# Patient Record
Sex: MALE | Race: WHITE | NOT HISPANIC OR LATINO | Employment: OTHER | URBAN - METROPOLITAN AREA
[De-identification: names, ages, dates, MRNs, and addresses within clinical notes are randomized per-mention and may not be internally consistent; named-entity substitution may affect disease eponyms.]

---

## 2018-03-14 ENCOUNTER — OFFICE VISIT CONVERTED (OUTPATIENT)
Dept: CARDIOLOGY | Facility: CLINIC | Age: 63
End: 2018-03-14
Attending: INTERNAL MEDICINE

## 2018-03-14 ENCOUNTER — CONVERSION ENCOUNTER (OUTPATIENT)
Dept: CARDIOLOGY | Facility: CLINIC | Age: 63
End: 2018-03-14

## 2018-08-22 ENCOUNTER — CONVERSION ENCOUNTER (OUTPATIENT)
Dept: CARDIOLOGY | Facility: CLINIC | Age: 63
End: 2018-08-22

## 2018-08-22 ENCOUNTER — OFFICE VISIT CONVERTED (OUTPATIENT)
Dept: CARDIOLOGY | Facility: CLINIC | Age: 63
End: 2018-08-22
Attending: INTERNAL MEDICINE

## 2018-09-10 ENCOUNTER — OFFICE VISIT CONVERTED (OUTPATIENT)
Dept: GASTROENTEROLOGY | Facility: CLINIC | Age: 63
End: 2018-09-10
Attending: NURSE PRACTITIONER

## 2018-09-14 ENCOUNTER — CONVERSION ENCOUNTER (OUTPATIENT)
Dept: CARDIOLOGY | Facility: CLINIC | Age: 63
End: 2018-09-14
Attending: INTERNAL MEDICINE

## 2018-09-25 ENCOUNTER — OFFICE VISIT CONVERTED (OUTPATIENT)
Dept: FAMILY MEDICINE CLINIC | Age: 63
End: 2018-09-25
Attending: NURSE PRACTITIONER

## 2018-11-16 ENCOUNTER — OFFICE VISIT CONVERTED (OUTPATIENT)
Dept: FAMILY MEDICINE CLINIC | Age: 63
End: 2018-11-16
Attending: NURSE PRACTITIONER

## 2018-12-14 ENCOUNTER — CONVERSION ENCOUNTER (OUTPATIENT)
Dept: ORTHOPEDIC SURGERY | Facility: CLINIC | Age: 63
End: 2018-12-14

## 2018-12-14 ENCOUNTER — OFFICE VISIT CONVERTED (OUTPATIENT)
Dept: ORTHOPEDIC SURGERY | Facility: CLINIC | Age: 63
End: 2018-12-14
Attending: ORTHOPAEDIC SURGERY

## 2018-12-31 ENCOUNTER — OFFICE VISIT CONVERTED (OUTPATIENT)
Dept: ORTHOPEDIC SURGERY | Facility: CLINIC | Age: 63
End: 2018-12-31
Attending: ORTHOPAEDIC SURGERY

## 2019-01-08 ENCOUNTER — HOSPITAL ENCOUNTER (OUTPATIENT)
Dept: PHYSICAL THERAPY | Facility: CLINIC | Age: 64
Setting detail: RECURRING SERIES
Discharge: HOME | End: 2019-02-04
Attending: ORTHOPAEDIC SURGERY

## 2019-01-22 ENCOUNTER — HOSPITAL ENCOUNTER (OUTPATIENT)
Dept: OTHER | Facility: HOSPITAL | Age: 64
Discharge: HOME | End: 2019-01-22
Attending: NURSE PRACTITIONER

## 2019-01-22 ENCOUNTER — OFFICE VISIT CONVERTED (OUTPATIENT)
Dept: FAMILY MEDICINE CLINIC | Age: 64
End: 2019-01-22
Attending: NURSE PRACTITIONER

## 2019-01-22 LAB
ALBUMIN SERPL-MCNC: 4.4 G/DL (ref 3.5–5)
ALBUMIN/GLOB SERPL: 1.4 {RATIO} (ref 1.4–2.6)
ALP SERPL-CCNC: 93 U/L (ref 56–155)
ALT SERPL-CCNC: 13 U/L (ref 10–40)
ANION GAP SERPL CALC-SCNC: 18 MMOL/L (ref 8–19)
AST SERPL-CCNC: 15 U/L (ref 15–50)
BASOPHILS # BLD MANUAL: 0.01 10*3/UL (ref 0–0.2)
BASOPHILS NFR BLD MANUAL: 0.1 % (ref 0–3)
BILIRUB SERPL-MCNC: 0.7 MG/DL (ref 0.2–1.3)
BUN SERPL-MCNC: 13 MG/DL (ref 5–25)
BUN/CREAT SERPL: 12 {RATIO} (ref 6–20)
CALCIUM SERPL-MCNC: 9.2 MG/DL (ref 8.7–10.4)
CHLORIDE SERPL-SCNC: 102 MMOL/L (ref 99–111)
CONV CO2: 24 MMOL/L (ref 22–32)
CONV TOTAL PROTEIN: 7.6 G/DL (ref 6.3–8.2)
CREAT UR-MCNC: 1.11 MG/DL (ref 0.7–1.2)
DEPRECATED RDW RBC AUTO: 42.7 FL
EOSINOPHIL # BLD MANUAL: 0.05 10*3/UL (ref 0–0.7)
EOSINOPHIL NFR BLD MANUAL: 0.7 % (ref 0–7)
ERYTHROCYTE [DISTWIDTH] IN BLOOD BY AUTOMATED COUNT: 12.5 % (ref 11.5–14.5)
FOLATE SERPL-MCNC: 13.1 NG/ML (ref 4.8–20)
GFR SERPLBLD BASED ON 1.73 SQ M-ARVRAT: >60 ML/MIN/{1.73_M2}
GLOBULIN UR ELPH-MCNC: 3.2 G/DL (ref 2–3.5)
GLUCOSE SERPL-MCNC: 87 MG/DL (ref 70–99)
GRANS (ABSOLUTE): 5.4 10*3/UL (ref 2–8)
GRANS: 76.1 % (ref 30–85)
HBA1C MFR BLD: 15.5 G/DL (ref 14–18)
HCT VFR BLD AUTO: 45.6 % (ref 42–52)
IMM GRANULOCYTES # BLD: 0.01 10*3/UL (ref 0–0.54)
IMM GRANULOCYTES NFR BLD: 0.1 % (ref 0–0.43)
LYMPHOCYTES # BLD MANUAL: 1.19 10*3/UL (ref 1–5)
LYMPHOCYTES NFR BLD MANUAL: 6.3 % (ref 3–10)
MCH RBC QN AUTO: 31.5 PG (ref 27–31)
MCHC RBC AUTO-ENTMCNC: 34 G/DL (ref 33–37)
MCV RBC AUTO: 92.7 FL (ref 80–96)
MONOCYTES # BLD AUTO: 0.45 10*3/UL (ref 0.2–1.2)
OSMOLALITY SERPL CALC.SUM OF ELEC: 287 MOSM/KG (ref 273–304)
PLATELET # BLD AUTO: 196 10*3/UL (ref 130–400)
PMV BLD AUTO: 9.7 FL (ref 7.4–10.4)
POTASSIUM SERPL-SCNC: 5.1 MMOL/L (ref 3.5–5.3)
RBC # BLD AUTO: 4.92 10*6/UL (ref 4.7–6.1)
SODIUM SERPL-SCNC: 139 MMOL/L (ref 135–147)
TSH SERPL-ACNC: 2.62 M[IU]/L (ref 0.27–4.2)
VARIANT LYMPHS NFR BLD MANUAL: 16.7 % (ref 20–45)
VIT B12 SERPL-MCNC: 241 PG/ML (ref 211–911)
WBC # BLD AUTO: 7.11 10*3/UL (ref 4.8–10.8)

## 2019-01-24 LAB
ASO AB SERPL-ACNC: 175 [IU]/ML (ref 0–200)
CONV ANTI NUCLEAR ANTIBODY WITH REFLEX: NEGATIVE
CONV RHEUMATOID FACTOR IGM: <10 [IU]/ML (ref 0–14)
CRP SERPL-MCNC: 5 MG/L (ref 0–5)
ERYTHROCYTE [SEDIMENTATION RATE] IN BLOOD: 2 MM/H (ref 0–20)
PHOSPHATE SERPL-MCNC: 2.9 MG/DL (ref 2.4–4.5)
URATE SERPL-MCNC: 5.3 MG/DL (ref 3.5–8.5)

## 2019-01-30 ENCOUNTER — HOSPITAL ENCOUNTER (OUTPATIENT)
Dept: GENERAL RADIOLOGY | Facility: HOSPITAL | Age: 64
Discharge: HOME | End: 2019-01-30
Attending: NURSE PRACTITIONER

## 2019-01-31 ENCOUNTER — OFFICE VISIT CONVERTED (OUTPATIENT)
Dept: ORTHOPEDIC SURGERY | Facility: CLINIC | Age: 64
End: 2019-01-31
Attending: ORTHOPAEDIC SURGERY

## 2019-02-18 ENCOUNTER — HOSPITAL ENCOUNTER (OUTPATIENT)
Dept: PREADMISSION TESTING | Facility: HOSPITAL | Age: 64
Discharge: HOME | End: 2019-02-18
Attending: ORTHOPAEDIC SURGERY

## 2019-02-18 LAB
ANION GAP SERPL CALC-SCNC: 15 MMOL/L (ref 8–19)
APTT BLD: 26.7 S (ref 22.2–34.2)
BASOPHILS # BLD AUTO: 0.01 10*3/UL (ref 0–0.2)
BASOPHILS NFR BLD AUTO: 0.2 % (ref 0–3)
BUN SERPL-MCNC: 18 MG/DL (ref 5–25)
BUN/CREAT SERPL: 16 {RATIO} (ref 6–20)
CALCIUM SERPL-MCNC: 8.5 MG/DL (ref 8.7–10.4)
CHLORIDE SERPL-SCNC: 104 MMOL/L (ref 99–111)
CONV ABS IMM GRAN: 0.01 10*3/UL (ref 0–0.2)
CONV CO2: 23 MMOL/L (ref 22–32)
CONV IMMATURE GRAN: 0.2 % (ref 0–1.8)
CREAT UR-MCNC: 1.15 MG/DL (ref 0.7–1.2)
DEPRECATED RDW RBC AUTO: 45.8 FL (ref 35.1–43.9)
EOSINOPHIL # BLD AUTO: 0.06 10*3/UL (ref 0–0.7)
EOSINOPHIL # BLD AUTO: 1.1 % (ref 0–7)
ERYTHROCYTE [DISTWIDTH] IN BLOOD BY AUTOMATED COUNT: 12.9 % (ref 11.6–14.4)
GFR SERPLBLD BASED ON 1.73 SQ M-ARVRAT: >60 ML/MIN/{1.73_M2}
GLUCOSE SERPL-MCNC: 92 MG/DL (ref 70–99)
HBA1C MFR BLD: 14.2 G/DL (ref 14–18)
HCT VFR BLD AUTO: 42.7 % (ref 42–52)
INR PPP: 1.03 (ref 2–3)
LYMPHOCYTES # BLD AUTO: 1.02 10*3/UL (ref 1–5)
MCH RBC QN AUTO: 32.2 PG (ref 27–31)
MCHC RBC AUTO-ENTMCNC: 33.3 G/DL (ref 33–37)
MCV RBC AUTO: 96.8 FL (ref 80–96)
MONOCYTES # BLD AUTO: 0.43 10*3/UL (ref 0.2–1.2)
MONOCYTES NFR BLD AUTO: 8.1 % (ref 3–10)
NEUTROPHILS # BLD AUTO: 3.79 10*3/UL (ref 2–8)
NEUTROPHILS NFR BLD AUTO: 71.2 % (ref 30–85)
NRBC CBCN: 0 % (ref 0–0.7)
OSMOLALITY SERPL CALC.SUM OF ELEC: 286 MOSM/KG (ref 273–304)
PLATELET # BLD AUTO: 155 10*3/UL (ref 130–400)
PMV BLD AUTO: 9.8 FL (ref 9.4–12.4)
POTASSIUM SERPL-SCNC: 4.6 MMOL/L (ref 3.5–5.3)
PROTHROMBIN TIME: 10.6 S (ref 9.4–12)
RBC # BLD AUTO: 4.41 10*6/UL (ref 4.7–6.1)
SODIUM SERPL-SCNC: 137 MMOL/L (ref 135–147)
VARIANT LYMPHS NFR BLD MANUAL: 19.2 % (ref 20–45)
WBC # BLD AUTO: 5.32 10*3/UL (ref 4.8–10.8)

## 2019-02-27 ENCOUNTER — HOSPITAL ENCOUNTER (OUTPATIENT)
Dept: PERIOP | Facility: HOSPITAL | Age: 64
Setting detail: HOSPITAL OUTPATIENT SURGERY
Discharge: HOME | End: 2019-02-28
Attending: INTERNAL MEDICINE

## 2019-02-27 LAB — GLUCOSE BLD-MCNC: 99 MG/DL (ref 70–99)

## 2019-02-28 LAB
ANION GAP SERPL CALC-SCNC: 14 MMOL/L (ref 8–19)
BUN SERPL-MCNC: 9 MG/DL (ref 5–25)
BUN/CREAT SERPL: 8 {RATIO} (ref 6–20)
CALCIUM SERPL-MCNC: 8.7 MG/DL (ref 8.7–10.4)
CHLORIDE SERPL-SCNC: 105 MMOL/L (ref 99–111)
CONV CO2: 28 MMOL/L (ref 22–32)
CREAT UR-MCNC: 1.07 MG/DL (ref 0.7–1.2)
GFR SERPLBLD BASED ON 1.73 SQ M-ARVRAT: >60 ML/MIN/{1.73_M2}
GLUCOSE SERPL-MCNC: 106 MG/DL (ref 70–99)
HBA1C MFR BLD: 13.4 G/DL (ref 14–18)
HCT VFR BLD AUTO: 40.8 % (ref 42–52)
OSMOLALITY SERPL CALC.SUM OF ELEC: 293 MOSM/KG (ref 273–304)
POTASSIUM SERPL-SCNC: 4.9 MMOL/L (ref 3.5–5.3)
SODIUM SERPL-SCNC: 142 MMOL/L (ref 135–147)

## 2019-03-01 ENCOUNTER — HOSPITAL ENCOUNTER (OUTPATIENT)
Dept: PHYSICAL THERAPY | Facility: CLINIC | Age: 64
Setting detail: RECURRING SERIES
Discharge: HOME | End: 2019-05-07
Attending: ORTHOPAEDIC SURGERY

## 2019-03-05 ENCOUNTER — OFFICE VISIT CONVERTED (OUTPATIENT)
Dept: FAMILY MEDICINE CLINIC | Age: 64
End: 2019-03-05
Attending: NURSE PRACTITIONER

## 2019-03-11 ENCOUNTER — OFFICE VISIT CONVERTED (OUTPATIENT)
Dept: ORTHOPEDIC SURGERY | Facility: CLINIC | Age: 64
End: 2019-03-11
Attending: ORTHOPAEDIC SURGERY

## 2019-04-08 ENCOUNTER — CONVERSION ENCOUNTER (OUTPATIENT)
Dept: ORTHOPEDIC SURGERY | Facility: CLINIC | Age: 64
End: 2019-04-08

## 2019-04-08 ENCOUNTER — OFFICE VISIT CONVERTED (OUTPATIENT)
Dept: ORTHOPEDIC SURGERY | Facility: CLINIC | Age: 64
End: 2019-04-08
Attending: ORTHOPAEDIC SURGERY

## 2019-05-17 ENCOUNTER — HOSPITAL ENCOUNTER (OUTPATIENT)
Dept: LAB | Facility: HOSPITAL | Age: 64
Discharge: HOME | End: 2019-05-17
Attending: INTERNAL MEDICINE

## 2019-05-17 LAB
ALBUMIN SERPL-MCNC: 4.3 G/DL (ref 3.5–5)
ALBUMIN/GLOB SERPL: 1.7 {RATIO} (ref 1.4–2.6)
ALP SERPL-CCNC: 93 U/L (ref 56–155)
ALT SERPL-CCNC: 16 U/L (ref 10–40)
ANION GAP SERPL CALC-SCNC: 17 MMOL/L (ref 8–19)
AST SERPL-CCNC: 18 U/L (ref 15–50)
BILIRUB SERPL-MCNC: 0.64 MG/DL (ref 0.2–1.3)
BNP SERPL-MCNC: 61 PG/ML (ref 0–900)
BUN SERPL-MCNC: 11 MG/DL (ref 5–25)
BUN/CREAT SERPL: 10 {RATIO} (ref 6–20)
CALCIUM SERPL-MCNC: 9.1 MG/DL (ref 8.7–10.4)
CHLORIDE SERPL-SCNC: 104 MMOL/L (ref 99–111)
CHOLEST SERPL-MCNC: 147 MG/DL (ref 107–200)
CHOLEST/HDLC SERPL: 2.4 {RATIO} (ref 3–6)
CONV CO2: 25 MMOL/L (ref 22–32)
CONV TOTAL PROTEIN: 6.8 G/DL (ref 6.3–8.2)
CREAT UR-MCNC: 1.08 MG/DL (ref 0.7–1.2)
GFR SERPLBLD BASED ON 1.73 SQ M-ARVRAT: >60 ML/MIN/{1.73_M2}
GLOBULIN UR ELPH-MCNC: 2.5 G/DL (ref 2–3.5)
GLUCOSE SERPL-MCNC: 109 MG/DL (ref 70–99)
HDLC SERPL-MCNC: 61 MG/DL (ref 40–60)
LDLC SERPL CALC-MCNC: 62 MG/DL (ref 70–100)
OSMOLALITY SERPL CALC.SUM OF ELEC: 292 MOSM/KG (ref 273–304)
POTASSIUM SERPL-SCNC: 4.9 MMOL/L (ref 3.5–5.3)
SODIUM SERPL-SCNC: 141 MMOL/L (ref 135–147)
TRIGL SERPL-MCNC: 120 MG/DL (ref 40–150)
VLDLC SERPL-MCNC: 24 MG/DL (ref 5–37)

## 2019-05-20 ENCOUNTER — OFFICE VISIT CONVERTED (OUTPATIENT)
Dept: CARDIOLOGY | Facility: CLINIC | Age: 64
End: 2019-05-20
Attending: NURSE PRACTITIONER

## 2019-05-21 ENCOUNTER — OFFICE VISIT CONVERTED (OUTPATIENT)
Dept: FAMILY MEDICINE CLINIC | Age: 64
End: 2019-05-21
Attending: NURSE PRACTITIONER

## 2019-07-03 ENCOUNTER — HOSPITAL ENCOUNTER (OUTPATIENT)
Dept: OTHER | Facility: HOSPITAL | Age: 64
Discharge: HOME | End: 2019-07-03
Attending: NURSE PRACTITIONER

## 2019-07-03 LAB
ANION GAP SERPL CALC-SCNC: 17 MMOL/L (ref 8–19)
BUN SERPL-MCNC: 12 MG/DL (ref 5–25)
BUN/CREAT SERPL: 10 {RATIO} (ref 6–20)
CALCIUM SERPL-MCNC: 9.1 MG/DL (ref 8.7–10.4)
CHLORIDE SERPL-SCNC: 103 MMOL/L (ref 99–111)
CONV CO2: 22 MMOL/L (ref 22–32)
CREAT UR-MCNC: 1.21 MG/DL (ref 0.7–1.2)
GFR SERPLBLD BASED ON 1.73 SQ M-ARVRAT: >60 ML/MIN/{1.73_M2}
GLUCOSE SERPL-MCNC: 109 MG/DL (ref 70–99)
OSMOLALITY SERPL CALC.SUM OF ELEC: 286 MOSM/KG (ref 273–304)
POTASSIUM SERPL-SCNC: 4.4 MMOL/L (ref 3.5–5.3)
SODIUM SERPL-SCNC: 138 MMOL/L (ref 135–147)

## 2019-07-10 ENCOUNTER — OFFICE VISIT CONVERTED (OUTPATIENT)
Dept: CARDIOLOGY | Facility: CLINIC | Age: 64
End: 2019-07-10
Attending: INTERNAL MEDICINE

## 2019-07-22 ENCOUNTER — CONVERSION ENCOUNTER (OUTPATIENT)
Dept: ORTHOPEDIC SURGERY | Facility: CLINIC | Age: 64
End: 2019-07-22

## 2019-07-22 ENCOUNTER — OFFICE VISIT CONVERTED (OUTPATIENT)
Dept: ORTHOPEDIC SURGERY | Facility: CLINIC | Age: 64
End: 2019-07-22
Attending: ORTHOPAEDIC SURGERY

## 2019-12-20 ENCOUNTER — OFFICE VISIT CONVERTED (OUTPATIENT)
Dept: FAMILY MEDICINE CLINIC | Age: 64
End: 2019-12-20
Attending: NURSE PRACTITIONER

## 2020-01-08 ENCOUNTER — HOSPITAL ENCOUNTER (OUTPATIENT)
Dept: LAB | Facility: HOSPITAL | Age: 65
Discharge: HOME | End: 2020-01-08
Attending: INTERNAL MEDICINE

## 2020-01-08 LAB
ALBUMIN SERPL-MCNC: 4.3 G/DL (ref 3.5–5)
ALBUMIN/GLOB SERPL: 1.6 {RATIO} (ref 1.4–2.6)
ALP SERPL-CCNC: 92 U/L (ref 56–155)
ALT SERPL-CCNC: 15 U/L (ref 10–40)
ANION GAP SERPL CALC-SCNC: 18 MMOL/L (ref 8–19)
AST SERPL-CCNC: 18 U/L (ref 15–50)
BILIRUB SERPL-MCNC: 0.6 MG/DL (ref 0.2–1.3)
BNP SERPL-MCNC: 37 PG/ML (ref 0–900)
BUN SERPL-MCNC: 13 MG/DL (ref 5–25)
BUN/CREAT SERPL: 10 {RATIO} (ref 6–20)
CALCIUM SERPL-MCNC: 9.3 MG/DL (ref 8.7–10.4)
CHLORIDE SERPL-SCNC: 104 MMOL/L (ref 99–111)
CHOLEST SERPL-MCNC: 155 MG/DL (ref 107–200)
CHOLEST/HDLC SERPL: 3.4 {RATIO} (ref 3–6)
CONV CO2: 24 MMOL/L (ref 22–32)
CONV TOTAL PROTEIN: 7 G/DL (ref 6.3–8.2)
CREAT UR-MCNC: 1.3 MG/DL (ref 0.7–1.2)
GFR SERPLBLD BASED ON 1.73 SQ M-ARVRAT: 58 ML/MIN/{1.73_M2}
GLOBULIN UR ELPH-MCNC: 2.7 G/DL (ref 2–3.5)
GLUCOSE SERPL-MCNC: 106 MG/DL (ref 70–99)
HDLC SERPL-MCNC: 45 MG/DL (ref 40–60)
LDLC SERPL CALC-MCNC: 83 MG/DL (ref 70–100)
OSMOLALITY SERPL CALC.SUM OF ELEC: 293 MOSM/KG (ref 273–304)
POTASSIUM SERPL-SCNC: 4.6 MMOL/L (ref 3.5–5.3)
SODIUM SERPL-SCNC: 141 MMOL/L (ref 135–147)
TRIGL SERPL-MCNC: 134 MG/DL (ref 40–150)
VLDLC SERPL-MCNC: 27 MG/DL (ref 5–37)

## 2020-01-10 ENCOUNTER — OFFICE VISIT CONVERTED (OUTPATIENT)
Dept: ORTHOPEDIC SURGERY | Facility: CLINIC | Age: 65
End: 2020-01-10
Attending: PHYSICIAN ASSISTANT

## 2020-01-15 ENCOUNTER — OFFICE VISIT CONVERTED (OUTPATIENT)
Dept: CARDIOLOGY | Facility: CLINIC | Age: 65
End: 2020-01-15
Attending: NURSE PRACTITIONER

## 2020-03-12 ENCOUNTER — HOSPITAL ENCOUNTER (OUTPATIENT)
Dept: OTHER | Facility: HOSPITAL | Age: 65
Discharge: HOME | End: 2020-03-12
Attending: NURSE PRACTITIONER

## 2020-03-12 LAB
ALBUMIN SERPL-MCNC: 4.4 G/DL (ref 3.5–5)
ALBUMIN/GLOB SERPL: 1.6 {RATIO} (ref 1.4–2.6)
ALP SERPL-CCNC: 89 U/L (ref 56–155)
ALT SERPL-CCNC: 19 U/L (ref 10–40)
ANION GAP SERPL CALC-SCNC: 19 MMOL/L (ref 8–19)
AST SERPL-CCNC: 20 U/L (ref 15–50)
BASOPHILS # BLD MANUAL: 0.02 10*3/UL (ref 0–0.2)
BASOPHILS NFR BLD MANUAL: 0.4 % (ref 0–3)
BILIRUB SERPL-MCNC: 0.73 MG/DL (ref 0.2–1.3)
BUN SERPL-MCNC: 13 MG/DL (ref 5–25)
BUN/CREAT SERPL: 11 {RATIO} (ref 6–20)
CALCIUM SERPL-MCNC: 9.3 MG/DL (ref 8.7–10.4)
CHLORIDE SERPL-SCNC: 104 MMOL/L (ref 99–111)
CHOLEST SERPL-MCNC: 134 MG/DL (ref 107–200)
CHOLEST/HDLC SERPL: 3 {RATIO} (ref 3–6)
CONV CO2: 21 MMOL/L (ref 22–32)
CONV TOTAL PROTEIN: 7.1 G/DL (ref 6.3–8.2)
CREAT UR-MCNC: 1.18 MG/DL (ref 0.7–1.2)
DEPRECATED RDW RBC AUTO: 44.1 FL
EOSINOPHIL # BLD MANUAL: 0.06 10*3/UL (ref 0–0.7)
EOSINOPHIL NFR BLD MANUAL: 1.2 % (ref 0–7)
ERYTHROCYTE [DISTWIDTH] IN BLOOD BY AUTOMATED COUNT: 12.9 % (ref 11.5–14.5)
GFR SERPLBLD BASED ON 1.73 SQ M-ARVRAT: >60 ML/MIN/{1.73_M2}
GLOBULIN UR ELPH-MCNC: 2.7 G/DL (ref 2–3.5)
GLUCOSE SERPL-MCNC: 96 MG/DL (ref 70–99)
GRANS (ABSOLUTE): 3.7 10*3/UL (ref 2–8)
GRANS: 72.4 % (ref 30–85)
HBA1C MFR BLD: 14.7 G/DL (ref 14–18)
HCT VFR BLD AUTO: 43.3 % (ref 42–52)
HDLC SERPL-MCNC: 45 MG/DL (ref 40–60)
IMM GRANULOCYTES # BLD: 0.01 10*3/UL (ref 0–0.54)
IMM GRANULOCYTES NFR BLD: 0.2 % (ref 0–0.43)
LDLC SERPL CALC-MCNC: 64 MG/DL (ref 70–100)
LYMPHOCYTES # BLD MANUAL: 0.89 10*3/UL (ref 1–5)
LYMPHOCYTES NFR BLD MANUAL: 8.4 % (ref 3–10)
MCH RBC QN AUTO: 31.1 PG (ref 27–31)
MCHC RBC AUTO-ENTMCNC: 33.9 G/DL (ref 33–37)
MCV RBC AUTO: 91.5 FL (ref 80–96)
MONOCYTES # BLD AUTO: 0.43 10*3/UL (ref 0.2–1.2)
OSMOLALITY SERPL CALC.SUM OF ELEC: 290 MOSM/KG (ref 273–304)
PLATELET # BLD AUTO: 151 10*3/UL (ref 130–400)
PMV BLD AUTO: 10.2 FL (ref 7.4–10.4)
POTASSIUM SERPL-SCNC: 4.2 MMOL/L (ref 3.5–5.3)
PSA SERPL-MCNC: 0.89 NG/ML (ref 0–4)
RBC # BLD AUTO: 4.73 10*6/UL (ref 4.7–6.1)
SODIUM SERPL-SCNC: 140 MMOL/L (ref 135–147)
TRIGL SERPL-MCNC: 123 MG/DL (ref 40–150)
TSH SERPL-ACNC: 2.75 M[IU]/L (ref 0.27–4.2)
VARIANT LYMPHS NFR BLD MANUAL: 17.4 % (ref 20–45)
VLDLC SERPL-MCNC: 25 MG/DL (ref 5–37)
WBC # BLD AUTO: 5.11 10*3/UL (ref 4.8–10.8)

## 2020-03-13 LAB
CONV HEPATITIS C AB WITH REFLEX TO CONFIRMATION: <0.1 S/CO RATIO (ref 0–0.9)
CONV HEPATITIS COMMENT: NORMAL

## 2020-08-13 ENCOUNTER — HOSPITAL ENCOUNTER (OUTPATIENT)
Dept: LAB | Facility: HOSPITAL | Age: 65
Discharge: HOME | End: 2020-08-13
Attending: NURSE PRACTITIONER

## 2020-08-13 LAB
ALBUMIN SERPL-MCNC: 4.4 G/DL (ref 3.5–5)
ALBUMIN/GLOB SERPL: 1.9 {RATIO} (ref 1.4–2.6)
ALP SERPL-CCNC: 95 U/L (ref 56–155)
ALT SERPL-CCNC: 15 U/L (ref 10–40)
ANION GAP SERPL CALC-SCNC: 25 MMOL/L (ref 8–19)
AST SERPL-CCNC: 21 U/L (ref 15–50)
BASOPHILS # BLD AUTO: 0.01 10*3/UL (ref 0–0.2)
BASOPHILS NFR BLD AUTO: 0.2 % (ref 0–3)
BILIRUB SERPL-MCNC: 0.79 MG/DL (ref 0.2–1.3)
BUN SERPL-MCNC: 13 MG/DL (ref 5–25)
BUN/CREAT SERPL: 10 {RATIO} (ref 6–20)
CALCIUM SERPL-MCNC: 10.1 MG/DL (ref 8.7–10.4)
CHLORIDE SERPL-SCNC: 106 MMOL/L (ref 99–111)
CHOLEST SERPL-MCNC: 141 MG/DL (ref 107–200)
CHOLEST/HDLC SERPL: 2.7 {RATIO} (ref 3–6)
CONV ABS IMM GRAN: 0.01 10*3/UL (ref 0–0.2)
CONV CO2: 19 MMOL/L (ref 22–32)
CONV IMMATURE GRAN: 0.2 % (ref 0–1.8)
CONV TOTAL PROTEIN: 6.7 G/DL (ref 6.3–8.2)
CREAT UR-MCNC: 1.33 MG/DL (ref 0.7–1.2)
DEPRECATED RDW RBC AUTO: 46.4 FL (ref 35.1–43.9)
EOSINOPHIL # BLD AUTO: 0.09 10*3/UL (ref 0–0.7)
EOSINOPHIL # BLD AUTO: 1.9 % (ref 0–7)
ERYTHROCYTE [DISTWIDTH] IN BLOOD BY AUTOMATED COUNT: 13 % (ref 11.6–14.4)
GFR SERPLBLD BASED ON 1.73 SQ M-ARVRAT: 56 ML/MIN/{1.73_M2}
GLOBULIN UR ELPH-MCNC: 2.3 G/DL (ref 2–3.5)
GLUCOSE SERPL-MCNC: 96 MG/DL (ref 70–99)
HCT VFR BLD AUTO: 43.5 % (ref 42–52)
HDLC SERPL-MCNC: 52 MG/DL (ref 40–60)
HGB BLD-MCNC: 14.1 G/DL (ref 14–18)
LDLC SERPL CALC-MCNC: 73 MG/DL (ref 70–100)
LYMPHOCYTES # BLD AUTO: 1.1 10*3/UL (ref 1–5)
LYMPHOCYTES NFR BLD AUTO: 23.7 % (ref 20–45)
MCH RBC QN AUTO: 31.2 PG (ref 27–31)
MCHC RBC AUTO-ENTMCNC: 32.4 G/DL (ref 33–37)
MCV RBC AUTO: 96.2 FL (ref 80–96)
MONOCYTES # BLD AUTO: 0.33 10*3/UL (ref 0.2–1.2)
MONOCYTES NFR BLD AUTO: 7.1 % (ref 3–10)
NEUTROPHILS # BLD AUTO: 3.1 10*3/UL (ref 2–8)
NEUTROPHILS NFR BLD AUTO: 66.9 % (ref 30–85)
NRBC CBCN: 0 % (ref 0–0.7)
OSMOLALITY SERPL CALC.SUM OF ELEC: 300 MOSM/KG (ref 273–304)
PLATELET # BLD AUTO: 157 10*3/UL (ref 130–400)
PMV BLD AUTO: 11.1 FL (ref 9.4–12.4)
POTASSIUM SERPL-SCNC: 4.7 MMOL/L (ref 3.5–5.3)
RBC # BLD AUTO: 4.52 10*6/UL (ref 4.7–6.1)
SODIUM SERPL-SCNC: 145 MMOL/L (ref 135–147)
TRIGL SERPL-MCNC: 81 MG/DL (ref 40–150)
VLDLC SERPL-MCNC: 16 MG/DL (ref 5–37)
WBC # BLD AUTO: 4.64 10*3/UL (ref 4.8–10.8)

## 2020-08-19 ENCOUNTER — OFFICE VISIT CONVERTED (OUTPATIENT)
Dept: CARDIOLOGY | Facility: CLINIC | Age: 65
End: 2020-08-19
Attending: INTERNAL MEDICINE

## 2020-09-15 ENCOUNTER — OFFICE VISIT CONVERTED (OUTPATIENT)
Dept: FAMILY MEDICINE CLINIC | Age: 65
End: 2020-09-15
Attending: NURSE PRACTITIONER

## 2021-03-23 ENCOUNTER — HOSPITAL ENCOUNTER (OUTPATIENT)
Dept: LAB | Facility: HOSPITAL | Age: 66
Discharge: HOME | End: 2021-03-23
Attending: INTERNAL MEDICINE

## 2021-03-23 LAB
ALBUMIN SERPL-MCNC: 4.1 G/DL (ref 3.5–5)
ALBUMIN/GLOB SERPL: 1.8 {RATIO} (ref 1.4–2.6)
ALP SERPL-CCNC: 80 U/L (ref 56–155)
ALT SERPL-CCNC: 15 U/L (ref 10–40)
ANION GAP SERPL CALC-SCNC: 13 MMOL/L (ref 8–19)
AST SERPL-CCNC: 19 U/L (ref 15–50)
BILIRUB SERPL-MCNC: 0.79 MG/DL (ref 0.2–1.3)
BNP SERPL-MCNC: 47 PG/ML (ref 0–900)
BUN SERPL-MCNC: 12 MG/DL (ref 5–25)
BUN/CREAT SERPL: 11 {RATIO} (ref 6–20)
CALCIUM SERPL-MCNC: 9.1 MG/DL (ref 8.7–10.4)
CHLORIDE SERPL-SCNC: 107 MMOL/L (ref 99–111)
CHOLEST SERPL-MCNC: 130 MG/DL (ref 107–200)
CHOLEST/HDLC SERPL: 2.1 {RATIO} (ref 3–6)
CONV CO2: 27 MMOL/L (ref 22–32)
CONV TOTAL PROTEIN: 6.4 G/DL (ref 6.3–8.2)
CREAT UR-MCNC: 1.07 MG/DL (ref 0.7–1.2)
GFR SERPLBLD BASED ON 1.73 SQ M-ARVRAT: >60 ML/MIN/{1.73_M2}
GLOBULIN UR ELPH-MCNC: 2.3 G/DL (ref 2–3.5)
GLUCOSE SERPL-MCNC: 105 MG/DL (ref 70–99)
HDLC SERPL-MCNC: 63 MG/DL (ref 40–60)
LDLC SERPL CALC-MCNC: 49 MG/DL (ref 70–100)
OSMOLALITY SERPL CALC.SUM OF ELEC: 296 MOSM/KG (ref 273–304)
POTASSIUM SERPL-SCNC: 4.2 MMOL/L (ref 3.5–5.3)
SODIUM SERPL-SCNC: 143 MMOL/L (ref 135–147)
TRIGL SERPL-MCNC: 89 MG/DL (ref 40–150)
VLDLC SERPL-MCNC: 18 MG/DL (ref 5–37)

## 2021-03-31 ENCOUNTER — OFFICE VISIT CONVERTED (OUTPATIENT)
Dept: CARDIOLOGY | Facility: CLINIC | Age: 66
End: 2021-03-31
Attending: NURSE PRACTITIONER

## 2021-04-21 ENCOUNTER — OFFICE VISIT CONVERTED (OUTPATIENT)
Dept: FAMILY MEDICINE CLINIC | Age: 66
End: 2021-04-21
Attending: NURSE PRACTITIONER

## 2021-05-13 NOTE — PROGRESS NOTES
Progress Note      Patient Name: Timothy Spurling   Patient ID: [id number]   Sex: Male   YOB: 1955    Primary Care Provider: Eda MCKEON   Referring Provider: Viv MCKEON    Visit Date: August 19, 2020    Provider: Ruba Medrano MD   Location: [facility]   Location Address: [address] [place] [address], [facility]   [place]  [id number]   Location Phone: [phone]          Chief Complaint  · Follow-up of congestive heart failure       History Of Present Illness  REFERRING PROVIDER: Eda MCKEON   Timothy R. Spurling is a 64-year-old gentleman with chronic combined congestive heart failure with recovery of ejection fraction, who is doing very well. He has lost 70 pounds in seven months. He occasionally feels dizzy, and his blood pressure runs on the low side. He denies chest pain, palpitations or syncope. His shortness of breath on exertion is present but stable.   PAST MEDICAL HISTORY: Chronic combined heart failure; Deep vein thrombosis; Hypertension; Obesity; Symptomatic PVCs.   FAMILY HISTORY: Positive for hypertension. Negative for diabetes and heart disease.   PSYCHOSOCIAL HISTORY: No history of mood changes or depression. He rarely drinks alcohol and never used tobacco.   CURRENT MEDICATIONS: include Losartan 25 mg 1/2 tablet b.i.d.; Spironolactone 25 mg 1/2 tablet daily; Furosemide 20 mg every other day; Amitriptyline 75 mg daily; Xarelto 10 mg daily; Loratadine 10 mg daily; Hydrocodone b.i.d. p.r.n.; Cyclobenzaprine 10 mg p.r.n. The dosage and frequency of the medications were reviewed with the patient.       Review of Systems  · Cardiovascular  o Admits  o : palpitations (fast, fluttering, or skipping beats), swelling (feet, ankles, hands), shortness of breath while walking or lying flat  o Denies  o : chest pain or angina pectoris   · Respiratory  o Denies  o : chronic or frequent cough, asthma or wheezing      Vitals  Date Time BP Position  "Site L\R Cuff Size HR RR TEMP (F) WT  HT  BMI kg/m2 BSA m2 O2 Sat        08/19/2020 09:21 /64 Sitting    66 - R   241lbs 16oz 6'  4\" 29.46 2.43           Physical Examination  · Constitutional  o Appearance  o : Awake, alert, in no acute distress.  · Eyes  o Conjunctivae  o : Conjunctivae normal.  · Ears, Nose, Mouth and Throat  o Oral Cavity  o :   § Oral Mucosa  § : Normal.  · Neck  o Inspection/Palpation/Auscultation  o : No lymphadenopathy. No JVD. No bruit. Good carotid upstroke.  · Respiratory  o Respiratory  o : Clear to percussion and auscultation. Good respiratory effort.  · Cardiovascular  o Heart  o : PMI is not well felt. S1, S2 are normal. No S3. No S4.  o Peripheral Vascular System  o :   § Extremities  § : Good femoral and pedal pulses. No pedal edema.  · Gastrointestinal  o Abdominal Examination  o : Soft. No masses or tenderness felt. No hepatosplenomegaly. Abdominal aorta is not palpable.     Blood pressure log looks good.    EKG was performed in the office today.  Indication:        PVCs, congestive heart failure.  Results:           sinus rhythm, normal axis, left bundle branch block.  Comparison:    No change compared to previous EKG.    CBC is normal.  Chemistry panel is normal.  HDL 52, LDL 73, TRG 81.  GFR is slightly low at 53.           Assessment     1.  Chronic combined congestive heart failure, with recovery of ejection fraction, Class II, stable.  2.  Hypertension controlled, maybe too good of control.  3.  Morbid obesity, dramatic improvement in weight with significant weight loss.         Plan     1.  Discontinue the Furosemide.  If his swelling comes back or if his shortness of breath gets worse, he will call       us, and we will restart he Furosemide and decrease his Losartan.  2.  Follow up in 6 months.    Ruba Medrano M.D., [facility]  jaqueline/kevyn           This note was transcribed by Mitali Escobedo.  kevyn/jaqueline  The above service was transcribed by Mitali Escobedo, and I attest to the " accuracy of the note.  PMM               Electronically Signed by: Mitali Escobedo-, -Author on August 21, 2020 03:53:28 AM  Electronically Co-signed by: Ruba Medrano MD -Reviewer on August 22, 2020 03:28:41 PM

## 2021-05-14 VITALS
BODY MASS INDEX: 26.79 KG/M2 | SYSTOLIC BLOOD PRESSURE: 116 MMHG | HEIGHT: 76 IN | DIASTOLIC BLOOD PRESSURE: 62 MMHG | WEIGHT: 220 LBS | HEART RATE: 72 BPM

## 2021-05-14 NOTE — PROGRESS NOTES
Progress Note      Patient Name: Timothy Spurling   Patient ID: [id number]   Sex: Male   YOB: 1955    Primary Care Provider: Eda MCKEON   Referring Provider: Viv MCKEON    Visit Date: March 31, 2021    Provider: LINDA Gaspar   Location: [facility]   Location Address: [address] [place] [address], [address]   [place]  [id number]   Location Phone: [phone]          Chief Complaint     Dizziness.       History Of Present Illness  REFERRING PROVIDER: Viv MCKEON   Timothy R. Spurling is a 65 year old /White male who is continuing to lose weight. He is down another 22 pounds since his last visit, and with that, he has home blood pressures that range anywhere from 82/48 to 120/73, but most are low. He is starting to complain of increasing dizziness, and with that, he has palpitations, described as his heart racing and he is a little short-winded. He denies any chest pain. No syncope, PND, or orthopnea.   PAST MEDICAL HISTORY: Chronic combined heart failure; Deep vein thrombosis; Hypertension; Obesity; Symptomatic PVCs.   PSYCHOSOCIAL HISTORY: Rarely consumes alcohol.   CURRENT MEDICATIONS: Amitriptyline 75 mg daily; losartan 25 mg 1/2 tablet b.i.d.; Eliquis 2.5 mg b.i.d.; spironolactone 25 mg 1/2 tablet daily; loratadine 10 mg daily; cyclobenzaprine 10 mg p.r.n.; hydrocodone-acetaminophen 7.5-325 mg q. 12 h. p.r.n.; Florajen probiotic daily; tamsulosin 0.4 mg daily.      ALLERGIES: No known drug allergies.       Review of Systems  · Cardiovascular  o Admits  o : palpitations (fast, fluttering, or skipping beats), shortness of breath while walking or lying flat  o Denies  o : swelling (feet, ankles, hands), chest pain or angina pectoris   · Respiratory  o Denies  o : chronic or frequent cough      Vitals  Date Time BP Position Site L\R Cuff Size HR RR TEMP (F) WT  HT  BMI kg/m2 BSA m2 O2 Sat FR L/min FiO2 HC       03/31/2021 08:27 /62 Sitting  "   72 - R   220lbs 0oz 6'  4\" 26.78 2.31             Physical Examination  · Constitutional  o Appearance  o : Awake, alert, in no acute distress.  · Eyes  o Conjunctivae  o : Conjunctivae normal.  · Ears, Nose, Mouth and Throat  o Oral Cavity  o :   § Oral Mucosa  § : Normal.  · Neck  o Jugular Veins  o : No JVD. Good carotid upstroke. No bruits noted.  · Respiratory  o Respiratory  o : Good respiratory effort. Clear to percussion and auscultation.  · Cardiovascular  o Heart  o : PMI not well felt. S1, S2 normal. No S3. No S4.   o Peripheral Vascular System  o :   § Extremities  § : Good femoral and pedal pulses. No pedal edema.  · Gastrointestinal  o Abdominal Examination  o : Soft. No tenderness or masses felt. No hepatosplenomegaly. Abdominal aorta is not palpable.  · Labs  o Labs  o : Blood sugar 105. Total cholesterol 130, triglycerides 89, HDL 63, LDL 49 without meds. BNP is 47.          Assessment     1.  Dizziness related to hypotensive episodes.  2.  Chronic combined heart failure with recovery of ejection fraction of 60% on echo July 2019.  3.  Obesity, improving with weight loss.  4.  Lipids at goal with diet and weight loss.  5.  History of DVT, on Eliquis.       Plan     1.  Conitnue Eliquis in view of his DVT.  2.  Stop spironolactone for 2 days and then take a half every other day.  3.  Decrease losartan to 25 mg half-a-tablet just at nighttime.  4.  He will do a blood pressure log and send it in to us, and when he does, he will tell us how he feels, and we        will adjust medications further if needed.    5.  Otherwise, will follow up in 8-9 months with BMP and a CBC or earlier if needed.      LINDA Frias  JF:vm             Electronically Signed by: Elizabeth Madrid-, Other -Author on April 9, 2021 10:09:10 PM  Electronically Co-signed by: LINDA Gaspar -Reviewer on April 12, 2021 03:24:28 PM  "

## 2021-05-15 VITALS
HEIGHT: 76 IN | SYSTOLIC BLOOD PRESSURE: 112 MMHG | HEART RATE: 64 BPM | WEIGHT: 313 LBS | DIASTOLIC BLOOD PRESSURE: 76 MMHG | BODY MASS INDEX: 38.11 KG/M2

## 2021-05-15 VITALS
HEART RATE: 64 BPM | BODY MASS INDEX: 38.36 KG/M2 | DIASTOLIC BLOOD PRESSURE: 80 MMHG | HEIGHT: 76 IN | SYSTOLIC BLOOD PRESSURE: 120 MMHG | WEIGHT: 315 LBS

## 2021-05-15 VITALS — HEART RATE: 54 BPM | HEIGHT: 76 IN | BODY MASS INDEX: 38.36 KG/M2 | WEIGHT: 315 LBS | OXYGEN SATURATION: 98 %

## 2021-05-15 VITALS — BODY MASS INDEX: 37.99 KG/M2 | HEART RATE: 76 BPM | WEIGHT: 312 LBS | OXYGEN SATURATION: 98 % | HEIGHT: 76 IN

## 2021-05-15 VITALS
WEIGHT: 242 LBS | HEART RATE: 66 BPM | DIASTOLIC BLOOD PRESSURE: 64 MMHG | HEIGHT: 76 IN | SYSTOLIC BLOOD PRESSURE: 118 MMHG | BODY MASS INDEX: 29.47 KG/M2

## 2021-05-15 VITALS — HEIGHT: 76 IN | WEIGHT: 315 LBS | HEART RATE: 92 BPM | BODY MASS INDEX: 38.36 KG/M2 | OXYGEN SATURATION: 96 %

## 2021-05-15 VITALS
BODY MASS INDEX: 37.99 KG/M2 | DIASTOLIC BLOOD PRESSURE: 74 MMHG | HEART RATE: 64 BPM | SYSTOLIC BLOOD PRESSURE: 122 MMHG | WEIGHT: 312 LBS | HEIGHT: 76 IN

## 2021-05-16 VITALS
HEART RATE: 62 BPM | DIASTOLIC BLOOD PRESSURE: 70 MMHG | BODY MASS INDEX: 37.26 KG/M2 | HEIGHT: 76 IN | WEIGHT: 306 LBS | SYSTOLIC BLOOD PRESSURE: 112 MMHG

## 2021-05-16 VITALS — HEART RATE: 84 BPM | HEIGHT: 76 IN | OXYGEN SATURATION: 98 %

## 2021-05-16 VITALS — HEART RATE: 87 BPM | BODY MASS INDEX: 38.11 KG/M2 | WEIGHT: 313 LBS | OXYGEN SATURATION: 97 % | HEIGHT: 76 IN

## 2021-05-16 VITALS — HEIGHT: 76 IN

## 2021-05-16 VITALS
DIASTOLIC BLOOD PRESSURE: 77 MMHG | SYSTOLIC BLOOD PRESSURE: 141 MMHG | HEART RATE: 57 BPM | BODY MASS INDEX: 37.14 KG/M2 | TEMPERATURE: 97.6 F | HEIGHT: 76 IN | WEIGHT: 305 LBS

## 2021-05-16 VITALS
SYSTOLIC BLOOD PRESSURE: 132 MMHG | DIASTOLIC BLOOD PRESSURE: 86 MMHG | HEART RATE: 62 BPM | HEIGHT: 76 IN | BODY MASS INDEX: 35.92 KG/M2 | WEIGHT: 295 LBS

## 2021-05-16 VITALS — HEART RATE: 91 BPM | BODY MASS INDEX: 38.04 KG/M2 | HEIGHT: 76 IN | WEIGHT: 312.37 LBS | OXYGEN SATURATION: 95 %

## 2021-05-18 NOTE — PROGRESS NOTES
Spurling, Timothy R 1955     Preventive Medicine Services    Visit Date: Fri, Dec 20, 2019 10:21 am    Provider: Eda Gu N.P. (Assistant: Stacey Carson MA )    Location:         Electronically signed by Eda Gu N.P. on  12/28/2019 10:42:07 AM                             Subjective:        CC: Cyril is a 64 year old White male.  This is a follow-up visit.  check up, wants ears cleaned out         HPI:           Complaint of other specified hearing loss, bilateral..  Cyril is here needing his ears cleaned out again - he has been having problems hearing again           Cyril is here for routine periodic health screening and examination.  He cannot recall when he last had a physical exam.  His last ECG was <1 year ago.   He has never had a hearing test.      Physical Activity: ** Never exercises; He is current with his influenza.  He is not current with pneumococcal immunization.      ROS:     CONSTITUTIONAL:  Negative for chills, fatigue and fever.      EYES:  Positive for use of glasses.      E/N/T:  Positive for diminished hearing.   Negative for nasal congestion, sinus pressure or sore throat.      CARDIOVASCULAR:  Positive for pedal edema ( mild ).   Negative for chest pain, claudication or orthopnea.      RESPIRATORY:  Negative for dyspnea.      GASTROINTESTINAL:  Positive for abdominal pain ( occasional  chronic ).      MUSCULOSKELETAL:  Positive for arthralgias and back pain ( chronic ).      NEUROLOGICAL:  Negative for dizziness and weakness.      HEMATOLOGIC/LYMPHATIC:  Negative for easy bruising.      ENDOCRINE:  Positive for periods of hypoglycemia.      PSYCHIATRIC:  Negative for anxiety and depression.          Past Medical History / Family History / Social History:         Last Reviewed on 12/28/2019 10:36 AM by Eda Gu    Past Medical History:             PAST MEDICAL HISTORY         Positive for    Hypertension and    Dr. Medrano - heart cath x 2;     Positive for     "Pancreatitis: collapsed duct r/t choley; and    cdiff;         DVT - (after fractured leg)     Positive for    Allergies and    left ear with a lesion removed - precancerous - around  - Dr. Osorio;         CURRENT MEDICAL PROVIDERS:    Cardiologist: Dr. Medrano - every 6 months   (2019 60% EF)    Gastroenterologist: Alonso BETANCOURT    [facility] pain specialist         PREVENTIVE HEALTH MAINTENANCE             COLORECTAL CANCER SCREENING: Up to date (colonoscopy q10y; sigmoidoscopy q5y; Cologuard q3y) was last done , Results are in chart; colonoscopy with normal results     DENTAL CLEANING: has full dentures     EYE EXAM: was last done      Hepatitis C Medicare Screening: no         Surgical History:         Positive for    Cholecystectomy;     Positive for    left lower arm  fracture with plates (removed after); and    stomch removal ()  related to PUD  as a teenager;;         Family History:     Father: Cause of death was Lung Cancer (smoker);  Lung Cancer;  DVT     Mother: COPD;  Osteoarthritis     Brother(s): 1 ;  Lung Cancer     Sister(s): Neurological/Genetic Migraines; Endocrine Graves         Social History:     Occupation: [employer]    . Retired. Disabled (due to back)     Marital Status:      Children: 1 child         Tobacco/Alcohol/Supplements:     Last Reviewed on 2019 11:07 AM by Eda Gu    Tobacco: He has never smoked.          Alcohol: Frequency:    rarely;         Substance Abuse History:     Last Reviewed on 2019 11:07 AM by Eda Gu    None         Mental Health History:     Last Reviewed on 2019 11:07 AM by Eda Gu        \"down \"         Communicable Diseases (eg STDs):     Last Reviewed on 2019 11:07 AM by Eda Gu    Reportable health conditions; NEGATIVE         Current Problems:     Last Reviewed on 2019 11:07 AM by Eda Gu    cardiomyopathy (EF 40- 45% )    " Hypoglycemia, unspecified    Essential (primary) hypertension    Neuralgia and neuritis, unspecified    Encounter for screening for other disorder    Low back pain    Other fatigue    Pain in unspecified joint    Radiculopathy, lumbar region    Presence of unspecified artificial knee joint    Encounter for general adult medical examination without abnormal findings    Impacted cerumen, bilateral    Other specified hearing loss, bilateral    Unspecified atrial fibrillation        Immunizations:     Havrix -adult dose (HepA) 9/25/2018    Havrix -adult dose (HepA) 4/12/2019    Fluzone Quadrivalent (3+ years) 11/1/2018    Fluzone Quadrivalent (3+ years) 10/11/2019    PNEUMOVAX 23 (Pneumococcal PPV23) 9/25/2018        Allergies:     Last Reviewed on 12/20/2019 11:07 AM by Eda Gu    No Known Allergies.        Current Medications:     Last Reviewed on 12/20/2019 11:07 AM by Eda Gu    Losartan 25mg Tablet [Take 1 tablet(s) by mouth daily]    Spironolactone 25mg Tablet [every day except Sunday]    Loratadine 10mg Tablet [1 tab daily]    Florajen 460 mg one tab daily    Metoprolol 25mg Tablet [1/2 tab daily]    Xarelto 10mg Tablet [1 tab daily]    amitriptyline 75mg Tablet [1 time daily]    Hydrocodone/Acetaminophen 7.5mg/325mg Tablet [1 BID PRN]    Miralax  Powder for Oral Solution [1 capful (17g) daily, mixed in 4-8oz of liquid daily]    Lasix 20mg Tablets [1 tab every other day]        Objective:        Vitals:         Historical:     5/21/2019  Wt:   314lbs    Current: 12/20/2019 10:30:34 AM    Ht:  6 ft, 4 in;  Wt: 317 lbs;  BMI: 38.6T: 98 F (oral);  BP: 124/68 mm Hg (left arm, sitting);  P: 67 bpm (left arm (BP Cuff), sitting);  sCr: 1.21 mg/dL;  GFR: 81.87        Exams:     PHYSICAL EXAM:     GENERAL: Vitals recorded well developed, well nourished, obese;  well groomed;  no apparent distress;     EYES: lids and lacrimal system are normal in appearance; extraocular movements intact; conjunctiva and  cornea are normal; PERRLA;     E/N/T: EARS: external auditory canal occluded by cerumen bilaterally;  TMa fter irrigation WNL; OROPHARYNX:  normal mucosa, dentition, gingiva, and posterior pharynx;     NECK:  supple, full ROM; no thyromegaly; no carotid bruits;     RESPIRATORY: normal respiratory rate and pattern with no distress; normal breath sounds with no rales, rhonchi, wheezes or rubs;     CARDIOVASCULAR: normal rate; rhythm is regular;  trace pedal edema;     GASTROINTESTINAL: nontender, nondistended; no hepatosplenomegaly or masses; no bruits;     MUSCULOSKELETAL: gait: affected by a limp and slowed;  pain with range of motion in: the back;     NEUROLOGICAL:  cranial nerves, motor and sensory function, reflexes, gait and coordination are all intact;     PSYCHIATRIC:  appropriate affect and demeanor; normal speech pattern; grossly normal memory;         Assessment:         H61.23   Impacted cerumen, bilateral       H91.8X3   Other specified hearing loss, bilateral       Z00.00   Encounter for general adult medical examination without abnormal findings           ORDERS:         Radiology/Test Orders:       [id number]  Colorectal CA screen results documented and reviewed (PV)  (In-House)              Procedures Ordered:       [id number]  Removal of impacted cerumen right ear (NURSE)  (In-House)              Other Orders:       [id number]  Removal of impacted cerumen left ear (NURSE)  (In-House)              Depression screen negative  (In-House)            1101F  Pt screen for fall risk; document no falls in past year or only 1 fall w/o injury in past year (ROSIE)  (In-House)                      Plan:         Impacted cerumen, bilateral          Orders:       [id number]  Removal of impacted cerumen left ear (NURSE)  (In-House)            [id number]  Removal of impacted cerumen right ear (NURSE)  (In-House)              Encounter for general adult medical examination without abnormal findings    MIPS PHQ-9 Depression  Screening: Completed form scanned and in chart; Total Score 7; Positive Depression Screen but after further evaluation the patient does not have a diagnosis of depression.            Orders:         Depression screen negative  (In-House)            1101F  Pt screen for fall risk; document no falls in past year or only 1 fall w/o injury in past year (ROSIE)  (In-House)            3017F  Colorectal CA screen results documented and reviewed (PV)  (In-House)                  Charge Capture:         Primary Diagnosis:     H61.23  Impacted cerumen, bilateral           Orders:      [id number]  Preventive medicine, established patient, age 40-64 years  (In-House)            [id number]  Removal of impacted cerumen left ear (NURSE)  (In-House)            [id number]  Removal of impacted cerumen right ear (NURSE)  (In-House)              H91.8X3  Other specified hearing loss, bilateral     Z00.00  Encounter for general adult medical examination without abnormal findings           Orders:        Depression screen negative  (In-House)            1101F  Pt screen for fall risk; document no falls in past year or only 1 fall w/o injury in past year (ROSIE)  (In-House)            3017F  Colorectal CA screen results documented and reviewed (PV)  (In-House)

## 2021-05-18 NOTE — PROGRESS NOTES
Spurling, Timothy R. 1955     Office/Outpatient Visit    Visit Date: Tue, Sep 25, 2018 03:53 pm    Provider: Eda Gu N.P. (Assistant: Janna Weaver)    Location: [facility]        Electronically signed by Eda Gu N.P. on  09/26/2018 08:43:34 AM                             SUBJECTIVE:        CC:     Mr. Spurling is a 62 year old White male.  This is his first visit to the clinic.  The patient is accompanied into the exam room by his spouse.  establish care         HPI:         Patient presents with screening for depression.          PHQ-9 Depression Screening: Completed form scanned and in chart; Total Score 7 Alcohol Consumption Screening: Completed form scanned and in chart; Total Score 4         Complaint of neuralgia..  The symptom began 'quite a few ' years ago.  The severity is of moderate intensity.  Symptoms are relieved with medication.  Bilateral lower extremities - uncertain as to the cause other than possible lower back - is managed by [facility] Takes Amitriptyline for this neuralgia         In regard to the hypertension, he did not bring his blood pressure diary, but says that pressures have been okay.  He is tolerating the medication well without side effects.  Compliance with treatment has been good.  Managed with Losartan and Metoprolol - Dr. Medrano cardiology refills and manages his HTN     Regarding Allergic Rhinitis, managed daily with Loratadine  has occasional flares - no complaints - take OTC         Complaint of hypoglycemia..  The symptom began years ago.  The severity is of severe intensity.  Reports that this frequently happens after meals - felt to be related to his surgery (gastrectomy) in his teens - gets as low as in the 40s which he is symptomatic - they keep a steady supply of glucose tabs with them at all times.      ROS:     CONSTITUTIONAL:  Negative for chills, fatigue and fever.      E/N/T:  Positive for diminished hearing.       CARDIOVASCULAR:  Negative for chest pain and pedal edema.      RESPIRATORY:  Negative for recent cough and dyspnea.      GASTROINTESTINAL:  Negative for abdominal pain, constipation, diarrhea, heartburn, nausea and vomiting.      GENITOURINARY:  Positive for nocturia and dribbling and (difficulty starting and stopped) of urine stream.   Negative for dysuria.      MUSCULOSKELETAL:  Positive for arthralgias, back pain ( chronic ), joint stiffness, limb pain ( both knees - pain ) and epidural injections in the past  / ablasions in the past.   Negative for myalgias.      INTEGUMENTARY:  Negative for rash.      NEUROLOGICAL:  Positive for paresthesia ( both feet ).   Negative for dizziness, fainting or headaches.      HEMATOLOGIC/LYMPHATIC:  Positive for hx DVT.      ENDOCRINE:  Positive for hypoglycemia - spikes after eat - at least monthly.   Negative for hair loss, polydipsia or polyphagia.      ALLERGIC/IMMUNOLOGIC:  Negative for seasonal allergies.      PSYCHIATRIC:  Negative for anxiety, depression and suicidal thoughts.          PMH/FMH/SH:     Past Medical History:             PAST MEDICAL HISTORY         Positive for    Hypertension and    Dr. Medrano - heart cath x 2;     Positive for    Pancreatitis: collapsed duct r/t choley; and    cdiff;          DVT - (after fractured leg)     Positive for    Allergies and    left ear with a lesion removed - precancerous - around 2013 - Dr. Osorio;         CURRENT MEDICAL PROVIDERS:    Cardiologist: Dr. Medrano - every 6 months   (45% EF)    Gastroenterologist: Alonso BETANCOURT    [facility] pain specialist         PREVENTIVE HEALTH MAINTENANCE             COLORECTAL CANCER SCREENING: colonoscopy with normal results     DENTAL CLEANING: has full dentures     EYE EXAM: was last done 2015     Hepatitis C Medicare Screening: no         Surgical History:         Positive for    Cholecystectomy;     Positive for    left lower arm  fracture with plates (removed  "after); and    stomch removal ()  related to PUD  as a teenager;;         Family History:     Father: Cause of death was Lung Cancer (smoker);  Lung Cancer;  DVT     Mother: COPD;  Osteoarthritis     Brother(s): 1 ;  Lung Cancer     Sister(s): Neurological/Genetic Migraines; Endocrine Graves         Social History:     Occupation: [employer]     Marital Status:      Children: 1 child         Tobacco/Alcohol/Supplements:     Last Reviewed on 2018 04:04 PM by Janna Weaver    Tobacco: He has never smoked.          Alcohol: Frequency:    rarely;         Substance Abuse History:     None         Mental Health History:         \"down \"         Communicable Diseases (eg STDs):     Reportable health conditions; NEGATIVE             Current Problems:     cardiomyopathy     Constipation     Hypertension     Hypoglycemia     Neuralgia     Allergic rhinitis, unspecified cause     Screening for depression (18)         Immunizations:     Havrix -adult dose (HepA) 2018     PNEUMOVAX 23 (Pneumococcal PPV23) 2018         Allergies:     Last Reviewed on 2018 03:59 PM by Janna Weaver      No Known Drug Allergies.         Current Medications:     Last Reviewed on 2018 04:02 PM by Janna Weaver    Losartan 25mg Tablet Take 1 tablet(s) by mouth daily     Amitriptyline HCl 50mg Tablet 1 tab daily     Docusate Sodium 100mg Tablet 2 tabs daily     Florajen one tab daily     Hydrocodone/Acetaminophen 7.5mg/325mg Tablet 1 BID PRN     Loratadine 10mg Tablet 1 tab daily     Metoprolol 25mg Tablet 1/2 tab daily     Spironolactone 25mg Tablet 1 every other day     Xarelto 10mg Tablet 1 tab daily         OBJECTIVE:        Vitals:         Current: 2018 3:59:26 PM    Ht:  6 ft, 4 in;  Wt: 306 lbs;  BMI: 37.2    T: 97.6 F (oral);  BP: 122/71 mm Hg (right arm, sitting);  P: 67 bpm (right arm (BP Cuff), sitting)        Exams:     PHYSICAL EXAM:     GENERAL: Vitals recorded well developed, well " nourished, obese;  no apparent distress;     EYES: PERRL, EOMI     E/N/T: EARS: external auditory canal normal bilaterally, edematous, and erythematous;  bilateral TMs are normal;  NOSE:  normal nasal mucosa, septum, turbinates, and sinuses; OROPHARYNX:  normal mucosa, dentition, gingiva, and posterior pharynx;     NECK: range of motion is normal;     RESPIRATORY: normal appearance and symmetric expansion of chest wall; normal respiratory rate and pattern with no distress; normal breath sounds with no rales, rhonchi, wheezes or rubs;     CARDIOVASCULAR: normal rate; rhythm is regular;  1+ pedal edema;     GASTROINTESTINAL: nontender; normal bowel sounds;     LYMPHATIC: no enlargement of cervical or facial nodes; no supraclavicular nodes;     MUSCULOSKELETAL: normal gait; normal range of motion of all major muscle groups; no limb or joint pain with range of motion;     NEUROLOGIC: mental status: alert and oriented x 3;     PSYCHIATRIC: appropriate affect and demeanor; normal speech pattern; normal thought and perception;         Procedures:     Vaccination against streptococcus pneumoniae     1. Pneumovax (pneumococcal PPSV23): 0.5 ml unit dose given IM in the right upper arm; administered by [facility];  lot number [id number]; expires 1/6/20         Vaccination against hepatitis A     1. Hepatitis A (adult): 1.0 ml given IM in the left upper arm; administered by [facility];  lot number [id number]; expires 1/29/21             ASSESSMENT           V79.0   Z13.89  Screening for depression              DDx:     V79.0   Z13.89  Screening for depression (9/25/18)              DDx:     729.2   M79.2  Neuralgia              DDx:     401.1   I10  Hypertension              DDx:     477.9   J30.9  Allergic rhinitis, unspecified cause              DDx:     251.2   E16.2  Hypoglycemia              DDx:     V03.82   Z23  Vaccination against streptococcus pneumoniae              DDx:     V05.3   Z23  Vaccination against hepatitis A              DDx:          ORDERS:         Procedures Ordered:       96201  Pneumococcal polysaccharide vaccine, 23-valent, adult or immunosuppressed patient dosage, for use in  (In-House)         75051  HepA vaccine adult dose for intramuscular use  (In-House)         90471  Immunization administration; one vaccine  (In-House)         98228  Immunization administration; each additional vaccine/toxoid  (In-House)           Other Orders:         Depression screen positive and follow up plan documented  (In-House)                   PLAN:          Screening for depression     MIPS PHQ-9 Depression Screening: Completed form scanned and in chart; Total Score 7 Positive Depression Screen: Declines need for mediation  - feels that it is related to his chronic conditions and does not desire any medication at this time - will monitor           Orders:         Depression screen positive and follow up plan documented  (In-House)            Screening for depression as above          Neuralgia continue to follow u rebecca Pain management as recommended          Hypertension follow up with Dr. Medrano as recommended          Allergic rhinitis, unspecified cause continue current treatment  follow up PRN          Hypoglycemia highly recommend making sure glucose tabs available at all times  - avoid high carb meals which triggers post prandial hypoglycemia - follow up PRN          Vaccination against streptococcus pneumoniae         IMMUNIZATIONS given today: Pneumovax.            Orders:       87954  Pneumococcal polysaccharide vaccine, 23-valent, adult or immunosuppressed patient dosage, for use in  (In-House)         10740  Immunization administration; one vaccine  (In-House)            Vaccination against hepatitis A         IMMUNIZATIONS given today: Hepatitis A.            Orders:       96134  HepA vaccine adult dose for intramuscular use  (In-House)         14289  Immunization administration; each additional vaccine/toxoid  (In-House)                CHARGE CAPTURE           **Please note: ICD descriptions below are intended for billing purposes only and may not represent clinical diagnoses**        Primary Diagnosis:         V79.0 Screening for depression            Z13.89    Encounter for screening for other disorder              Orders:          20501   Office visit - new pt, level 3  (In-House)                Depression screen positive and follow up plan documented  (In-House)           V79.0 Screening for depression (9/25/18)            Z13.89    Encounter for screening for other disorder    729.2 Neuralgia            M79.2    Neuralgia and neuritis, unspecified    401.1 Hypertension            I10    Essential (primary) hypertension    477.9 Allergic rhinitis, unspecified cause            J30.9    Allergic rhinitis, unspecified    251.2 Hypoglycemia            E16.2    Hypoglycemia, unspecified    V03.82 Vaccination against streptococcus pneumoniae            Z23    Encounter for immunization              Orders:          99758   Pneumococcal polysaccharide vaccine, 23-valent, adult or immunosuppressed patient dosage, for use in  (In-House)             69944   Immunization administration; one vaccine  (In-House)           V05.3 Vaccination against hepatitis A            Z23    Encounter for immunization              Orders:          45007   HepA vaccine adult dose for intramuscular use  (In-House)             95222   Immunization administration; each additional vaccine/toxoid  (In-House)

## 2021-05-18 NOTE — PROGRESS NOTES
Spurling, Timothy RGil 1955     Office/Outpatient Visit    Visit Date: Tue, Mar 5, 2019 12:54 pm    Provider: Eda Gu N.P. (Assistant: Shannon Sigala LPN)    Location: [facility]        Electronically signed by Eda Gu N.P. on  03/07/2019 01:27:20 AM                             SUBJECTIVE:        CC:     Cyril is a 63 year old White male.  He is here today following a transition of care from an inpatient hospital: [facility]. 2/27/19. The patient was admitted for total knee replaement. Our office called the patient within 48 hours of discharge and scheduled the follow-up appointment. During the patient's hospital stay the patient was treated by .          HPI:         Cyril presents in follow up from hospital admission. He was admitted to the hospital on 2/27/19 and discharged on 2/28/19 - [facility].  He was diagnosed with right total knee replacement.  The patient received IV medications, which included antibiotics and pain control, PT (Started 3/1/19).  Associated symptoms include extensive discoloration of the posterior thigh of right leg, drainage of the knee and a weeping area on the lower part of his leg - wife is concerned that the drainage is 'clear yellow'.  Cyril is also having a pain in his right lower back as well he feels is probably from the compensation from having to walk differently on his knee scheduled for follow up with Dr. De Luna next week.  - He did have the nurse from Dr. Spear office go to PT today to change the dressing - it was saturated with the clear drainage from his knee     ROS:     CONSTITUTIONAL:  Negative for chills, fatigue and fever.      CARDIOVASCULAR:  Positive for pedal edema ( right lower leg with extensive edema/swelling ).   Negative for chest pain, orthopnea or paroxysmal nocturnal dyspnea.      RESPIRATORY:  Negative for dyspnea and cough.      MUSCULOSKELETAL:  Positive for arthralgias, back pain ( chronic; recurrent ), joint  "stiffness and limb pain ( right leg pain ).      INTEGUMENTARY:  Positive for s/p surgical site to right knee;  small weeping area to right lower leg.      NEUROLOGICAL:  Positive for weakness.          PMH/FMH/SH:     Past Medical History:             PAST MEDICAL HISTORY         Positive for    Hypertension and    Dr. Medrano - heart cath x 2;     Positive for    Pancreatitis: collapsed duct r/t choley; and    cdiff;          DVT - (after fractured leg)     Positive for    Allergies and    left ear with a lesion removed - precancerous - around  - Dr. Osorio;         CURRENT MEDICAL PROVIDERS:    Cardiologist: Dr. Medrano - every 6 months   (Sep 2018  40-45% EF)    Gastroenterologist: Alonso Slater RISARAH    [facility] pain specialist         PREVENTIVE HEALTH MAINTENANCE             COLORECTAL CANCER SCREENING: colonoscopy with normal results     DENTAL CLEANING: has full dentures     EYE EXAM: was last done      Hepatitis C Medicare Screening: no         Surgical History:         Positive for    Cholecystectomy;     Positive for    left lower arm  fracture with plates (removed after); and    stomch removal ()  related to PUD  as a teenager;;         Family History:     Father: Cause of death was Lung Cancer (smoker);  Lung Cancer;  DVT     Mother: COPD;  Osteoarthritis     Brother(s): 1 ;  Lung Cancer     Sister(s): Neurological/Genetic Migraines; Endocrine Graves         Social History:     Occupation: [employer]     Marital Status:      Children: 1 child         Tobacco/Alcohol/Supplements:     Last Reviewed on 3/05/2019 01:04 PM by Shannon Sigala April    Tobacco: He has never smoked.          Alcohol: Frequency:    rarely;         Substance Abuse History:     None         Mental Health History:         \"down \"         Communicable Diseases (eg STDs):     Reportable health conditions; NEGATIVE             Current Problems:     Atrial fibrillation     Artificial joint " replacement, Knee     Joint pain, other specified site     Fatigue     Lumbar radiculopathy     Lumbosacral spondylosis without arthropathy     Low back pain (spondylosis)     cardiomyopathy (EF 40- 45% 9/18)     Hypoglycemia     Constipation     Neuralgia (polyneuropathy)     Hypertension     Follow-up examination     Knee pain     Allergic rhinitis, unspecified cause     Screening for depression (9/25/18)         Immunizations:     Havrix -adult dose (HepA) 9/25/2018     PNEUMOVAX 23 (Pneumococcal PPV23) 9/25/2018         Allergies:     Last Reviewed on 3/05/2019 01:04 PM by Shannon Sigala April      No Known Drug Allergies.         Current Medications:     Last Reviewed on 3/05/2019 01:05 PM by Shanonn Sigala April    Losartan 25mg Tablet Take 1 tablet(s) by mouth daily     Cyclobenzaprine HCl 10mg Tablet po every hs prn     Miralax Powder for Oral Solution 1 capful (17g) daily, mixed in 4-8oz of liquid daily     Amitriptyline HCl 75mg Tablet     Florajen 460 mg one tab daily     Hydrocodone/Acetaminophen 7.5mg/325mg Tablet 1 BID PRN     Loratadine 10mg Tablet 1 tab daily     Metoprolol 25mg Tablet 1/2 tab daily     Spironolactone 25mg Tablet every day except Sunday     Xarelto 10mg Tablet 1 tab daily         OBJECTIVE:        Vitals:         Historical:     01/22/2019  P:   69bpm ( (left arm (BP Cuff), , sitting, );)         Current: 3/5/2019 1:03:40 PM    Ht:  6 ft, 4 in;  Wt: 319.6 lbs;  BMI: 38.9    T: 97.4 F (oral);  BP: 120/48 mm Hg (left arm, sitting);  P: 41 bpm (left arm (BP Cuff), sitting);  sCr: 1.11 mg/dL;  GFR: 90.68        Repeat: apical 78 (irregular) / mjm         Exams:     PHYSICAL EXAM:     GENERAL: Vitals recorded well developed, well nourished;  well groomed;  no apparent distress;     NECK:  supple, full ROM; no thyromegaly; no carotid bruits;     RESPIRATORY: normal respiratory rate and pattern with no distress; normal breath sounds with no rales, rhonchi, wheezes or rubs;      CARDIOVASCULAR: rate is 78 apical bpm;;  rhythm is regular;    right lower leg  3+ edema;     SKIN: right knee surgical site looks very good - staples in tact,,  slight seerous drainage at the surgical site - ; there is also an opening at the lower anterior surface of the right leg weeping serous fluid - no s/s infection to either area;     MUSCULOSKELETAL:  Normal range of motion, strength and tone;     NEUROLOGICAL:  cranial nerves, motor and sensory function, reflexes, gait and coordination are all intact;     PSYCHIATRIC:  appropriate affect and demeanor; normal speech pattern; grossly normal memory;         ASSESSMENT:           V43.65   Z96.659  Artificial joint replacement, Knee              DDx:     724.2   M54.5  Low back pain (spondylosis)              DDx:     427.31   I48.91  Atrial fibrillation              DDx:         PLAN:          Artificial joint replacement, Knee follow up with Dr. De Luna as recommended discussed that the drainage he sees is very common - we would like to see less and less each day, however, it may take a while to completely stop with his history of cardiac problems and lower extremity swelling.  He is unable to wear the compression stockings, but i did encourage him to use an ACE wrap to help minimize the swelling to his lower leg. avoid too much salt/sodium to minimize as well.           Low back pain (spondylosis) Continue with the medication as prescribed - PT may also be able to help with this - encouraged him to discuss with PT ways to prevent the back pain from becoming worse during recovery          Atrial fibrillation discussed need to continue on medication as prescribed - bruising probably related to chronic use of medication             CHARGE CAPTURE:           Primary Diagnosis:     V43.65 Artificial joint replacement, Knee            Z96.659    Presence of unspecified artificial knee joint              Orders:          42255   Transitional care manage service 7  day discharge  (In-House)           724.2 Low back pain (spondylosis)            M54.5    Low back pain    427.31 Atrial fibrillation            I48.91    Unspecified atrial fibrillation

## 2021-05-18 NOTE — PROGRESS NOTES
TaJamari martínezGil 1955     Office/Outpatient Visit    Visit Date: Tue, May 21, 2019 04:08 pm    Provider: Eda Gu N.P. (Assistant: Shonda Herring)    Location: [facility]        Electronically signed by Eda Gu N.P. on  05/21/2019 07:11:44 PM                             SUBJECTIVE:        CC:     Cyril is a 63 year old White male.  This is a follow-up visit.  Low back pain         HPI:         Patient complains of low back pain (spondylosis).  Reason for visit: Pain.  The discomfort is most prominent in the lumbar spine.  Other details: has been going to pain management needs his MRI sent to [facility] to be evaluated for possible epidural - the current treatment is not working.   W have discussed referral to neurosurgeon if this continues and will consider after trial of next step for pain management.      ROS:     CONSTITUTIONAL:  Negative for chills, fatigue and fever.      CARDIOVASCULAR:  Negative for chest pain and pedal edema.      RESPIRATORY:  Negative for recent cough and dyspnea.      GASTROINTESTINAL:  Negative for abdominal pain, constipation, diarrhea, heartburn, nausea and vomiting.      GENITOURINARY:  Negative for dysuria and change in urine stream.      MUSCULOSKELETAL:  Positive for back pain and joint stiffness (knees , back).      INTEGUMENTARY:  Positive for rash (left lower leg).   Negative for pruritis.      NEUROLOGICAL:  Positive for paresthesia ( bilateral lower extremity ) and weakness ( both legs ).   Negative for dizziness, fainting or headaches.      ENDOCRINE:  Negative for hair loss, polydipsia and polyphagia.      ALLERGIC/IMMUNOLOGIC:  Negative for seasonal allergies.      PSYCHIATRIC:  Negative for anxiety, depression and suicidal thoughts.          PMH/FMH/SH:     Past Medical History:             PAST MEDICAL HISTORY         Positive for    Hypertension and    Dr. Medrano - heart cath x 2;     Positive for    Pancreatitis: collapsed duct  "r/t choley; and    cdiff;          DVT - (after fractured leg)     Positive for    Allergies and    left ear with a lesion removed - precancerous - around  - Dr. Osorio;         CURRENT MEDICAL PROVIDERS:    Cardiologist: Dr. Medrano - every 6 months   (Sep 2018  40-45% EF)    Gastroenterologist: Alonso BETANCOURT    [facility] pain specialist         PREVENTIVE HEALTH MAINTENANCE             COLORECTAL CANCER SCREENING: colonoscopy with normal results     DENTAL CLEANING: has full dentures     EYE EXAM: was last done      Hepatitis C Medicare Screening: no         Surgical History:         Positive for    Cholecystectomy;     Positive for    left lower arm  fracture with plates (removed after); and    stomch removal ()  related to PUD  as a teenager;;         Family History:     Father: Cause of death was Lung Cancer (smoker);  Lung Cancer;  DVT     Mother: COPD;  Osteoarthritis     Brother(s): 1 ;  Lung Cancer     Sister(s): Neurological/Genetic Migraines; Endocrine Graves         Social History:     Occupation: [employer]     Marital Status:      Children: 1 child         Tobacco/Alcohol/Supplements:     Last Reviewed on 2019 04:08 PM by Shonda Herring    Tobacco: He has never smoked.          Alcohol: Frequency:    rarely;         Substance Abuse History:     None         Mental Health History:         \"down \"         Communicable Diseases (eg STDs):     Reportable health conditions; NEGATIVE             Current Problems:     Atrial fibrillation     Artificial joint replacement, Knee     Joint pain, other specified site     Fatigue     Lumbar radiculopathy     Lumbosacral spondylosis without arthropathy     Low back pain (spondylosis)     cardiomyopathy (EF 40- 45% )     Hypoglycemia     Constipation     Neuralgia (polyneuropathy)     Hypertension     Rash     Allergic rhinitis, unspecified cause     Screening for depression (18)         " Immunizations:     Havrix -adult dose (HepA) 9/25/2018     Havrix -adult dose (HepA) 4/12/2019     Fluzone Quadrivalent (3+ years) 11/1/2018     PNEUMOVAX 23 (Pneumococcal PPV23) 9/25/2018         Allergies:     Last Reviewed on 5/21/2019 04:13 PM by Shonda Herring      No Known Drug Allergies.         Current Medications:     Last Reviewed on 5/21/2019 04:17 PM by Shonda Herring    Losartan 25mg Tablet Take 1 tablet(s) by mouth daily     Cyclobenzaprine HCl 10mg Tablet po every hs prn     Miralax Powder for Oral Solution 1 capful (17g) daily, mixed in 4-8oz of liquid daily     Amitriptyline HCl 75mg Tablet     Florajen 460 mg one tab daily     Hydrocodone/Acetaminophen 7.5mg/325mg Tablet 1 BID PRN     Loratadine 10mg Tablet 1 tab daily     Metoprolol 25mg Tablet 1/2 tab daily     Spironolactone 25mg Tablet every day except Sunday     Xarelto 10mg Tablet 1 tab daily     Lasix 20mg Tablet 1 tab every other day         OBJECTIVE:        Vitals:         Current: 5/21/2019 4:15:04 PM    Ht:  6 ft, 4 in;  Wt: 314 lbs;  BMI: 38.2    T: 98 F (oral);  BP: 114/58 mm Hg (right arm, sitting);  P: 63 bpm (right arm (BP Cuff), sitting);  sCr: 1.11 mg/dL;  GFR: 90.00    O2 Sat: 97 % (room air)        Exams:     PHYSICAL EXAM:     GENERAL: Vitals recorded well developed, well nourished;  no apparent distress;     NECK: range of motion is normal;     RESPIRATORY: normal appearance and symmetric expansion of chest wall; normal respiratory rate and pattern with no distress; normal breath sounds with no rales, rhonchi, wheezes or rubs;     CARDIOVASCULAR: normal rate; rhythm is regular;  no edema;     LYMPHATIC: no enlargement of cervical or facial nodes; no supraclavicular nodes;     MUSCULOSKELETAL: gait: slowed and stooped;  normal range of motion of all major muscle groups; pain with range of motion in: the back;     NEUROLOGIC: mental status: alert and oriented x 3;     PSYCHIATRIC: appropriate affect and demeanor;  normal speech pattern; normal thought and perception;         ASSESSMENT           724.2   M54.5  Low back pain (spondylosis)              DDx:     782.1   R21  Rash              DDx:         ORDERS:         Meds Prescribed:       Refill of: Cyclobenzaprine HCl 10mg Tablet po every hs prn  #30 (Thirty) tablet(s) Refills: 2                 PLAN:          Low back pain (spondylosis) will consider neurosurgeon referral after pain manangement evaluate           Prescriptions:       Refill of: Cyclobenzaprine HCl 10mg Tablet po every hs prn  #30 (Thirty) tablet(s) Refills: 2          Rash may be due to extra activity - recommend that he continue to watch, if new symptoms develop, may need to have this evaluated further by derm             CHARGE CAPTURE           **Please note: ICD descriptions below are intended for billing purposes only and may not represent clinical diagnoses**        Primary Diagnosis:         724.2 Low back pain (spondylosis)            M54.5    Low back pain              Orders:          75223   Office/outpatient visit; established patient, level 3  (In-House)           782.1 Rash            R21    Rash and other nonspecific skin eruption

## 2021-05-18 NOTE — PROGRESS NOTES
Spurling, Timothy RGil 1955     Office/Outpatient Visit    Visit Date: Fri, Nov 16, 2018 04:05 pm    Provider: Eda Gu N.P. (Assistant: Leidy Purcell LPN)    Location: [facility]        Electronically signed by Eda Gu N.P. on  11/20/2018 09:53:32 AM                             SUBJECTIVE:        CC:     Cyril is a 62 year old White male.  He presents with low back pain.          HPI:         Patient complains of low back pain.  Reason for visit: Pain.  The discomfort is most prominent in the lower, left lumbar spine.  This radiates to the left anterior thigh.  He characterizes it as intermittent, moderate in intensity, and gnawing.  The pain level between 1 and 10 is a 8.  This is a chronic, but intermittent problem with an acute exacerbation.  He does not recall any precipitating event or injury.  Associated symptoms include stiffness and radicular left leg pain.      ROS:     CONSTITUTIONAL:  Negative for chills, fatigue, fever and weight change.      CARDIOVASCULAR:  Negative for chest pain, orthopnea, paroxysmal nocturnal dyspnea and pedal edema.      RESPIRATORY:  Negative for dyspnea and cough.      GASTROINTESTINAL:  Negative for abdominal pain, heartburn, constipation, diarrhea, and stool changes.      MUSCULOSKELETAL:  Positive for back pain ( chronic; recurrent ) and limb pain ( left leg pain ).          PMH/FMH/SH:     Past Medical History:             PAST MEDICAL HISTORY         Positive for    Hypertension and    Dr. Medrano - heart cath x 2;     Positive for    Pancreatitis: collapsed duct r/t choley; and    cdiff;          DVT - (after fractured leg)     Positive for    Allergies and    left ear with a lesion removed - precancerous - around 2013 - Dr. Osorio;         CURRENT MEDICAL PROVIDERS:    Cardiologist: Dr. Medrano - every 6 months   (45% EF)    Gastroenterologist: Alonso BETANCOURT    [facility] pain specialist         PREVENTIVE HEALTH MAINTENANCE  "            COLORECTAL CANCER SCREENING: colonoscopy with normal results     DENTAL CLEANING: has full dentures     EYE EXAM: was last done      Hepatitis C Medicare Screening: no         Surgical History:         Positive for    Cholecystectomy;     Positive for    left lower arm  fracture with plates (removed after); and    stomch removal ()  related to PUD  as a teenager;;         Family History:     Father: Cause of death was Lung Cancer (smoker);  Lung Cancer;  DVT     Mother: COPD;  Osteoarthritis     Brother(s): 1 ;  Lung Cancer     Sister(s): Neurological/Genetic Migraines; Endocrine Graves         Social History:     Occupation: [employer]     Marital Status:      Children: 1 child         Tobacco/Alcohol/Supplements:     Last Reviewed on 2018 04:04 PM by Janna Weaver    Tobacco: He has never smoked.          Alcohol: Frequency:    rarely;         Substance Abuse History:     None         Mental Health History:         \"down \"         Communicable Diseases (eg STDs):     Reportable health conditions; NEGATIVE             Current Problems:     cardiomyopathy     Constipation     Hypertension     Hypoglycemia     Neuralgia     Allergic rhinitis, unspecified cause     Screening for depression (18)         Immunizations:     Havrix -adult dose (HepA) 2018     PNEUMOVAX 23 (Pneumococcal PPV23) 2018         Allergies:     Last Reviewed on 2018 03:59 PM by Janna Weaver      No Known Drug Allergies.         Current Medications:     Last Reviewed on 2018 04:02 PM by Janna Weaver    Losartan 25mg Tablet Take 1 tablet(s) by mouth daily     Amitriptyline HCl 50mg Tablet 1 tab daily     Docusate Sodium 100mg Tablet 2 tabs daily     Florajen one tab daily     Hydrocodone/Acetaminophen 7.5mg/325mg Tablet 1 BID PRN     Loratadine 10mg Tablet 1 tab daily     Metoprolol 25mg Tablet 1/2 tab daily     Spironolactone 25mg Tablet 1 every other day     Xarelto 10mg Tablet 1 " tab daily         OBJECTIVE:        Vitals:         Current: 11/16/2018 4:07:36 PM    Ht:  6 ft, 4 in;  Wt: 312.6 lbs;  BMI: 38.1    T: 97.4 F (oral);  BP: 122/64 mm Hg (right arm, sitting);  P: 72 bpm (right arm (BP Cuff), sitting)        Exams:     PHYSICAL EXAM:     GENERAL: Vitals recorded well developed, well nourished;  well groomed;  no apparent distress;     NECK:  supple, full ROM; no thyromegaly; no carotid bruits;     RESPIRATORY: normal respiratory rate and pattern with no distress; normal breath sounds with no rales, rhonchi, wheezes or rubs;     CARDIOVASCULAR: normal rate; rhythm is regular;  normal S1; normal S2; no systolic murmur; no cyanosis; no edema;     GASTROINTESTINAL: nontender, nondistended; no hepatosplenomegaly or masses; no bruits;     MUSCULOSKELETAL: gait: slowed and stooped;  pain with range of motion in: back flexion;     NEUROLOGICAL:  cranial nerves, motor and sensory function, reflexes, gait and coordination are all intact;     PSYCHIATRIC:  appropriate affect and demeanor; normal speech pattern; grossly normal memory;         Procedures:     Low back pain     1. Toradol 60 mg given IM in the right hip; administered by mnp;  lot number [id number]; expires 6/20             ASSESSMENT:           724.2   M54.5  Low back pain              DDx:         ORDERS:         Meds Prescribed:       Medrol (Methylprednisolone) 4mg Dosepak Take as directed with food  #1 (One) dose pack Refills: 0       Cyclobenzaprine HCl 10mg Tablet 1/2 to 1 tablet AT HS prn  #20 (Twenty) tablet(s) Refills: 0         Other Orders:       [id number]  Therapeutic injection  (In-House)           Toradol, per 15 mg (x4)                 PLAN:          Low back pain currently on pain medication but will give something for the acute inflammation - consider PT if no improvement     Narcotic or pain medication Toradol 60 mg           Prescriptions:       Medrol (Methylprednisolone) 4mg Dosepak Take as directed with food  #1  (One) dose pack Refills: 0       Cyclobenzaprine HCl 10mg Tablet 1/2 to 1 tablet AT HS prn  #20 (Twenty) tablet(s) Refills: 0           Orders:       [id number]  Therapeutic injection  (In-House)                     Toradol, per 15 mg (x4)           Patient Education Handouts:       [facility] Medication Compliance              CHARGE CAPTURE:           Primary Diagnosis:     724.2 Low back pain            M54.5    Low back pain              Orders:          [id number]   Office/outpatient visit; established patient, level 3  (In-House)             87261   Therapeutic injection  (In-House)                                           Toradol, per 15 mg (x4)
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
## 2021-05-18 NOTE — PROGRESS NOTES
Spurling, Timothy R 1955     Preventive Medicine Services    Visit Date: Tue, Sep 15, 2020 9:53 am    Provider: Eda Gu N.P. (Assistant: Stacey Carson MA )    Location:         Electronically signed by Eda Gu N.P. on  09/15/2020 01:07:52 PM                             Subjective:        CC: Cyril is a 64 year old White male.  check up         HPI:           Cyril presents with encounter for general adult medical examination without abnormal findings.  His last physical exam was last year.  He's had vision screening done more than 5 years ago.  Depression screen is performed and is negative.  He is current with his influenza.  He is not current with pneumococcal immunization.            PHQ-9 Depression Screening: Completed form scanned and in chart; Total Score 0           Complaint of hypoglycemia, unspecified..  has been a problem since surgery on stomach as a child - keeps glucose tabs / candy on hand           Concerning essential (primary) hypertension, this was first diagnosed several years ago.  Current nonpharmacologic treatment includes relaxation therapy and a 70 pound weight loss.  His current cardiac medication regimen includes a diuretic and an angiotensin receptor blocker.  Compliance with treatment has been good.  He is tolerating the medication well without side effects.  He did not bring his blood pressure diary, but says that pressures have been okay.  Dr. Medrano recently changed his blood pressure medication due to low BP thought to be from his recent weight loss.  He does check his BP daily and reports to Dr. Medrano in 2-4 weeks          Complaint of neuralgia and neuritis, unspecified..  stable  due to his low back problems           Complaint of low back pain..  continues with chronic pain medication  - followed by [facility]  Goes every 2 months.  Only takes medication PRN  Has improved slightly with weight loss but mostly due to his retirment            Complaint of unspecified atrial fibrillation..  Afib- managed by Dr. Medrano Cardiology.  Follows up in office every 6 months.  Recently changed from Xarelto to Eliquis due to cost.  Was taken off metoprolol due to BP           Complaint of presence of unspecified artificial knee joint..  history of right knee replacement - no current complaints ;  doing well           Complaint of cardiomyopathy, unspecified..  followed by Dr. Medrano         Regarding BPH - has been on medication in the past but thinks may have been stopped due to his BP - he is unsure what he was on before -thinks it was 2 medications.   3/2020 PSA normal Has been having off and on urinary symtpoms for some time.  Would like to try to get back on medication due to severity of his symptoms.  He is having to plan his stops during any road trip and currently with not all  rest rooms open, this has become a real problem for him    ROS:     CONSTITUTIONAL:  Positive for unintentional weight loss ( rapid ) and losing about 10lbs per month;.   Negative for chills, fatigue or fever.      E/N/T:  Positive for diminished hearing.   Negative for nasal congestion.      CARDIOVASCULAR:  Negative for chest pain and pedal edema.      RESPIRATORY:  Negative for recent cough and dyspnea.      GASTROINTESTINAL:  Positive for constipation.   Negative for abdominal pain, diarrhea, heartburn, nausea or vomiting.      GENITOURINARY:  Positive for nocturia and (every hour) urgency.   Negative for dysuria or change in urine stream.      MUSCULOSKELETAL:  Negative for arthralgias and myalgias.      INTEGUMENTARY:  Negative for pruritis and rash.      NEUROLOGICAL:  Negative for dizziness, fainting, headaches and paresthesias.      ENDOCRINE:  Negative for hair loss, polydipsia and polyphagia.      ALLERGIC/IMMUNOLOGIC:  Negative for seasonal allergies.      PSYCHIATRIC:  Negative for anxiety, depression and suicidal thoughts.          Past Medical History / Family History /  "Social History:         Last Reviewed on 9/15/2020 10:10 AM by Eda Gu    Past Medical History:             PAST MEDICAL HISTORY         Positive for    Hypertension and    Dr. Medrano - heart cath x 2;     Positive for    Pancreatitis: collapsed duct r/t choley; and    cdiff;         DVT - (after fractured leg)     Positive for    Allergies and    left ear with a lesion removed - precancerous - around  - Dr. Osorio;         CURRENT MEDICAL PROVIDERS:    Cardiologist: Dr. Medrano - every 6 months   (2019 60% EF)    Gastroenterologist: Alonso BETANCOURT    [facility] pain specialist - Every 2 months         PREVENTIVE HEALTH MAINTENANCE             COLORECTAL CANCER SCREENING: Up to date (colonoscopy q10y; sigmoidoscopy q5y; Cologuard q3y) was last done , Results are in chart; colonoscopy with normal results     DENTAL CLEANING: has full dentures     EYE EXAM: was last done      Hepatitis C Medicare Screening: was last done          Surgical History:         Positive for    Cholecystectomy;     Positive for    left lower arm  fracture with plates (removed after); and    stomch removal ()  related to PUD  as a teenager;;         Family History:     Father: Cause of death was Lung Cancer (smoker);  Lung Cancer;  DVT     Mother: COPD;  Osteoarthritis     Brother(s): 1 ;  Lung Cancer     Sister(s): Neurological/Genetic Migraines; Endocrine Graves         Social History:     Occupation: [employer]    . Retired. Disabled (due to back)     Marital Status:      Children: 1 child         Tobacco/Alcohol/Supplements:     Last Reviewed on 9/15/2020 10:10 AM by Eda Gu    Tobacco: He has never smoked.          Alcohol: Frequency:    rarely;         Substance Abuse History:     Last Reviewed on 9/15/2020 10:10 AM by Eda Gu    None         Mental Health History:     Last Reviewed on 9/15/2020 10:10 AM by Eda Gu        \"down \"     "     Communicable Diseases (eg STDs):     Last Reviewed on 9/15/2020 10:10 AM by Eda Gu    Reportable health conditions; NEGATIVE         Current Problems:     Last Reviewed on 9/15/2020 10:10 AM by Eda Gu    Hypoglycemia, unspecified    Essential (primary) hypertension    Neuralgia and neuritis, unspecified    Encounter for screening for other disorder    Low back pain    Radiculopathy, lumbar region    Unspecified atrial fibrillation    Presence of unspecified artificial knee joint    Cardiomyopathy, unspecified    Encounter for immunization    Benign prostatic hyperplasia with lower urinary tract symptoms    Encounter for screening for depression        Immunizations:     zoster recombinant 7/27/2020    Havrix -adult dose (HepA) 9/25/2018    Havrix -adult dose (HepA) 4/12/2019    Fluzone Quadrivalent (3+ years) 11/1/2018    Fluzone Quadrivalent (3+ years) 10/11/2019    PNEUMOVAX 23 (Pneumococcal PPV23) 9/25/2018        Allergies:     Last Reviewed on 9/15/2020 10:10 AM by Eda Gu    No Known Allergies.        Current Medications:     Last Reviewed on 9/15/2020 10:10 AM by Eda Gu    losartan 25 mg oral tablet [Take 1/2 tab BID]    spironolactone 25 mg oral tablet [1/2 tab per day]    loratadine 10 mg oral tablet [1 tab daily]    HYDROcodone-acetaminophen 7.5-325 mg oral tablet [1 BID PRN]    amitriptyline 75mg Tablet [1 time daily]    Miralax 17 gram/dose oral Powder [1 capful (17g) daily, mixed in 4-8oz of liquid daily]    Florajen3 460 mg (7.5-6- 1.5 bill. cell) oral capsule [1 capsule daily]    ELIQUIS      TAB 2.5MG  [BID]        Objective:        Vitals:         Historical:     12/20/2019  Wt:   317lbs    Current: 9/15/2020 9:59:24 AM    Ht:  6 ft, 4 in;  Wt: 237.4 lbs;  BMI: 28.9T: 96 F (temporal);  BP: 110/72 mm Hg (left arm, sitting);  P: 59 bpm (left arm (BP Cuff), sitting);  sCr: 1.18 mg/dL;  GFR: 74.24        Exams:     PHYSICAL EXAM:     GENERAL: Vitals  recorded well developed, well nourished;  no apparent distress;     EYES: PERRL, EOMI     E/N/T: EARS: external auditory canal normal bilaterally;  bilateral TMs are normal;  NOSE:  normal nasal mucosa, septum, turbinates, and sinuses; OROPHARYNX:  normal mucosa, dentition, gingiva, and posterior pharynx;     NECK: range of motion is normal;     RESPIRATORY: normal appearance and symmetric expansion of chest wall; normal respiratory rate and pattern with no distress; normal breath sounds with no rales, rhonchi, wheezes or rubs;     CARDIOVASCULAR: normal rate; rhythm is regular;  no edema;     GASTROINTESTINAL: nontender; normal bowel sounds; no organomegaly;     LYMPHATIC: no enlargement of cervical or facial nodes; no supraclavicular nodes;     MUSCULOSKELETAL: normal gait; normal range of motion of all major muscle groups; no limb or joint pain with range of motion;     NEUROLOGIC: mental status: alert and oriented x 3;     PSYCHIATRIC: appropriate affect and demeanor; normal speech pattern; normal thought and perception;         Assessment:         Z00.00   Encounter for general adult medical examination without abnormal findings       Z13.31   Encounter for screening for depression       E16.2   Hypoglycemia, unspecified       I10   Essential (primary) hypertension       M79.2   Neuralgia and neuritis, unspecified       M54.5   Low back pain       M54.16   Radiculopathy, lumbar region       I48.91   Unspecified atrial fibrillation       Z96.659   Presence of unspecified artificial knee joint       I42.9   Cardiomyopathy, unspecified       Z23   Encounter for immunization       N40.1   Benign prostatic hyperplasia with lower urinary tract symptoms           ORDERS:         Meds Prescribed:       [New Rx] Flomax 0.4 mg oral capsule [take 1 capsule (0.4 mg) by oral route once daily 1/2 hour following the same meal each day], #30 (thirty) capsules, Refills: 0 (zero)       [Refilled] Florajen3 460 mg (7.5-6- 1.5  bill. cell) oral capsule [1 capsule daily], #90 (ninety) capsules, Refills: 1 (one)         Radiology/Test Orders:       3017F  Colorectal CA screen results documented and reviewed (PV)  (In-House)              Other Orders:       46437  Influenza virus vaccine, quadrivalent, split virus, preservative free 3 years of age & older  (In-House)              Depression screen negative  (In-House)            1101F  Pt screen for fall risk; document no falls in past year or only 1 fall w/o injury in past year (ROSIE)  (In-House)                      Plan:         Encounter for general adult medical examination without abnormal findings    MIPS Negative Depression Screen           Orders:         Depression screen negative  (In-House)            1101F  Pt screen for fall risk; document no falls in past year or only 1 fall w/o injury in past year (ROSIE)  (In-House)            3017F  Colorectal CA screen results documented and reviewed (PV)  (In-House)              Hypoglycemia, unspecifiedrecommend that he at least eat small frequent meals           Prescriptions:       [Refilled] Florajen3 460 mg (7.5-6- 1.5 bill. cell) oral capsule [1 capsule daily], #90 (ninety) capsules, Refills: 1 (one)         Essential (primary) hypertensioncontinue to monitor BP at home - I am starting on Flomax which may affect BP  he will contact our office if any change as well as let Dr. Medrano know that he has started and see if he prefers an alternative         Neuralgia and neuritis, unspecifiedcontinue with pain management as recommended        Low back paincontinue with pain management as recommended        Radiculopathy, lumbar regioncontinue with pain management as recommended        Unspecified atrial fibrillationcontinue medication as recommended per Dr. Medrano        Presence of unspecified artificial knee jointfollow up PRN        Cardiomyopathy, unspecifiedfollow up with Dr. Medrano as recommended        Encounter for  immunization        IMMUNIZATIONS given today: Influenza Quadrivalent psv free shot 3 & up.            Immunizations:       74609  Influenza virus vaccine, quadrivalent, split virus, preservative free 3 years of age & older  (In-House)                Dose (ml): 0.5  Site: right deltoid  Route: intramuscular  Administered by: Atiya Lloyd          : [employer]  Lot #: [id number]  Exp: 06/30/2021          NDC: [address]-25        Benign prostatic hyperplasia with lower urinary tract symptoms          Prescriptions:       [New Rx] Flomax 0.4 mg oral capsule [take 1 capsule (0.4 mg) by oral route once daily 1/2 hour following the same meal each day], #30 (thirty) capsules, Refills: 0 (zero)             Charge Capture:         Primary Diagnosis:     Z00.00  Encounter for general adult medical examination without abnormal findings           Orders:      56431  Preventive medicine, established patient, age 40-64 years  (In-House)              Depression screen negative  (In-House)            1101F  Pt screen for fall risk; document no falls in past year or only 1 fall w/o injury in past year (ROSIE)  (In-House)            3017F  Colorectal CA screen results documented and reviewed (PV)  (In-House)              Z13.31  Encounter for screening for depression     E16.2  Hypoglycemia, unspecified     I10  Essential (primary) hypertension     M79.2  Neuralgia and neuritis, unspecified     M54.5  Low back pain     M54.16  Radiculopathy, lumbar region     I48.91  Unspecified atrial fibrillation     Z96.659  Presence of unspecified artificial knee joint     I42.9  Cardiomyopathy, unspecified     Z23  Encounter for immunization           Orders:      86449  Influenza virus vaccine, quadrivalent, split virus, preservative free 3 years of age & older  (In-House)              N40.1  Benign prostatic hyperplasia with lower urinary tract symptoms         ADDENDUMS:      ____________________________________     Addendum: 09/21/2020 12:27 ARLEY - Eda Gu        Add  [id number] (Immunization admin, single)

## 2021-05-18 NOTE — PROGRESS NOTES
Spurling, Timothy R  1955     Office/Outpatient Visit    Visit Date: Wed, Apr 21, 2021 09:56 am    Provider: Eda Gu N.P. (Assistant: Stacey Carson MA)    Location: [facility]        Electronically signed by Eda Gu N.P. on  04/21/2021 07:06:13 PM                             Subjective:        CC: Cyril is a 65 year old White male.  This is a follow-up visit.  pt states he is not taking spironolactone and is only taking losartan 1/2 tablet once daily        HPI:           Cyril presents with essential (primary) hypertension.  This was first diagnosed several years ago.  Current nonpharmacologic treatment includes a 100lbs in 1 year pound weight loss.  His current cardiac medication regimen includes an angiotensin receptor blocker.  Compliance with treatment has been good.  He has been experiencing possible adverse medication effects, including dizziness and recently taken off spironolactone due to hypotension - will see Dr. Medrano again next month for reevaluation.  Reports that the dizziness is improved off the spironolactone, but still present.  Review of his blood pressure log reveals systolics in the 70s-120s and diastolics in the 40s-70s.  SEE BP LOG           Complaint of benign prostatic hyperplasia with lower urinary tract symptoms..  continues to take the tamsulosin.   Tried to stop to see if this may have been the cause of his hypotension, but no changes when he stopped .  He would prefer that he stay on this as his symptoms are improved.      ROS:     CONSTITUTIONAL:  Positive for fatigue and unintentional weight loss ( rapid ).   Negative for chills or fever.      CARDIOVASCULAR:  Positive for dizziness ( related to hypotension, Cardiologist changed the Losartan to a half a pill daily and discontinued to spironolactone ).   Negative for chest pain or pedal edema.      RESPIRATORY:  Positive for Shortness of breath during dizzy episodes (related to hypotension).    Negative for recent cough, dyspnea or frequent wheezing.      GASTROINTESTINAL:  Negative for abdominal pain, constipation, diarrhea, heartburn, nausea and vomiting.      GENITOURINARY:  Negative for dysuria and change in urine stream.      MUSCULOSKELETAL:  Negative for arthralgias and myalgias.      INTEGUMENTARY:  Negative for pruritis and rash.      NEUROLOGICAL:  Negative for dizziness, fainting, headaches and paresthesias.      ENDOCRINE:  Negative for hair loss, polydipsia and polyphagia.      ALLERGIC/IMMUNOLOGIC:  Negative for seasonal allergies.      PSYCHIATRIC:  Negative for anxiety, depression and suicidal thoughts.          Past Medical History / Family History / Social History:         Last Reviewed on 9/15/2020 10:10 AM by Eda Gu    Past Medical History:             PAST MEDICAL HISTORY         Positive for    Hypertension and    Dr. Medrano - heart cath x 2;     Positive for    Pancreatitis: collapsed duct r/t choley; and    cdiff;         DVT - (after fractured leg)     Positive for    Allergies and    left ear with a lesion removed - precancerous - around 2013 - Dr. Osorio;         CURRENT MEDICAL PROVIDERS:    Cardiologist: Dr. Medrano - every 6 months   (7/2019 60% EF)    Gastroenterologist: Alonso BETANCOURT    [facility] pain specialist - Every 2 months         PREVENTIVE HEALTH MAINTENANCE             COLORECTAL CANCER SCREENING: Up to date (colonoscopy q10y; sigmoidoscopy q5y; Cologuard q3y) was last done 2018, Results are in chart; colonoscopy with normal results     DENTAL CLEANING: has full dentures     EYE EXAM: was last done 2015     Hepatitis C Medicare Screening: was last done 2020         Surgical History:         Positive for    Cholecystectomy;     Positive for    left lower arm  fracture with plates (removed after); and    stomch removal (1/2)  related to PUD  as a teenager;;         Family History:     Father: Cause of death was Lung Cancer (smoker);   "Lung Cancer;  DVT     Mother: COPD;  Osteoarthritis     Brother(s): 1 ;  Lung Cancer     Sister(s): Neurological/Genetic Migraines; Endocrine Graves         Social History:     Occupation: [employer]    . Retired. Disabled (due to back)     Marital Status:      Children: 1 child         Tobacco/Alcohol/Supplements:     Last Reviewed on 2021 10:01 AM by Stacey Carson    Tobacco: He has never smoked.          Alcohol: Frequency:    rarely;         Substance Abuse History:     Last Reviewed on 9/15/2020 10:10 AM by Eda Gu    None         Mental Health History:     Last Reviewed on 9/15/2020 10:10 AM by Eda Gu        \"down \"         Communicable Diseases (eg STDs):     Last Reviewed on 9/15/2020 10:10 AM by Eda Gu    Reportable health conditions; NEGATIVE         Current Problems:     Last Reviewed on 2021 10:20 AM by Eda Gu    Hypoglycemia, unspecified    Essential (primary) hypertension    Neuralgia and neuritis, unspecified    Encounter for screening for other disorder    Low back pain    Radiculopathy, lumbar region    Unspecified atrial fibrillation    Presence of unspecified artificial knee joint    Cardiomyopathy, unspecified    Benign prostatic hyperplasia with lower urinary tract symptoms    Encounter for immunization    Encounter for screening for depression    Hypotension, unspecified        Immunizations:     influenza, injectable, quadrivalent, preservative free (FLUZONE QUAD 8743-1322) 9/15/2020    Tdap (ADACEL TDAP) 2021    zoster recombinant 2020    zoster recombinant 2020    Havrix -adult dose (HepA) 2018    Havrix -adult dose (HepA) 2019    Fluzone Quadrivalent (3+ years) 2018    Fluzone Quadrivalent (3+ years) 10/11/2019    PNEUMOVAX 23 (Pneumococcal PPV23) 2018    SARS-COV-2 (COVID-19) vaccine, UNSPECIFIED 3/25/2021    SARS-COV-2 (COVID-19) vaccine, UNSPECIFIED 3/4/2021        Allergies:     " Last Reviewed on 4/21/2021 10:01 AM by Stacey Carson    No Known Allergies.        Current Medications:     Last Reviewed on 4/21/2021 10:01 AM by Stacey Carson    losartan 25 mg oral tablet [Take 1/2 tab BID]    loratadine 10 mg oral tablet [1 tab daily]    HYDROcodone-acetaminophen 7.5-325 mg oral tablet [1 BID PRN]    amitriptyline 75mg Tablet [1 time daily]    Miralax 17 gram/dose oral Powder [1 capful (17g) daily, mixed in 4-8oz of liquid daily]    Florajen3 460 mg (7.5-6- 1.5 bill. cell) oral capsule [1 capsule daily]    ELIQUIS      TAB 2.5MG  [BID]    Flomax 0.4 mg oral capsule [take 1 capsule (0.4 mg) by oral route once daily 1/2 hour following the same meal each day]        Objective:        Vitals:         Historical:     9/15/2020  Wt:   237.4lbs    Current: 4/21/2021 10:02:19 AM    Ht:  6 ft, 4 in;  Wt: 219.6 lbs;  BMI: 26.7T: 97.3 F (temporal);  BP: 110/66 mm Hg (left arm, sitting);  P: 64 bpm (left arm (BP Cuff), sitting);  sCr: 1.07 mg/dL;  GFR: 78.21        Exams:     PHYSICAL EXAM:     GENERAL: Vitals recorded well developed, well nourished;  no apparent distress;     RESPIRATORY: normal appearance and symmetric expansion of chest wall; normal respiratory rate and pattern with no distress; normal breath sounds with no rales, rhonchi, wheezes or rubs;     CARDIOVASCULAR: normal rate; rhythm is regular;  no edema;     MUSCULOSKELETAL: normal gait; normal range of motion of all major muscle groups; no limb or joint pain with range of motion;     NEUROLOGIC: mental status: alert and oriented x 3;     PSYCHIATRIC: appropriate affect and demeanor; normal speech pattern; normal thought and perception;         Assessment:         I10   Essential (primary) hypertension       N40.1   Benign prostatic hyperplasia with lower urinary tract symptoms       I95.9   Hypotension, unspecified       R42   Dizziness and giddiness           ORDERS:         Meds Prescribed:       [Refilled] Flomax 0.4 mg oral capsule  [take 1 capsule (0.4 mg) by oral route once daily 1/2 hour following the same meal each day], #90 (ninety) capsules, Refills: 1 (one)                 Plan:         Essential (primary) hypertensionHe will continue to follow up with Dr. Medrano.  I have encouraged him to continue checking BP and bring BP log.  He will ensure proper hydration.  He will call and let us know what Dr. Medrano decides         Benign prostatic hyperplasia with lower urinary tract symptomscontinue flomax for now  may need to discontinue for longer period of time to see if this is causing his hypotension / dizziness          Prescriptions:       [Refilled] Flomax 0.4 mg oral capsule [take 1 capsule (0.4 mg) by oral route once daily 1/2 hour following the same meal each day], #90 (ninety) capsules, Refills: 1 (one)         Hypotension, unspecifiedConsider Flomax as the cause, however, he did report this did not improve with cessation, although he may not have stopped long enough.  I have encouraged Cyril to follow up if Dr. Medrano did not feel this was from his BP problems and we will need to work this up        Dizziness and giddinessas noted above.,  continue to seek cause             Charge Capture:         Primary Diagnosis:     I10  Essential (primary) hypertension           Orders:      51725  Office/outpatient visit; established patient, level 4  (In-House)              N40.1  Benign prostatic hyperplasia with lower urinary tract symptoms     I95.9  Hypotension, unspecified     R42  Dizziness and giddiness

## 2021-06-29 RX ORDER — APIXABAN 2.5 MG/1
TABLET, FILM COATED ORAL
Qty: 180 TABLET | Refills: 2 | Status: SHIPPED | OUTPATIENT
Start: 2021-06-29 | End: 2022-03-25

## 2021-07-01 VITALS
BODY MASS INDEX: 37.53 KG/M2 | WEIGHT: 308.2 LBS | SYSTOLIC BLOOD PRESSURE: 114 MMHG | HEART RATE: 69 BPM | TEMPERATURE: 97.4 F | DIASTOLIC BLOOD PRESSURE: 71 MMHG | HEIGHT: 76 IN

## 2021-07-01 VITALS
WEIGHT: 312.6 LBS | DIASTOLIC BLOOD PRESSURE: 64 MMHG | HEART RATE: 72 BPM | SYSTOLIC BLOOD PRESSURE: 122 MMHG | BODY MASS INDEX: 38.07 KG/M2 | TEMPERATURE: 97.4 F | HEIGHT: 76 IN

## 2021-07-01 VITALS
BODY MASS INDEX: 38.36 KG/M2 | SYSTOLIC BLOOD PRESSURE: 120 MMHG | WEIGHT: 315 LBS | TEMPERATURE: 97.4 F | DIASTOLIC BLOOD PRESSURE: 48 MMHG | HEIGHT: 76 IN | HEART RATE: 41 BPM

## 2021-07-01 VITALS
HEART RATE: 63 BPM | DIASTOLIC BLOOD PRESSURE: 58 MMHG | SYSTOLIC BLOOD PRESSURE: 114 MMHG | BODY MASS INDEX: 38.24 KG/M2 | TEMPERATURE: 98 F | OXYGEN SATURATION: 97 % | HEIGHT: 76 IN | WEIGHT: 314 LBS

## 2021-07-01 VITALS
SYSTOLIC BLOOD PRESSURE: 122 MMHG | DIASTOLIC BLOOD PRESSURE: 71 MMHG | WEIGHT: 306 LBS | HEIGHT: 76 IN | BODY MASS INDEX: 37.26 KG/M2 | HEART RATE: 67 BPM | TEMPERATURE: 97.6 F

## 2021-07-02 VITALS
HEIGHT: 76 IN | SYSTOLIC BLOOD PRESSURE: 124 MMHG | HEART RATE: 67 BPM | BODY MASS INDEX: 38.36 KG/M2 | WEIGHT: 315 LBS | DIASTOLIC BLOOD PRESSURE: 68 MMHG | TEMPERATURE: 98 F

## 2021-07-02 VITALS
HEART RATE: 64 BPM | WEIGHT: 219.6 LBS | DIASTOLIC BLOOD PRESSURE: 66 MMHG | SYSTOLIC BLOOD PRESSURE: 110 MMHG | TEMPERATURE: 97.3 F | HEIGHT: 76 IN | BODY MASS INDEX: 26.74 KG/M2

## 2021-07-02 VITALS
TEMPERATURE: 96 F | HEART RATE: 59 BPM | BODY MASS INDEX: 28.91 KG/M2 | WEIGHT: 237.4 LBS | HEIGHT: 76 IN | DIASTOLIC BLOOD PRESSURE: 72 MMHG | SYSTOLIC BLOOD PRESSURE: 110 MMHG

## 2021-07-07 RX ORDER — LOSARTAN POTASSIUM 25 MG/1
12.5 TABLET ORAL
Qty: 90 TABLET | Refills: 2 | Status: SHIPPED | OUTPATIENT
Start: 2021-07-07 | End: 2022-01-05

## 2021-10-05 ENCOUNTER — CLINICAL SUPPORT (OUTPATIENT)
Dept: FAMILY MEDICINE CLINIC | Age: 66
End: 2021-10-05

## 2021-10-05 DIAGNOSIS — Z23 NEED FOR INFLUENZA VACCINATION: Primary | ICD-10-CM

## 2021-10-05 PROCEDURE — 90686 IIV4 VACC NO PRSV 0.5 ML IM: CPT | Performed by: FAMILY MEDICINE

## 2021-10-05 PROCEDURE — G0008 ADMIN INFLUENZA VIRUS VAC: HCPCS | Performed by: FAMILY MEDICINE

## 2021-12-06 RX ORDER — TAMSULOSIN HYDROCHLORIDE 0.4 MG/1
CAPSULE ORAL
Qty: 90 CAPSULE | Refills: 1 | Status: SHIPPED | OUTPATIENT
Start: 2021-12-06 | End: 2022-07-14 | Stop reason: SDUPTHER

## 2021-12-28 ENCOUNTER — LAB (OUTPATIENT)
Dept: LAB | Facility: HOSPITAL | Age: 66
End: 2021-12-28

## 2021-12-28 ENCOUNTER — TRANSCRIBE ORDERS (OUTPATIENT)
Dept: ADMINISTRATIVE | Facility: HOSPITAL | Age: 66
End: 2021-12-28

## 2021-12-28 DIAGNOSIS — Z79.899 ENCOUNTER FOR LONG-TERM (CURRENT) USE OF OTHER MEDICATIONS: ICD-10-CM

## 2021-12-28 DIAGNOSIS — Z79.01 LONG TERM (CURRENT) USE OF ANTICOAGULANTS: ICD-10-CM

## 2021-12-28 DIAGNOSIS — Z79.01 LONG TERM (CURRENT) USE OF ANTICOAGULANTS: Primary | ICD-10-CM

## 2021-12-28 LAB
ALBUMIN SERPL-MCNC: 4.4 G/DL (ref 3.5–5.2)
ALBUMIN/GLOB SERPL: 1.8 G/DL
ALP SERPL-CCNC: 65 U/L (ref 39–117)
ALT SERPL W P-5'-P-CCNC: 14 U/L (ref 1–41)
ANION GAP SERPL CALCULATED.3IONS-SCNC: 4.2 MMOL/L (ref 5–15)
AST SERPL-CCNC: 21 U/L (ref 1–40)
BASOPHILS # BLD AUTO: 0 10*3/MM3 (ref 0–0.2)
BASOPHILS NFR BLD AUTO: 0 % (ref 0–1.5)
BILIRUB SERPL-MCNC: 0.6 MG/DL (ref 0–1.2)
BUN SERPL-MCNC: 15 MG/DL (ref 8–23)
BUN/CREAT SERPL: 11.7 (ref 7–25)
CALCIUM SPEC-SCNC: 9.7 MG/DL (ref 8.6–10.5)
CHLORIDE SERPL-SCNC: 106 MMOL/L (ref 98–107)
CO2 SERPL-SCNC: 30.8 MMOL/L (ref 22–29)
CREAT SERPL-MCNC: 1.28 MG/DL (ref 0.76–1.27)
DEPRECATED RDW RBC AUTO: 45.6 FL (ref 37–54)
EOSINOPHIL # BLD AUTO: 0.05 10*3/MM3 (ref 0–0.4)
EOSINOPHIL NFR BLD AUTO: 1.3 % (ref 0.3–6.2)
ERYTHROCYTE [DISTWIDTH] IN BLOOD BY AUTOMATED COUNT: 12.8 % (ref 12.3–15.4)
GFR SERPL CREATININE-BSD FRML MDRD: 56 ML/MIN/1.73
GLOBULIN UR ELPH-MCNC: 2.4 GM/DL
GLUCOSE SERPL-MCNC: 97 MG/DL (ref 65–99)
HCT VFR BLD AUTO: 42.6 % (ref 37.5–51)
HGB BLD-MCNC: 14.3 G/DL (ref 13–17.7)
IMM GRANULOCYTES # BLD AUTO: 0.01 10*3/MM3 (ref 0–0.05)
IMM GRANULOCYTES NFR BLD AUTO: 0.3 % (ref 0–0.5)
LYMPHOCYTES # BLD AUTO: 1.01 10*3/MM3 (ref 0.7–3.1)
LYMPHOCYTES NFR BLD AUTO: 26.6 % (ref 19.6–45.3)
MCH RBC QN AUTO: 32.2 PG (ref 26.6–33)
MCHC RBC AUTO-ENTMCNC: 33.6 G/DL (ref 31.5–35.7)
MCV RBC AUTO: 95.9 FL (ref 79–97)
MONOCYTES # BLD AUTO: 0.35 10*3/MM3 (ref 0.1–0.9)
MONOCYTES NFR BLD AUTO: 9.2 % (ref 5–12)
NEUTROPHILS NFR BLD AUTO: 2.38 10*3/MM3 (ref 1.7–7)
NEUTROPHILS NFR BLD AUTO: 62.6 % (ref 42.7–76)
PLATELET # BLD AUTO: 131 10*3/MM3 (ref 140–450)
PMV BLD AUTO: 9.8 FL (ref 6–12)
POTASSIUM SERPL-SCNC: 5.6 MMOL/L (ref 3.5–5.2)
PROT SERPL-MCNC: 6.8 G/DL (ref 6–8.5)
RBC # BLD AUTO: 4.44 10*6/MM3 (ref 4.14–5.8)
SODIUM SERPL-SCNC: 141 MMOL/L (ref 136–145)
WBC NRBC COR # BLD: 3.8 10*3/MM3 (ref 3.4–10.8)

## 2021-12-28 PROCEDURE — 80053 COMPREHEN METABOLIC PANEL: CPT

## 2021-12-28 PROCEDURE — 36415 COLL VENOUS BLD VENIPUNCTURE: CPT

## 2021-12-28 PROCEDURE — 85025 COMPLETE CBC W/AUTO DIFF WBC: CPT

## 2022-01-05 ENCOUNTER — OFFICE VISIT (OUTPATIENT)
Dept: CARDIOLOGY | Facility: CLINIC | Age: 67
End: 2022-01-05

## 2022-01-05 VITALS
SYSTOLIC BLOOD PRESSURE: 124 MMHG | DIASTOLIC BLOOD PRESSURE: 84 MMHG | HEIGHT: 76 IN | WEIGHT: 219 LBS | HEART RATE: 67 BPM | BODY MASS INDEX: 26.67 KG/M2

## 2022-01-05 DIAGNOSIS — E78.5 HYPERLIPIDEMIA, UNSPECIFIED HYPERLIPIDEMIA TYPE: ICD-10-CM

## 2022-01-05 DIAGNOSIS — I10 PRIMARY HYPERTENSION: Chronic | ICD-10-CM

## 2022-01-05 DIAGNOSIS — I50.22 CHRONIC SYSTOLIC CONGESTIVE HEART FAILURE: Primary | ICD-10-CM

## 2022-01-05 DIAGNOSIS — I82.509 CHRONIC DEEP VEIN THROMBOSIS (DVT) OF LOWER EXTREMITY, UNSPECIFIED LATERALITY, UNSPECIFIED VEIN: ICD-10-CM

## 2022-01-05 PROCEDURE — 99214 OFFICE O/P EST MOD 30 MIN: CPT | Performed by: INTERNAL MEDICINE

## 2022-01-05 RX ORDER — AMITRIPTYLINE HYDROCHLORIDE 75 MG/1
75 TABLET, FILM COATED ORAL DAILY
COMMUNITY

## 2022-01-05 RX ORDER — LOSARTAN POTASSIUM 25 MG/1
12.5 TABLET ORAL NIGHTLY
Qty: 45 TABLET | Refills: 3 | Status: SHIPPED | OUTPATIENT
Start: 2022-01-05 | End: 2022-12-14

## 2022-01-05 RX ORDER — HYDROCODONE BITARTRATE AND ACETAMINOPHEN 7.5; 325 MG/1; MG/1
7.5 TABLET ORAL EVERY 12 HOURS
COMMUNITY

## 2022-01-05 RX ORDER — LORATADINE 10 MG/1
10 TABLET ORAL DAILY
COMMUNITY
End: 2022-07-12

## 2022-01-05 NOTE — PROGRESS NOTES
Office Visit    Chief Complaint  Shortness of Breath, Dizziness (at night), and Hypertension    Subjective            Timothy R Spurling presents to [facility] CARDIOLOGY  Mr. Spurling is a 66 years old gentleman with cardiomyopathy chronic systolic heart failure hypertension who has lost almost 100 pounds in the last 18 months.  His blood pressure was running extremely low and hence his spironolactone and losartan had to be tapered off and stopped completely.  His episodes of dizziness infrequent now and his blood pressure log reveals his systolic pressures between 110 and 120s systolic.  He does get shortness of breath on moderate exertion.  He denies syncope      Past Medical History:   Diagnosis Date   • Chronic deep vein thrombosis (DVT) of lower extremity (HCC) 1/5/2022   • Hyperlipemia 1/5/2022   • Hypertension 1/5/2022       No Known Allergies     History reviewed. No pertinent surgical history.     Social History     Tobacco Use   • Smoking status: Never Smoker   • Smokeless tobacco: Never Used   Vaping Use   • Vaping Use: Never used   Substance Use Topics   • Alcohol use: Yes     Comment: occ   • Drug use: Never       History reviewed. No pertinent family history.     Prior to Admission medications    Medication Sig Start Date End Date Taking? Authorizing Provider   amitriptyline (ELAVIL) 75 MG tablet 75 mg Daily.   Yes Ray Hernandez MD   Eliquis 2.5 MG tablet tablet TAKE 1 TABLET BY MOUTH TWICE A DAY 6/29/21  Yes Janice Redding APRN   HYDROcodone-acetaminophen (NORCO) 7.5-325 MG per tablet 7.5 tablets Every 12 (Twelve) Hours.   Yes Ray Hernandez MD   loratadine (CLARITIN) 10 MG tablet 10 mg Daily.   Yes Ray Hernandez MD   Pediatric Multivitamins-Fl (MULTI VIT/FLUORIDE PO) Take  by mouth.   Yes Ray Hernandez MD   tamsulosin (FLOMAX) 0.4 MG capsule 24 hr capsule TAKE 1 CAPSULE (0.4 MG) BY ORAL ROUTE ONCE DAILY 1/2 HOUR FOLLOWING THE SAME MEAL EACH DAY  "12/6/21  Yes Eda Gu APRN   losartan (COZAAR) 25 MG tablet Take 0.5 tablets by mouth every night at bedtime. 7/7/21   Janice Redding June, APRSARAH        Review of Systems   Respiratory: Positive for shortness of breath. Negative for cough.    Cardiovascular: Negative for chest pain, palpitations and leg swelling.        Objective     /84   Pulse 67   Ht 193 cm (76\")   Wt 99.3 kg (219 lb)   BMI 26.66 kg/m²       Physical Exam  Constitutional:       General: He is awake.      Appearance: Normal appearance.   Neck:      Thyroid: No thyromegaly.      Vascular: No carotid bruit or JVD.   Cardiovascular:      Rate and Rhythm: Normal rate and regular rhythm.      Chest Wall: PMI is not displaced.      Pulses: Normal pulses.      Heart sounds: Normal heart sounds, S1 normal and S2 normal. No murmur heard.  No friction rub. No gallop. No S3 or S4 sounds.    Pulmonary:      Effort: Pulmonary effort is normal.      Breath sounds: Normal breath sounds and air entry. No wheezing, rhonchi or rales.   Abdominal:      General: Bowel sounds are normal.      Palpations: Abdomen is soft. There is no mass.      Tenderness: There is no abdominal tenderness.   Musculoskeletal:      Cervical back: Neck supple.      Right lower leg: No edema.      Left lower leg: No edema.   Neurological:      Mental Status: He is alert and oriented to person, place, and time.   Psychiatric:         Mood and Affect: Mood normal.         Behavior: Behavior is cooperative.           Result Review :                           Assessment and Plan        Diagnoses and all orders for this visit:    1. Chronic systolic congestive heart failure (HCC) (Primary)  Assessment & Plan:  His CHF is class II.  I am going to retry ARB therapy for his relatively asymptomatic chronic systolic heart failure.  I will start with 12.5 mg of losartan at bedtime.  He will do her 2-week blood pressure log 1 week from today and bring it to us.  He will continue to " exercise regularly and follow-up in 4 to 6 months.  We will repeat his echocardiogram in the next few weeks      2. Hyperlipidemia, unspecified hyperlipidemia type  -     Lipid Panel; Future  -     CBC & Differential; Future  -     Comprehensive Metabolic Panel; Future  -     T4, Free; Future  -     TSH; Future  -     Lipid Panel; Future  -     Comprehensive Metabolic Panel; Future  -     Magnesium; Future  -     Adult Transthoracic Echo Complete W/ Cont if Necessary Per Protocol; Future    3. Primary hypertension  -     Lipid Panel; Future  -     CBC & Differential; Future  -     Comprehensive Metabolic Panel; Future  -     T4, Free; Future  -     TSH; Future  -     Lipid Panel; Future  -     Comprehensive Metabolic Panel; Future  -     Magnesium; Future  -     Adult Transthoracic Echo Complete W/ Cont if Necessary Per Protocol; Future    4. Chronic deep vein thrombosis (DVT) of lower extremity, unspecified laterality, unspecified vein (HCC)  -     Lipid Panel; Future  -     CBC & Differential; Future  -     Comprehensive Metabolic Panel; Future  -     T4, Free; Future  -     TSH; Future  -     Lipid Panel; Future  -     Comprehensive Metabolic Panel; Future  -     Magnesium; Future  -     Adult Transthoracic Echo Complete W/ Cont if Necessary Per Protocol; Future    Other orders  -     losartan (Cozaar) 25 MG tablet; Take 0.5 tablets by mouth Every Night.  Dispense: 45 tablet; Refill: 3          Follow Up     Return in about 6 months (around 7/5/2022) for next ov with [facility]. echo next available.    Patient was given instructions and counseling regarding his condition or for health maintenance advice. Please see specific information pulled into the AVS if appropriate.     Ruba Medrano MD  01/05/22 10:29 EST

## 2022-01-06 ENCOUNTER — TELEPHONE (OUTPATIENT)
Dept: FAMILY MEDICINE CLINIC | Age: 67
End: 2022-01-06

## 2022-01-06 DIAGNOSIS — E78.5 HYPERLIPIDEMIA, UNSPECIFIED HYPERLIPIDEMIA TYPE: ICD-10-CM

## 2022-01-06 DIAGNOSIS — I82.509 CHRONIC DEEP VEIN THROMBOSIS (DVT) OF LOWER EXTREMITY, UNSPECIFIED LATERALITY, UNSPECIFIED VEIN: ICD-10-CM

## 2022-01-06 DIAGNOSIS — I10 PRIMARY HYPERTENSION: ICD-10-CM

## 2022-01-06 DIAGNOSIS — I50.22 CHRONIC SYSTOLIC CONGESTIVE HEART FAILURE: Primary | ICD-10-CM

## 2022-01-06 NOTE — TELEPHONE ENCOUNTER
Pt's insurance requires a referral from pcp not cardiology he is scheduled by caridology for a echo on sat can you place this referral

## 2022-01-11 NOTE — TELEPHONE ENCOUNTER
Order has been placed - Dr. Medrano's order still in system   Referrals, please let me know if there is anything else further I need to do

## 2022-01-20 ENCOUNTER — TELEPHONE (OUTPATIENT)
Dept: CARDIOLOGY | Facility: CLINIC | Age: 67
End: 2022-01-20

## 2022-01-20 NOTE — TELEPHONE ENCOUNTER
S/W patient regarding results of echo. Stated that he is tolerating the losartan without issues. Advised to keep f/u as scheduled.

## 2022-01-20 NOTE — TELEPHONE ENCOUNTER
----- Message from Ruba Medrano MD sent at 1/19/2022  5:53 PM EST -----  Let him know the results.  LV function is not too bad.  Similar to what it was in the past.  How is he tolerating his losartan?

## 2022-02-01 ENCOUNTER — LAB (OUTPATIENT)
Dept: LAB | Facility: HOSPITAL | Age: 67
End: 2022-02-01

## 2022-02-01 DIAGNOSIS — E78.5 HYPERLIPIDEMIA, UNSPECIFIED HYPERLIPIDEMIA TYPE: ICD-10-CM

## 2022-02-01 DIAGNOSIS — I82.509 CHRONIC DEEP VEIN THROMBOSIS (DVT) OF LOWER EXTREMITY, UNSPECIFIED LATERALITY, UNSPECIFIED VEIN: ICD-10-CM

## 2022-02-01 DIAGNOSIS — I10 PRIMARY HYPERTENSION: ICD-10-CM

## 2022-02-01 LAB
ALBUMIN SERPL-MCNC: 4.2 G/DL (ref 3.5–5.2)
ALBUMIN/GLOB SERPL: 2 G/DL
ALP SERPL-CCNC: 71 U/L (ref 39–117)
ALT SERPL W P-5'-P-CCNC: 17 U/L (ref 1–41)
ANION GAP SERPL CALCULATED.3IONS-SCNC: 9.3 MMOL/L (ref 5–15)
AST SERPL-CCNC: 19 U/L (ref 1–40)
BASOPHILS # BLD AUTO: 0.02 10*3/MM3 (ref 0–0.2)
BASOPHILS NFR BLD AUTO: 0.5 % (ref 0–1.5)
BILIRUB SERPL-MCNC: 0.8 MG/DL (ref 0–1.2)
BUN SERPL-MCNC: 13 MG/DL (ref 8–23)
BUN/CREAT SERPL: 11.6 (ref 7–25)
CALCIUM SPEC-SCNC: 9.5 MG/DL (ref 8.6–10.5)
CHLORIDE SERPL-SCNC: 105 MMOL/L (ref 98–107)
CHOLEST SERPL-MCNC: 146 MG/DL (ref 0–200)
CO2 SERPL-SCNC: 27.7 MMOL/L (ref 22–29)
CREAT SERPL-MCNC: 1.12 MG/DL (ref 0.76–1.27)
DEPRECATED RDW RBC AUTO: 44 FL (ref 37–54)
EOSINOPHIL # BLD AUTO: 0.04 10*3/MM3 (ref 0–0.4)
EOSINOPHIL NFR BLD AUTO: 1.1 % (ref 0.3–6.2)
ERYTHROCYTE [DISTWIDTH] IN BLOOD BY AUTOMATED COUNT: 12.3 % (ref 12.3–15.4)
GFR SERPL CREATININE-BSD FRML MDRD: 66 ML/MIN/1.73
GLOBULIN UR ELPH-MCNC: 2.1 GM/DL
GLUCOSE SERPL-MCNC: 110 MG/DL (ref 65–99)
HCT VFR BLD AUTO: 42.9 % (ref 37.5–51)
HDLC SERPL-MCNC: 60 MG/DL (ref 40–60)
HGB BLD-MCNC: 14.3 G/DL (ref 13–17.7)
IMM GRANULOCYTES # BLD AUTO: 0 10*3/MM3 (ref 0–0.05)
IMM GRANULOCYTES NFR BLD AUTO: 0 % (ref 0–0.5)
LDLC SERPL CALC-MCNC: 65 MG/DL (ref 0–100)
LDLC/HDLC SERPL: 1.03 {RATIO}
LYMPHOCYTES # BLD AUTO: 1.05 10*3/MM3 (ref 0.7–3.1)
LYMPHOCYTES NFR BLD AUTO: 27.8 % (ref 19.6–45.3)
MAGNESIUM SERPL-MCNC: 2.1 MG/DL (ref 1.6–2.4)
MCH RBC QN AUTO: 32.3 PG (ref 26.6–33)
MCHC RBC AUTO-ENTMCNC: 33.3 G/DL (ref 31.5–35.7)
MCV RBC AUTO: 96.8 FL (ref 79–97)
MONOCYTES # BLD AUTO: 0.3 10*3/MM3 (ref 0.1–0.9)
MONOCYTES NFR BLD AUTO: 7.9 % (ref 5–12)
NEUTROPHILS NFR BLD AUTO: 2.37 10*3/MM3 (ref 1.7–7)
NEUTROPHILS NFR BLD AUTO: 62.7 % (ref 42.7–76)
PLATELET # BLD AUTO: 128 10*3/MM3 (ref 140–450)
PMV BLD AUTO: 10.1 FL (ref 6–12)
POTASSIUM SERPL-SCNC: 4.4 MMOL/L (ref 3.5–5.2)
PROT SERPL-MCNC: 6.3 G/DL (ref 6–8.5)
RBC # BLD AUTO: 4.43 10*6/MM3 (ref 4.14–5.8)
SODIUM SERPL-SCNC: 142 MMOL/L (ref 136–145)
T4 FREE SERPL-MCNC: 1.14 NG/DL (ref 0.93–1.7)
TRIGL SERPL-MCNC: 122 MG/DL (ref 0–150)
TSH SERPL DL<=0.05 MIU/L-ACNC: 2.19 UIU/ML (ref 0.27–4.2)
VLDLC SERPL-MCNC: 21 MG/DL (ref 5–40)
WBC NRBC COR # BLD: 3.78 10*3/MM3 (ref 3.4–10.8)

## 2022-02-01 PROCEDURE — 80061 LIPID PANEL: CPT

## 2022-02-01 PROCEDURE — 84439 ASSAY OF FREE THYROXINE: CPT

## 2022-02-01 PROCEDURE — 84443 ASSAY THYROID STIM HORMONE: CPT

## 2022-02-01 PROCEDURE — 83735 ASSAY OF MAGNESIUM: CPT

## 2022-02-01 PROCEDURE — 36415 COLL VENOUS BLD VENIPUNCTURE: CPT

## 2022-02-01 PROCEDURE — 80053 COMPREHEN METABOLIC PANEL: CPT

## 2022-02-01 PROCEDURE — 85025 COMPLETE CBC W/AUTO DIFF WBC: CPT

## 2022-02-08 ENCOUNTER — TELEPHONE (OUTPATIENT)
Dept: CARDIOLOGY | Facility: CLINIC | Age: 67
End: 2022-02-08

## 2022-02-09 NOTE — TELEPHONE ENCOUNTER
Blood pressure is low at times.  Is he having any problems?  Can he tolerate these readings?  If so continue the losartan half a tab.

## 2022-02-22 ENCOUNTER — OFFICE VISIT (OUTPATIENT)
Dept: FAMILY MEDICINE CLINIC | Age: 67
End: 2022-02-22

## 2022-02-22 VITALS
SYSTOLIC BLOOD PRESSURE: 106 MMHG | BODY MASS INDEX: 26.33 KG/M2 | WEIGHT: 216.2 LBS | OXYGEN SATURATION: 97 % | HEART RATE: 72 BPM | HEIGHT: 76 IN | TEMPERATURE: 98 F | DIASTOLIC BLOOD PRESSURE: 69 MMHG

## 2022-02-22 DIAGNOSIS — Z23 ENCOUNTER FOR IMMUNIZATION: ICD-10-CM

## 2022-02-22 DIAGNOSIS — M19.90 ARTHRITIS: ICD-10-CM

## 2022-02-22 DIAGNOSIS — Z86.19 HISTORY OF HISTOPLASMOSIS: ICD-10-CM

## 2022-02-22 DIAGNOSIS — Z00.00 ROUTINE GENERAL MEDICAL EXAMINATION AT A HEALTH CARE FACILITY: Primary | ICD-10-CM

## 2022-02-22 DIAGNOSIS — I48.91 ATRIAL FIBRILLATION, UNSPECIFIED TYPE: ICD-10-CM

## 2022-02-22 DIAGNOSIS — Z12.11 SCREENING FOR COLON CANCER: ICD-10-CM

## 2022-02-22 DIAGNOSIS — I50.22 CHRONIC SYSTOLIC CONGESTIVE HEART FAILURE: ICD-10-CM

## 2022-02-22 DIAGNOSIS — Z13.6 SCREENING FOR CARDIOVASCULAR CONDITION: ICD-10-CM

## 2022-02-22 DIAGNOSIS — Z11.59 SCREENING FOR VIRAL DISEASE: ICD-10-CM

## 2022-02-22 DIAGNOSIS — I82.509 CHRONIC DEEP VEIN THROMBOSIS (DVT) OF LOWER EXTREMITY, UNSPECIFIED LATERALITY, UNSPECIFIED VEIN: Chronic | ICD-10-CM

## 2022-02-22 DIAGNOSIS — I10 PRIMARY HYPERTENSION: Chronic | ICD-10-CM

## 2022-02-22 DIAGNOSIS — M47.819 SPONDYLOSIS WITHOUT MYELOPATHY: ICD-10-CM

## 2022-02-22 DIAGNOSIS — E78.5 HYPERLIPIDEMIA, UNSPECIFIED HYPERLIPIDEMIA TYPE: Chronic | ICD-10-CM

## 2022-02-22 DIAGNOSIS — I42.9 CARDIOMYOPATHY, UNSPECIFIED TYPE: ICD-10-CM

## 2022-02-22 DIAGNOSIS — J30.2 SEASONAL ALLERGIC RHINITIS, UNSPECIFIED TRIGGER: ICD-10-CM

## 2022-02-22 PROBLEM — M19.91 PRIMARY LOCALIZED OSTEOARTHRITIS: Status: ACTIVE | Noted: 2018-12-17

## 2022-02-22 PROBLEM — M23.90 DERANGEMENT OF KNEE: Status: ACTIVE | Noted: 2018-12-14

## 2022-02-22 PROBLEM — D68.9 COAGULATION DISORDER (HCC): Status: ACTIVE | Noted: 2022-02-22

## 2022-02-22 PROBLEM — B39.9 HISTOPLASMOSIS: Status: ACTIVE | Noted: 2022-02-22

## 2022-02-22 PROBLEM — M51.379 DEGENERATION OF LUMBOSACRAL INTERVERTEBRAL DISC: Status: ACTIVE | Noted: 2017-03-01

## 2022-02-22 PROBLEM — M54.50 LUMBAR BACK PAIN: Status: ACTIVE | Noted: 2022-02-22

## 2022-02-22 PROBLEM — M51.37 DEGENERATION OF LUMBOSACRAL INTERVERTEBRAL DISC: Status: ACTIVE | Noted: 2017-03-01

## 2022-02-22 PROCEDURE — 1159F MED LIST DOCD IN RCRD: CPT | Performed by: NURSE PRACTITIONER

## 2022-02-22 PROCEDURE — 1170F FXNL STATUS ASSESSED: CPT | Performed by: NURSE PRACTITIONER

## 2022-02-22 PROCEDURE — G0009 ADMIN PNEUMOCOCCAL VACCINE: HCPCS | Performed by: NURSE PRACTITIONER

## 2022-02-22 PROCEDURE — G0438 PPPS, INITIAL VISIT: HCPCS | Performed by: NURSE PRACTITIONER

## 2022-02-22 PROCEDURE — 90670 PCV13 VACCINE IM: CPT | Performed by: NURSE PRACTITIONER

## 2022-02-22 NOTE — PROGRESS NOTES
The ABCs of the Annual Wellness Visit  Initial Medicare Wellness Visit    Chief Complaint   Patient presents with   • Medicare Wellness-subsequent   • GI Problem     referral for colonosopy     Subjective   History of Present Illness:  Timothy R Spurling is a 66 y.o. male who presents for an Initial Medicare Wellness Visit.    The following portions of the patient's history were reviewed and   updated as appropriate: allergies, current medications, past family history, past medical history, past social history, past surgical history and problem list.     Compared to one year ago, the patient feels his physical   health is better.    Compared to one year ago, the patient feels his mental   health is better.    Recent Hospitalizations:  He was not admitted to the hospital during the last year.       Current Medical Providers:  Patient Care Team:  Eda Gu APRN as PCP - General (Nurse Practitioner)  Ruba Medrano MD as Consulting Physician (Cardiology)  Marilee Farfan APRN as Nurse Practitioner (Pain Medicine)    Outpatient Medications Prior to Visit   Medication Sig Dispense Refill   • amitriptyline (ELAVIL) 75 MG tablet 75 mg Daily.     • Eliquis 2.5 MG tablet tablet TAKE 1 TABLET BY MOUTH TWICE A  tablet 2   • HYDROcodone-acetaminophen (NORCO) 7.5-325 MG per tablet 7.5 tablets Every 12 (Twelve) Hours.     • loratadine (CLARITIN) 10 MG tablet 10 mg Daily.     • losartan (Cozaar) 25 MG tablet Take 0.5 tablets by mouth Every Night. 45 tablet 3   • Pediatric Multivitamins-Fl (MULTI VIT/FLUORIDE PO) Take  by mouth.     • tamsulosin (FLOMAX) 0.4 MG capsule 24 hr capsule TAKE 1 CAPSULE (0.4 MG) BY ORAL ROUTE ONCE DAILY 1/2 HOUR FOLLOWING THE SAME MEAL EACH DAY 90 capsule 1     No facility-administered medications prior to visit.       Opioid medication/s are on active medication list.  and I have evaluated his active treatment plan and pain score trends (see table).  There were no vitals filed for this  "visit.  I have reviewed the chart for potential of high risk medication and harmful drug interactions in the elderly.            Aspirin is not on active medication list.  Aspirin use is contraindicated for this patient due to: current use of Eliquis.  .    Patient Active Problem List   Diagnosis   • Hyperlipemia   • Hypertension   • Chronic deep vein thrombosis (DVT) of lower extremity (HCC)   • Chronic systolic congestive heart failure (HCC)   • Atrial fibrillation (HCC)   • Cardiomyopathy (HCC)   • Coagulation disorder (HCC)   • Degeneration of lumbosacral intervertebral disc   • Derangement of knee   • History of histoplasmosis   • Arthritis   • Primary localized osteoarthritis   • Seasonal allergic rhinitis   • Spondylosis without myelopathy     Advance Care Planning  Advance Directive is not on file.  ACP discussion was held with the patient during this visit. Patient does not have an advance directive, information provided.    Review of Systems   Constitutional: Negative for fatigue.   HENT: Positive for hearing loss.    Eyes: Negative for visual disturbance.   Respiratory: Positive for shortness of breath (when dizziness).    Gastrointestinal: Positive for constipation. Negative for abdominal pain.   Genitourinary: Positive for frequency.        Nocturia     Musculoskeletal: Positive for back pain (chronic).   Neurological: Positive for dizziness (when standing up too fast).   Psychiatric/Behavioral: Negative for sleep disturbance. The patient is nervous/anxious.         Objective       Vitals:    02/22/22 0912   BP: 106/69   BP Location: Right arm   Patient Position: Sitting   Cuff Size: Adult   Pulse: 72   Temp: 98 °F (36.7 °C)   TempSrc: Oral   SpO2: 97%   Weight: 98.1 kg (216 lb 3.2 oz)   Height: 193 cm (76\")     BMI Readings from Last 1 Encounters:   02/22/22 26.32 kg/m²   BMI is above normal parameters. Recommendations include: nutrition counseling    Does the patient have evidence of cognitive " impairment? No    Physical Exam  Vitals reviewed.   Constitutional:       Appearance: Normal appearance.   HENT:      Head: Normocephalic.   Eyes:      Pupils: Pupils are equal, round, and reactive to light.   Cardiovascular:      Rate and Rhythm: Normal rate. Rhythm irregular.      Heart sounds: No murmur heard.      Pulmonary:      Effort: Pulmonary effort is normal.      Breath sounds: Normal breath sounds.   Musculoskeletal:         General: Normal range of motion.   Neurological:      Mental Status: He is alert.   Psychiatric:         Mood and Affect: Mood normal.         Behavior: Behavior normal.       Lab Results   Component Value Date    TRIG 122 02/01/2022    HDL 60 02/01/2022    LDL 65 02/01/2022    VLDL 21 02/01/2022          HEALTH RISK ASSESSMENT    Smoking Status:  Social History     Tobacco Use   Smoking Status Never Smoker   Smokeless Tobacco Never Used     Alcohol Consumption:  Social History     Substance and Sexual Activity   Alcohol Use Never     Fall Risk Screen:    KAYYADI Fall Risk Assessment was completed, and patient is at LOW risk for falls.Assessment completed on:2/22/2022    Depression Screen:   PHQ-2/PHQ-9 Depression Screening 2/22/2022   Little interest or pleasure in doing things 2   Feeling down, depressed, or hopeless 0   Trouble falling or staying asleep, or sleeping too much 0   Feeling tired or having little energy 3   Poor appetite or overeating 0   Feeling bad about yourself - or that you are a failure or have let yourself or your family down 0   Trouble concentrating on things, such as reading the newspaper or watching television 0   Moving or speaking so slowly that other people could have noticed. Or the opposite - being so fidgety or restless that you have been moving around a lot more than usual 0   Thoughts that you would be better off dead, or of hurting yourself in some way 0   Total Score 5   If you checked off any problems, how difficult have these problems made it for  you to do your work, take care of things at home, or get along with other people? Not difficult at all       Health Habits and Functional and Cognitive Screening:  Functional & Cognitive Status 2/22/2022   Do you have difficulty preparing food and eating? No   Do you have difficulty bathing yourself, getting dressed or grooming yourself? No   Do you have difficulty using the toilet? No   Do you have difficulty moving around from place to place? No   Do you have trouble with steps or getting out of a bed or a chair? No   Current Diet Unhealthy Diet   Dental Exam Up to date        Dental Exam Comment pt states they have dentures   Eye Exam Up to date   Exercise (times per week) 0 times per week   Current Exercises Include No Regular Exercise   Do you need help using the phone?  Yes   Are you deaf or do you have serious difficulty hearing?  Yes   Do you need help with transportation? No   Do you need help shopping? No   Do you need help preparing meals?  No   Do you need help with housework?  No   Do you need help with laundry? No   Do you need help taking your medications? No   Do you need help managing money? No   Do you ever drive or ride in a car without wearing a seat belt? No   Have you felt unusual stress, anger or loneliness in the last month? Yes   Who do you live with? Spouse   If you need help, do you have trouble finding someone available to you? No   Do you have difficulty concentrating, remembering or making decisions? No       Age-appropriate Screening Schedule:  Refer to the list below for future screening recommendations based on patient's age, sex and/or medical conditions. Orders for these recommended tests are listed in the plan section. The patient has been provided with a written plan.    Health Maintenance   Topic Date Due   • LIPID PANEL  02/01/2023   • TDAP/TD VACCINES (2 - Td or Tdap) 02/22/2027   • INFLUENZA VACCINE  Completed   • ZOSTER VACCINE  Completed            Assessment/Plan   CMS  Preventative Services Quick Reference  Risk Factors Identified During Encounter  Cardiovascular Disease  Chronic Pain   Hearing Problem  Immunizations Discussed/Encouraged (specific Immunizations; Tdap  The above risks/problems have been discussed with the patient.  Follow up actions/plans if indicated are seen below in the Assessment/Plan Section.  Pertinent information has been shared with the patient in the After Visit Summary.    Diagnoses and all orders for this visit:    1. Routine general medical examination at a health care facility (Primary)  Comments:  continue to maintain current weight recommend no further weight loss - screening recommended  vaccines discussed   annual wellness recommended     2. Atrial fibrillation, unspecified type (HCC)  Assessment & Plan:  Managed by Dr. Medrano Currently taking Eliquis / losartan as prescribed   Follow up with cardiology per recommendations       3. Chronic systolic congestive heart failure (HCC)  Assessment & Plan:  Managed and monitored by Dr. Medrano - follow up with Cardiology as recommended      4. Primary hypertension  Assessment & Plan:  Hypertension is unchanged.  Continue current treatment regimen.  Blood pressure will be reassessed at the next regular appointment.with cardiology as recommended       5. Arthritis  Assessment & Plan:  Continue current treatment with pain management and medication as recommended       6. Cardiomyopathy, unspecified type (HCC)    7. Spondylosis without myelopathy  Assessment & Plan:  Continue current treatment with pain management as recommended       8. Seasonal allergic rhinitis, unspecified trigger  Assessment & Plan:  Continue loratadine as currently recommended  Follow up PRN       9. Hyperlipidemia, unspecified hyperlipidemia type  Assessment & Plan:  Lipid abnormalities are unchanged.  Pharmacotherapy as ordered.  Lipids will be reassessed with Dr. Medrano as recommended .      10. Chronic deep vein thrombosis (DVT) of lower  extremity, unspecified laterality, unspecified vein (HCC)  Assessment & Plan:  Continue current treatment per recommendations of cardiology         11. History of histoplasmosis  Assessment & Plan:  Stable  - no active problems or concerns   Follow up PRN       12. Screening for cardiovascular condition    13. Encounter for immunization  -     Pneumococcal Conjugate Vaccine 13-Valent All    14. Screening for viral disease    15. Screening for colon cancer  -     Ambulatory Referral to Gastroenterology      Follow Up:  Return in about 1 year (around 2/22/2023) for Medicare Wellness, with cardiology as recommended .                             - management of hemodynamics as above, overall stable  - cardiorenal recs appreciated

## 2022-02-26 PROBLEM — Z86.19 HISTORY OF HISTOPLASMOSIS: Status: ACTIVE | Noted: 2022-02-22

## 2022-02-26 PROBLEM — M54.50 LUMBAR BACK PAIN: Status: RESOLVED | Noted: 2022-02-22 | Resolved: 2022-02-26

## 2022-02-26 NOTE — ASSESSMENT & PLAN NOTE
Lipid abnormalities are unchanged.  Pharmacotherapy as ordered.  Lipids will be reassessed with Dr. Medrano as recommended .

## 2022-02-26 NOTE — ASSESSMENT & PLAN NOTE
Managed by Dr. Medrano Currently taking Eliquis / losartan as prescribed   Follow up with cardiology per recommendations

## 2022-02-26 NOTE — ASSESSMENT & PLAN NOTE
Hypertension is unchanged.  Continue current treatment regimen.  Blood pressure will be reassessed at the next regular appointment.with cardiology as recommended

## 2022-03-21 ENCOUNTER — CLINICAL SUPPORT (OUTPATIENT)
Dept: GASTROENTEROLOGY | Facility: CLINIC | Age: 67
End: 2022-03-21

## 2022-03-21 ENCOUNTER — PREP FOR SURGERY (OUTPATIENT)
Dept: OTHER | Facility: HOSPITAL | Age: 67
End: 2022-03-21

## 2022-03-21 ENCOUNTER — TELEPHONE (OUTPATIENT)
Dept: GASTROENTEROLOGY | Facility: CLINIC | Age: 67
End: 2022-03-21

## 2022-03-21 DIAGNOSIS — Z12.11 ENCOUNTER FOR SCREENING FOR MALIGNANT NEOPLASM OF COLON: Primary | ICD-10-CM

## 2022-03-21 NOTE — TELEPHONE ENCOUNTER
Timothy R Spurling  REASON FOR CALL Colon Screening  SENT IN PREP [facility]   Past Medical History:   Diagnosis Date   • Chronic deep vein thrombosis (DVT) of lower extremity (HCC) 1/5/2022   • Hyperlipemia 1/5/2022   • Hypertension 1/5/2022     No Known Allergies  Past Surgical History:   Procedure Laterality Date   • COLONOSCOPY       Social History     Socioeconomic History   • Marital status:    Tobacco Use   • Smoking status: Never Smoker   • Smokeless tobacco: Never Used   Vaping Use   • Vaping Use: Never used   Substance and Sexual Activity   • Alcohol use: Never   • Drug use: Never   • Sexual activity: Defer     Family History   Problem Relation Age of Onset   • Emphysema Mother    • Arthritis Mother    • Clotting disorder Father    • Lung cancer Father    • Stroke Father    • Lung cancer Brother    • Colon cancer Neg Hx        Current Outpatient Medications:   •  amitriptyline (ELAVIL) 75 MG tablet, 75 mg Daily., Disp: , Rfl:   •  Eliquis 2.5 MG tablet tablet, TAKE 1 TABLET BY MOUTH TWICE A DAY, Disp: 180 tablet, Rfl: 2  •  HYDROcodone-acetaminophen (NORCO) 7.5-325 MG per tablet, 7.5 tablets Every 12 (Twelve) Hours., Disp: , Rfl:   •  loratadine (CLARITIN) 10 MG tablet, 10 mg Daily., Disp: , Rfl:   •  losartan (Cozaar) 25 MG tablet, Take 0.5 tablets by mouth Every Night., Disp: 45 tablet, Rfl: 3  •  Pediatric Multivitamins-Fl (MULTI VIT/FLUORIDE PO), Take  by mouth., Disp: , Rfl:   •  tamsulosin (FLOMAX) 0.4 MG capsule 24 hr capsule, TAKE 1 CAPSULE (0.4 MG) BY ORAL ROUTE ONCE DAILY 1/2 HOUR FOLLOWING THE SAME MEAL EACH DAY, Disp: 90 capsule, Rfl: 1

## 2022-03-23 PROBLEM — Z12.11 ENCOUNTER FOR SCREENING FOR MALIGNANT NEOPLASM OF COLON: Status: ACTIVE | Noted: 2022-03-23

## 2022-03-25 RX ORDER — APIXABAN 2.5 MG/1
TABLET, FILM COATED ORAL
Qty: 180 TABLET | Refills: 2 | Status: SHIPPED | OUTPATIENT
Start: 2022-03-25 | End: 2023-01-25 | Stop reason: SDUPTHER

## 2022-03-28 ENCOUNTER — TELEPHONE (OUTPATIENT)
Dept: CARDIOLOGY | Facility: CLINIC | Age: 67
End: 2022-03-28

## 2022-03-28 NOTE — TELEPHONE ENCOUNTER
Procedure: Colonoscopy and/or EGD    Med Directive: Eliquis    PMH: CHF; HLD; HTN; DVT (chronic) of lower extremity    Last Seen: 01/05/2022    Labs (02/01/22): cr 1.12

## 2022-03-29 ENCOUNTER — TELEPHONE (OUTPATIENT)
Dept: GASTROENTEROLOGY | Facility: CLINIC | Age: 67
End: 2022-03-29

## 2022-03-29 NOTE — TELEPHONE ENCOUNTER
I have spoken with the patient about stopping his Eliquis 2 days prior to his scope. The patient understands hiss last day to take the Eliquis is 5/24/22.

## 2022-05-17 DIAGNOSIS — M20.41 HAMMER TOES OF BOTH FEET: Primary | ICD-10-CM

## 2022-05-17 DIAGNOSIS — M20.42 HAMMER TOES OF BOTH FEET: Primary | ICD-10-CM

## 2022-05-26 ENCOUNTER — HOSPITAL ENCOUNTER (OUTPATIENT)
Facility: HOSPITAL | Age: 67
Setting detail: HOSPITAL OUTPATIENT SURGERY
Discharge: HOME | End: 2022-05-26
Attending: INTERNAL MEDICINE | Admitting: INTERNAL MEDICINE

## 2022-05-26 ENCOUNTER — ANESTHESIA (OUTPATIENT)
Dept: GASTROENTEROLOGY | Facility: HOSPITAL | Age: 67
End: 2022-05-26

## 2022-05-26 ENCOUNTER — ANESTHESIA EVENT (OUTPATIENT)
Dept: GASTROENTEROLOGY | Facility: HOSPITAL | Age: 67
End: 2022-05-26

## 2022-05-26 VITALS
SYSTOLIC BLOOD PRESSURE: 125 MMHG | HEART RATE: 44 BPM | TEMPERATURE: 98.3 F | BODY MASS INDEX: 26.3 KG/M2 | RESPIRATION RATE: 14 BRPM | OXYGEN SATURATION: 99 % | DIASTOLIC BLOOD PRESSURE: 64 MMHG | WEIGHT: 216.05 LBS

## 2022-05-26 DIAGNOSIS — Z12.11 ENCOUNTER FOR SCREENING FOR MALIGNANT NEOPLASM OF COLON: ICD-10-CM

## 2022-05-26 PROCEDURE — 25010000002 GLUCAGON (HUMAN RECOMBINANT) 1 MG RECONSTITUTED SOLUTION: Performed by: NURSE ANESTHETIST, CERTIFIED REGISTERED

## 2022-05-26 PROCEDURE — 25010000002 PROPOFOL 10 MG/ML EMULSION: Performed by: NURSE ANESTHETIST, CERTIFIED REGISTERED

## 2022-05-26 PROCEDURE — 88305 TISSUE EXAM BY PATHOLOGIST: CPT | Performed by: INTERNAL MEDICINE

## 2022-05-26 PROCEDURE — 45380 COLONOSCOPY AND BIOPSY: CPT | Performed by: INTERNAL MEDICINE

## 2022-05-26 RX ORDER — PROPOFOL 10 MG/ML
VIAL (ML) INTRAVENOUS AS NEEDED
Status: DISCONTINUED | OUTPATIENT
Start: 2022-05-26 | End: 2022-05-26 | Stop reason: SURG

## 2022-05-26 RX ORDER — SODIUM CHLORIDE, SODIUM LACTATE, POTASSIUM CHLORIDE, CALCIUM CHLORIDE 600; 310; 30; 20 MG/100ML; MG/100ML; MG/100ML; MG/100ML
INJECTION, SOLUTION INTRAVENOUS CONTINUOUS PRN
Status: DISCONTINUED | OUTPATIENT
Start: 2022-05-26 | End: 2022-05-26 | Stop reason: SURG

## 2022-05-26 RX ORDER — LIDOCAINE HYDROCHLORIDE 20 MG/ML
INJECTION, SOLUTION EPIDURAL; INFILTRATION; INTRACAUDAL; PERINEURAL AS NEEDED
Status: DISCONTINUED | OUTPATIENT
Start: 2022-05-26 | End: 2022-05-26 | Stop reason: SURG

## 2022-05-26 RX ORDER — GLYCOPYRROLATE 0.2 MG/ML
INJECTION INTRAMUSCULAR; INTRAVENOUS AS NEEDED
Status: DISCONTINUED | OUTPATIENT
Start: 2022-05-26 | End: 2022-05-26 | Stop reason: SURG

## 2022-05-26 RX ADMIN — GLUCAGON HYDROCHLORIDE 0.5 MG: KIT at 17:54

## 2022-05-26 RX ADMIN — PROPOFOL 80 MG: 10 INJECTION, EMULSION INTRAVENOUS at 17:46

## 2022-05-26 RX ADMIN — GLYCOPYRROLATE 0.2 MG: 0.2 INJECTION INTRAMUSCULAR; INTRAVENOUS at 17:46

## 2022-05-26 RX ADMIN — PROPOFOL 200 MCG/KG/MIN: 10 INJECTION, EMULSION INTRAVENOUS at 17:46

## 2022-05-26 RX ADMIN — SODIUM CHLORIDE, POTASSIUM CHLORIDE, SODIUM LACTATE AND CALCIUM CHLORIDE: 600; 310; 30; 20 INJECTION, SOLUTION INTRAVENOUS at 17:43

## 2022-05-26 RX ADMIN — LIDOCAINE HYDROCHLORIDE 50 MG: 20 INJECTION, SOLUTION EPIDURAL; INFILTRATION; INTRACAUDAL; PERINEURAL at 17:46

## 2022-05-26 NOTE — H&P
Pre Procedure History & Physical    Chief Complaint:   Screening    Subjective     HPI:   Colon cancer screening    Past Medical History:   Past Medical History:   Diagnosis Date   • Cardiomyopathy (HCC)    • Chronic deep vein thrombosis (DVT) of lower extremity (HCC) 01/05/2022   • Gastric ulcer    • Hyperlipemia 01/05/2022   • Hypertension 01/05/2022   • Pancreatitis        Past Surgical History:  Past Surgical History:   Procedure Laterality Date   • CHOLECYSTECTOMY     • COLONOSCOPY     • ERCP     • FRACTURE SURGERY     • JOINT REPLACEMENT     • STOMACH SURGERY         Family History:  Family History   Problem Relation Age of Onset   • Emphysema Mother    • Arthritis Mother    • Clotting disorder Father    • Lung cancer Father    • Stroke Father    • Lung cancer Brother    • Colon cancer Neg Hx        Social History:   reports that he has never smoked. He has never used smokeless tobacco. He reports that he does not drink alcohol and does not use drugs.    Medications:   Medications Prior to Admission   Medication Sig Dispense Refill Last Dose   • amitriptyline (ELAVIL) 75 MG tablet 75 mg Daily.   Past Week at Unknown time   • Eliquis 2.5 MG tablet tablet TAKE 1 TABLET BY MOUTH TWICE A  tablet 2 Past Week at Unknown time   • HYDROcodone-acetaminophen (NORCO) 7.5-325 MG per tablet 7.5 tablets Every 12 (Twelve) Hours.   5/25/2022 at Unknown time   • loratadine (CLARITIN) 10 MG tablet 10 mg Daily.   5/25/2022 at Unknown time   • losartan (Cozaar) 25 MG tablet Take 0.5 tablets by mouth Every Night. 45 tablet 3 Past Week at Unknown time   • Pediatric Multivitamins-Fl (MULTI VIT/FLUORIDE PO) Take  by mouth.   5/25/2022 at Unknown time   • polyethylene glycol (GoLYTELY) 236 g solution Please take as directed from provider instructions 4000 mL 0 5/26/2022 at 1000   • tamsulosin (FLOMAX) 0.4 MG capsule 24 hr capsule TAKE 1 CAPSULE (0.4 MG) BY ORAL ROUTE ONCE DAILY 1/2 HOUR FOLLOWING THE SAME MEAL EACH DAY 90  capsule 1 5/25/2022 at Unknown time       Allergies:  Patient has no known allergies.        Objective     Blood pressure 125/64, pulse (!) 44, temperature 97.3 °F (36.3 °C), temperature source Temporal, resp. rate 18, weight 98 kg (216 lb 0.8 oz), SpO2 99 %.    Physical Exam   Constitutional: Pt is oriented to person, place, and time and well-developed, well-nourished, and in no distress.   Mouth/Throat: Oropharynx is clear and moist.   Neck: Normal range of motion.   Cardiovascular: Normal rate, regular rhythm and normal heart sounds.    Pulmonary/Chest: Effort normal and breath sounds normal.   Abdominal: Soft. Nontender  Skin: Skin is warm and dry.   Psychiatric: Mood, memory, affect and judgment normal.     Assessment & Plan     Diagnosis:  Screening    Anticipated Surgical Procedure:  Colonoscopy    The risks, benefits, and alternatives of this procedure have been discussed with the patient or the responsible party- the patient understands and agrees to proceed.

## 2022-05-26 NOTE — ANESTHESIA PREPROCEDURE EVALUATION
Anesthesia Evaluation     Patient summary reviewed and Nursing notes reviewed   no history of anesthetic complications:  NPO Solid Status: > 8 hours  NPO Liquid Status: > 2 hours           Airway   Mallampati: I  TM distance: >3 FB  No difficulty expected  Dental    (+) edentulous    Pulmonary - negative pulmonary ROS and normal exam   Cardiovascular - normal exam  Exercise tolerance: good (4-7 METS)    ECG reviewed    (+) hypertension, dysrhythmias (LBBB) Atrial Fib, CHF , DVT, hyperlipidemia,     ROS comment: Echo:  Interpretation Summary    · Estimated left ventricular EF was in agreement with the calculated left ventricular EF. Left ventricular ejection fraction appears to be 46 - 50%. Left ventricular systolic function is mildly decreased.  · Left ventricular diastolic function is consistent with (grade II w/high LAP) pseudonormalization.  · There is mild mitral valve regurgitation present        Neuro/Psych- negative ROS  GI/Hepatic/Renal/Endo    (+)  PUD,      ROS Comment: Pancreatitis     Musculoskeletal     Abdominal  - normal exam   Substance History - negative use     OB/GYN negative ob/gyn ROS         Other   arthritis,      ROS/Med Hx Other:                             Anesthesia Plan    ASA 4     general   (Total IV Anesthesia    Patient understands anesthesia not responsible for dental damage.  )  intravenous induction     Anesthetic plan, all risks, benefits, and alternatives have been provided, discussed and informed consent has been obtained with: patient.    Plan discussed with CRNA.        CODE STATUS:

## 2022-05-26 NOTE — ANESTHESIA POSTPROCEDURE EVALUATION
Patient: Timothy R Spurling    Procedure Summary     Date: 05/26/22 Room / Location: [facility] ENDOSCOPY 1 / [facility] ENDOSCOPY    Anesthesia Start: 1743 Anesthesia Stop: 1816    Procedure: COLONOSCOPY WITH BIOPSY (N/A ) Diagnosis:       Encounter for screening for malignant neoplasm of colon      (Encounter for screening for malignant neoplasm of colon [Z12.11])    Surgeons: Teddy Gupta MD Provider: Jason Burger MD    Anesthesia Type: general ASA Status: 4          Anesthesia Type: general    Vitals  Vitals Value Taken Time   /57 05/26/22 1821   Temp     Pulse 62 05/26/22 1821   Resp     SpO2 99 % 05/26/22 1821   Vitals shown include unvalidated device data.        Post Anesthesia Care and Evaluation    Patient location during evaluation: bedside  Patient participation: complete - patient participated  Level of consciousness: awake  Pain score: 0  Pain management: adequate  Airway patency: patent  Anesthetic complications: No anesthetic complications  PONV Status: none  Cardiovascular status: acceptable and stable  Respiratory status: acceptable and room air  Hydration status: acceptable    Comments: An Anesthesiologist personally participated in the most demanding procedures (including induction and emergence if applicable) in the anesthesia plan, monitored the course of anesthesia administration at frequent intervals and remained physically present and available for immediate diagnosis and treatment of emergencies.

## 2022-05-31 LAB
CYTO UR: NORMAL
LAB AP CASE REPORT: NORMAL
LAB AP CLINICAL INFORMATION: NORMAL
PATH REPORT.FINAL DX SPEC: NORMAL
PATH REPORT.GROSS SPEC: NORMAL

## 2022-07-05 ENCOUNTER — LAB (OUTPATIENT)
Dept: LAB | Facility: HOSPITAL | Age: 67
End: 2022-07-05

## 2022-07-05 DIAGNOSIS — I82.509 CHRONIC DEEP VEIN THROMBOSIS (DVT) OF LOWER EXTREMITY, UNSPECIFIED LATERALITY, UNSPECIFIED VEIN: ICD-10-CM

## 2022-07-05 DIAGNOSIS — I10 PRIMARY HYPERTENSION: Chronic | ICD-10-CM

## 2022-07-05 DIAGNOSIS — E78.5 HYPERLIPIDEMIA, UNSPECIFIED HYPERLIPIDEMIA TYPE: ICD-10-CM

## 2022-07-05 LAB
ALBUMIN SERPL-MCNC: 4.5 G/DL (ref 3.5–5.2)
ALBUMIN/GLOB SERPL: 2 G/DL
ALP SERPL-CCNC: 71 U/L (ref 39–117)
ALT SERPL W P-5'-P-CCNC: 17 U/L (ref 1–41)
ANION GAP SERPL CALCULATED.3IONS-SCNC: 10.9 MMOL/L (ref 5–15)
AST SERPL-CCNC: 26 U/L (ref 1–40)
BILIRUB SERPL-MCNC: 0.7 MG/DL (ref 0–1.2)
BUN SERPL-MCNC: 17 MG/DL (ref 8–23)
BUN/CREAT SERPL: 16.8 (ref 7–25)
CALCIUM SPEC-SCNC: 9.5 MG/DL (ref 8.6–10.5)
CHLORIDE SERPL-SCNC: 102 MMOL/L (ref 98–107)
CHOLEST SERPL-MCNC: 153 MG/DL (ref 0–200)
CO2 SERPL-SCNC: 25.1 MMOL/L (ref 22–29)
CREAT SERPL-MCNC: 1.01 MG/DL (ref 0.76–1.27)
EGFRCR SERPLBLD CKD-EPI 2021: 82.5 ML/MIN/1.73
GLOBULIN UR ELPH-MCNC: 2.2 GM/DL
GLUCOSE SERPL-MCNC: 79 MG/DL (ref 65–99)
HDLC SERPL-MCNC: 74 MG/DL (ref 40–60)
LDLC SERPL CALC-MCNC: 64 MG/DL (ref 0–100)
LDLC/HDLC SERPL: 0.86 {RATIO}
POTASSIUM SERPL-SCNC: 4.7 MMOL/L (ref 3.5–5.2)
PROT SERPL-MCNC: 6.7 G/DL (ref 6–8.5)
SODIUM SERPL-SCNC: 138 MMOL/L (ref 136–145)
TRIGL SERPL-MCNC: 78 MG/DL (ref 0–150)
VLDLC SERPL-MCNC: 15 MG/DL (ref 5–40)

## 2022-07-05 PROCEDURE — 80053 COMPREHEN METABOLIC PANEL: CPT

## 2022-07-05 PROCEDURE — 36415 COLL VENOUS BLD VENIPUNCTURE: CPT

## 2022-07-05 PROCEDURE — 80061 LIPID PANEL: CPT

## 2022-07-07 NOTE — PROGRESS NOTES
Chief Complaint  Hyperlipidemia, Hypertension, Atrial Fibrillation, and Cardiomyopathy    Subjective        Timothy R Spurling presents to [facility] CARDIOLOGY  66-year-old male comes in to evaluate his congestive heart failure, hypertension and hyperlipidemia.  He has episodes where he gets short of breath with exertion and that will cause some palpitations described as heart beating fast for a few minutes.  The episodes do not last very long and that is unchanged for him.  Has a long-term history of blood sugars dropping at times and with that he will again have some palpitations with some dizziness.  He will usually eat for it to resolve. Denies chest pain,  swelling,  syncope, PND, and orthopnea.  Overall he says he is about the same.       Past History:    Past Medical History:   Diagnosis Date   • Cardiomyopathy (HCC)    • Chronic deep vein thrombosis (DVT) of lower extremity (HCC) 01/05/2022   • Gastric ulcer    • Hyperlipemia 01/05/2022   • Hypertension 01/05/2022   • Pancreatitis         Family History: family history includes Arthritis in his mother; Clotting disorder in his father; Emphysema in his mother; Lung cancer in his brother and father; Stroke in his father.     Social History: reports that he has never smoked. He has never used smokeless tobacco. He reports previous alcohol use of about 10.0 standard drinks of alcohol per week. He reports that he does not use drugs.    Allergies: Patient has no known allergies.    Past Surgical History:   Procedure Laterality Date   • CHOLECYSTECTOMY     • COLONOSCOPY     • COLONOSCOPY N/A 5/26/2022    Procedure: COLONOSCOPY WITH BIOPSY;  Surgeon: Teddy Gupta MD;  Location: [facility] ENDOSCOPY;  Service: Gastroenterology;  Laterality: N/A;  RECTAL EROSIONS   • ERCP     • FRACTURE SURGERY     • JOINT REPLACEMENT     • STOMACH SURGERY            Current Outpatient Medications:   •  amitriptyline (ELAVIL) 75 MG tablet, 75 mg Daily., Disp: ,  "Rfl:   •  Eliquis 2.5 MG tablet tablet, TAKE 1 TABLET BY MOUTH TWICE A DAY, Disp: 180 tablet, Rfl: 2  •  HYDROcodone-acetaminophen (NORCO) 7.5-325 MG per tablet, 7.5 tablets Every 12 (Twelve) Hours., Disp: , Rfl:   •  losartan (Cozaar) 25 MG tablet, Take 0.5 tablets by mouth Every Night., Disp: 45 tablet, Rfl: 3  •  multivitamin with minerals tablet tablet, Take 1 tablet by mouth Daily., Disp: , Rfl:   •  polyethylene glycol (GoLYTELY) 236 g solution, Please take as directed from provider instructions, Disp: 4000 mL, Rfl: 0  •  tamsulosin (FLOMAX) 0.4 MG capsule 24 hr capsule, Take 1 capsule by mouth Daily., Disp: 90 capsule, Rfl: 3     Medications Discontinued During This Encounter   Medication Reason   • Pediatric Multivitamins-Fl (MULTI VIT/FLUORIDE PO) Duplicate order   • loratadine (CLARITIN) 10 MG tablet *Therapy completed        Review of Systems   Constitutional: Negative for fatigue.   Respiratory: Positive for shortness of breath. Negative for cough.    Cardiovascular: Positive for palpitations. Negative for chest pain and leg swelling.   Neurological: Positive for light-headedness. Negative for dizziness.   All other systems reviewed and are negative.       Objective     Physical Exam  Constitutional:       General: He is not in acute distress.     Appearance: Normal appearance.   Neck:      Vascular: No carotid bruit.   Cardiovascular:      Rate and Rhythm: Normal rate and regular rhythm.      Heart sounds: Normal heart sounds.   Pulmonary:      Effort: Pulmonary effort is normal.      Breath sounds: Normal breath sounds.   Musculoskeletal:      Right lower leg: No edema.      Left lower leg: No edema.   Neurological:      Mental Status: He is alert.       /54   Pulse 61   Ht 193 cm (76\")   Wt 99.8 kg (220 lb)   BMI 26.78 kg/m²       Vitals:    07/12/22 0919   BP: 113/54   Pulse: 61       Result Review :         The following data was reviewed by: LINDA Vázquez on 07/12/2022:      Lab " Results   Component Value Date    BNP 47 03/23/2021    BNP 37 01/08/2020    BNP 61 05/17/2019     CMP    CMP 12/28/21 2/1/22 7/5/22   Glucose 97 110 (A) 79   BUN 15 13 17   Creatinine 1.28 (A) 1.12 1.01   eGFR Non African Am 56 (A) 66    Sodium 141 142 138   Potassium 5.6 (A) 4.4 4.7   Chloride 106 105 102   Calcium 9.7 9.5 9.5   Albumin 4.40 4.20 4.50   Total Bilirubin 0.6 0.8 0.7   Alkaline Phosphatase 65 71 71   AST (SGOT) 21 19 26   ALT (SGPT) 14 17 17   (A) Abnormal value            CBC w/diff    CBC w/Diff 12/28/21 2/1/22   WBC 3.80 3.78   RBC 4.44 4.43   Hemoglobin 14.3 14.3   Hematocrit 42.6 42.9   MCV 95.9 96.8   MCH 32.2 32.3   MCHC 33.6 33.3   RDW 12.8 12.3   Platelets 131 (A) 128 (A)   Neutrophil Rel % 62.6 62.7   Immature Granulocyte Rel % 0.3 0.0   Lymphocyte Rel % 26.6 27.8   Monocyte Rel % 9.2 7.9   Eosinophil Rel % 1.3 1.1   Basophil Rel % 0.0 0.5   (A) Abnormal value             Lipid Panel    Lipid Panel 2/1/22 7/5/22   Total Cholesterol 146 153   Triglycerides 122 78   HDL Cholesterol 60 74 (A)   VLDL Cholesterol 21 15   LDL Cholesterol  65 64   LDL/HDL Ratio 1.03 0.86   (A) Abnormal value             Lab Results   Component Value Date    TSH 2.190 02/01/2022    TSH 2.750 03/12/2020    TSH 2.620 01/22/2019      Lab Results   Component Value Date    FREET4 1.14 02/01/2022      Magnesium   Date Value Ref Range Status   02/01/2022 2.1 1.6 - 2.4 mg/dL Final        Last Echo  Results for orders placed in visit on 01/19/22    Adult Transthoracic Echo Complete W/ Cont if Necessary Per Protocol    Interpretation Summary  · Estimated left ventricular EF was in agreement with the calculated left ventricular EF. Left ventricular ejection fraction appears to be 46 - 50%. Left ventricular systolic function is mildly decreased.  · Left ventricular diastolic function is consistent with (grade II w/high LAP) pseudonormalization.  · There is mild mitral valve regurgitation present             Assessment and Plan     Diagnoses and all orders for this visit:    1. Dilated cardiomyopathy (HCC) (Primary)  Assessment & Plan:  With improving ejection fraction on last echo.  Without shortness of breath.  Continue current medications      2. Hyperlipidemia, unspecified hyperlipidemia type  Assessment & Plan:  Lipids at goal.  On lifestyle management alone.    Orders:  -     Lipid Panel; Future  -     Comprehensive Metabolic Panel; Future    3. Primary hypertension  Assessment & Plan:  Well-controlled.  Continue losartan 25 mg half a tab.      4. Chronic systolic congestive heart failure (HCC)  Assessment & Plan:  Shortness of breath stable.  Continue losartan 25 mg half a tab.      5. Chronic deep vein thrombosis (DVT) of lower extremity, unspecified laterality, unspecified vein (HCC)  Assessment & Plan:  Taking Eliquis 2.5 twice daily longterm for prevention of another DVT.    Orders:  -     CBC & Differential; Future          Follow Up     Return in about 6 months (around 1/12/2023) for with Peck, EKG on next visit.    Patient was given instructions and counseling regarding his condition or for health maintenance advice. Please see specific information pulled into the AVS if appropriate.       LINDA Frias  07/21/22 10:01 EDT

## 2022-07-12 ENCOUNTER — OFFICE VISIT (OUTPATIENT)
Dept: CARDIOLOGY | Facility: CLINIC | Age: 67
End: 2022-07-12

## 2022-07-12 VITALS
HEIGHT: 76 IN | WEIGHT: 220 LBS | DIASTOLIC BLOOD PRESSURE: 54 MMHG | BODY MASS INDEX: 26.79 KG/M2 | SYSTOLIC BLOOD PRESSURE: 113 MMHG | HEART RATE: 61 BPM

## 2022-07-12 DIAGNOSIS — I50.22 CHRONIC SYSTOLIC CONGESTIVE HEART FAILURE: ICD-10-CM

## 2022-07-12 DIAGNOSIS — I82.509 CHRONIC DEEP VEIN THROMBOSIS (DVT) OF LOWER EXTREMITY, UNSPECIFIED LATERALITY, UNSPECIFIED VEIN: Chronic | ICD-10-CM

## 2022-07-12 DIAGNOSIS — E78.5 HYPERLIPIDEMIA, UNSPECIFIED HYPERLIPIDEMIA TYPE: Chronic | ICD-10-CM

## 2022-07-12 DIAGNOSIS — I42.0 DILATED CARDIOMYOPATHY: Primary | ICD-10-CM

## 2022-07-12 DIAGNOSIS — I10 PRIMARY HYPERTENSION: Chronic | ICD-10-CM

## 2022-07-12 PROCEDURE — 99214 OFFICE O/P EST MOD 30 MIN: CPT | Performed by: NURSE PRACTITIONER

## 2022-07-12 RX ORDER — MULTIPLE VITAMINS W/ MINERALS TAB 9MG-400MCG
1 TAB ORAL DAILY
COMMUNITY

## 2022-07-12 NOTE — ASSESSMENT & PLAN NOTE
With improving ejection fraction on last echo.  Without shortness of breath.  Continue current medications

## 2022-07-17 RX ORDER — TAMSULOSIN HYDROCHLORIDE 0.4 MG/1
1 CAPSULE ORAL DAILY
Qty: 90 CAPSULE | Refills: 3 | Status: SHIPPED | OUTPATIENT
Start: 2022-07-17

## 2022-08-29 ENCOUNTER — HOSPITAL ENCOUNTER (OUTPATIENT)
Dept: GENERAL RADIOLOGY | Facility: HOSPITAL | Age: 67
Discharge: HOME | End: 2022-08-29
Admitting: NURSE PRACTITIONER

## 2022-08-29 ENCOUNTER — OFFICE VISIT (OUTPATIENT)
Dept: FAMILY MEDICINE CLINIC | Age: 67
End: 2022-08-29

## 2022-08-29 VITALS
HEART RATE: 58 BPM | SYSTOLIC BLOOD PRESSURE: 123 MMHG | WEIGHT: 217 LBS | BODY MASS INDEX: 26.42 KG/M2 | HEIGHT: 76 IN | OXYGEN SATURATION: 99 % | DIASTOLIC BLOOD PRESSURE: 70 MMHG

## 2022-08-29 DIAGNOSIS — K59.00 CONSTIPATION, UNSPECIFIED CONSTIPATION TYPE: ICD-10-CM

## 2022-08-29 DIAGNOSIS — K59.00 CONSTIPATION, UNSPECIFIED CONSTIPATION TYPE: Primary | ICD-10-CM

## 2022-08-29 PROCEDURE — 99214 OFFICE O/P EST MOD 30 MIN: CPT | Performed by: NURSE PRACTITIONER

## 2022-08-29 PROCEDURE — 74018 RADEX ABDOMEN 1 VIEW: CPT

## 2022-10-12 ENCOUNTER — CLINICAL SUPPORT (OUTPATIENT)
Dept: FAMILY MEDICINE CLINIC | Age: 67
End: 2022-10-12

## 2022-10-12 DIAGNOSIS — Z23 NEED FOR INFLUENZA VACCINATION: Primary | ICD-10-CM

## 2022-10-12 DIAGNOSIS — Z23 NEED FOR VACCINATION: ICD-10-CM

## 2022-10-12 PROCEDURE — 91312 COVID-19 (PFIZER) BIVALENT BOOSTER 12+YRS: CPT | Performed by: FAMILY MEDICINE

## 2022-10-12 PROCEDURE — 90662 IIV NO PRSV INCREASED AG IM: CPT | Performed by: FAMILY MEDICINE

## 2022-10-12 PROCEDURE — 0124A PR ADM SARSCOV2 30MCG/0.3ML BST: CPT | Performed by: FAMILY MEDICINE

## 2022-10-12 PROCEDURE — G0008 ADMIN INFLUENZA VIRUS VAC: HCPCS | Performed by: FAMILY MEDICINE

## 2022-12-14 RX ORDER — LOSARTAN POTASSIUM 25 MG/1
12.5 TABLET ORAL NIGHTLY
Qty: 45 TABLET | Refills: 1 | Status: SHIPPED | OUTPATIENT
Start: 2022-12-14 | End: 2023-01-25 | Stop reason: SDUPTHER

## 2023-01-10 ENCOUNTER — LAB (OUTPATIENT)
Dept: LAB | Facility: HOSPITAL | Age: 68
End: 2023-01-10
Payer: MEDICARE

## 2023-01-10 DIAGNOSIS — I82.509 CHRONIC DEEP VEIN THROMBOSIS (DVT) OF LOWER EXTREMITY, UNSPECIFIED LATERALITY, UNSPECIFIED VEIN: ICD-10-CM

## 2023-01-10 DIAGNOSIS — E78.5 HYPERLIPIDEMIA, UNSPECIFIED HYPERLIPIDEMIA TYPE: Chronic | ICD-10-CM

## 2023-01-10 LAB
ALBUMIN SERPL-MCNC: 4.3 G/DL (ref 3.5–5.2)
ALBUMIN/GLOB SERPL: 2 G/DL
ALP SERPL-CCNC: 68 U/L (ref 39–117)
ALT SERPL W P-5'-P-CCNC: 15 U/L (ref 1–41)
ANION GAP SERPL CALCULATED.3IONS-SCNC: 9.3 MMOL/L (ref 5–15)
AST SERPL-CCNC: 25 U/L (ref 1–40)
BASOPHILS # BLD AUTO: 0.01 10*3/MM3 (ref 0–0.2)
BASOPHILS NFR BLD AUTO: 0.3 % (ref 0–1.5)
BILIRUB SERPL-MCNC: 0.6 MG/DL (ref 0–1.2)
BUN SERPL-MCNC: 14 MG/DL (ref 8–23)
BUN/CREAT SERPL: 13 (ref 7–25)
CALCIUM SPEC-SCNC: 9.1 MG/DL (ref 8.6–10.5)
CHLORIDE SERPL-SCNC: 103 MMOL/L (ref 98–107)
CHOLEST SERPL-MCNC: 159 MG/DL (ref 0–200)
CO2 SERPL-SCNC: 28.7 MMOL/L (ref 22–29)
CREAT SERPL-MCNC: 1.08 MG/DL (ref 0.76–1.27)
DEPRECATED RDW RBC AUTO: 39.8 FL (ref 37–54)
EGFRCR SERPLBLD CKD-EPI 2021: 75.7 ML/MIN/1.73
EOSINOPHIL # BLD AUTO: 0.06 10*3/MM3 (ref 0–0.4)
EOSINOPHIL NFR BLD AUTO: 1.5 % (ref 0.3–6.2)
ERYTHROCYTE [DISTWIDTH] IN BLOOD BY AUTOMATED COUNT: 11.9 % (ref 12.3–15.4)
GLOBULIN UR ELPH-MCNC: 2.1 GM/DL
GLUCOSE SERPL-MCNC: 101 MG/DL (ref 65–99)
HCT VFR BLD AUTO: 40.9 % (ref 37.5–51)
HDLC SERPL-MCNC: 72 MG/DL (ref 40–60)
HGB BLD-MCNC: 14.4 G/DL (ref 13–17.7)
IMM GRANULOCYTES # BLD AUTO: 0 10*3/MM3 (ref 0–0.05)
IMM GRANULOCYTES NFR BLD AUTO: 0 % (ref 0–0.5)
LDLC SERPL CALC-MCNC: 74 MG/DL (ref 0–100)
LDLC/HDLC SERPL: 1.02 {RATIO}
LYMPHOCYTES # BLD AUTO: 1.06 10*3/MM3 (ref 0.7–3.1)
LYMPHOCYTES NFR BLD AUTO: 26.7 % (ref 19.6–45.3)
MCH RBC QN AUTO: 32.1 PG (ref 26.6–33)
MCHC RBC AUTO-ENTMCNC: 35.2 G/DL (ref 31.5–35.7)
MCV RBC AUTO: 91.1 FL (ref 79–97)
MONOCYTES # BLD AUTO: 0.28 10*3/MM3 (ref 0.1–0.9)
MONOCYTES NFR BLD AUTO: 7.1 % (ref 5–12)
NEUTROPHILS NFR BLD AUTO: 2.56 10*3/MM3 (ref 1.7–7)
NEUTROPHILS NFR BLD AUTO: 64.4 % (ref 42.7–76)
NRBC BLD AUTO-RTO: 0 /100 WBC (ref 0–0.2)
PLATELET # BLD AUTO: 140 10*3/MM3 (ref 140–450)
PMV BLD AUTO: 10.5 FL (ref 6–12)
POTASSIUM SERPL-SCNC: 4.3 MMOL/L (ref 3.5–5.2)
PROT SERPL-MCNC: 6.4 G/DL (ref 6–8.5)
RBC # BLD AUTO: 4.49 10*6/MM3 (ref 4.14–5.8)
SODIUM SERPL-SCNC: 141 MMOL/L (ref 136–145)
TRIGL SERPL-MCNC: 67 MG/DL (ref 0–150)
VLDLC SERPL-MCNC: 13 MG/DL (ref 5–40)
WBC NRBC COR # BLD: 3.97 10*3/MM3 (ref 3.4–10.8)

## 2023-01-10 PROCEDURE — 85025 COMPLETE CBC W/AUTO DIFF WBC: CPT

## 2023-01-10 PROCEDURE — 80053 COMPREHEN METABOLIC PANEL: CPT

## 2023-01-10 PROCEDURE — 80061 LIPID PANEL: CPT

## 2023-01-10 PROCEDURE — 36415 COLL VENOUS BLD VENIPUNCTURE: CPT

## 2023-01-25 ENCOUNTER — OFFICE VISIT (OUTPATIENT)
Dept: CARDIOLOGY | Facility: CLINIC | Age: 68
End: 2023-01-25
Payer: MEDICARE

## 2023-01-25 VITALS
WEIGHT: 225 LBS | SYSTOLIC BLOOD PRESSURE: 114 MMHG | BODY MASS INDEX: 27.4 KG/M2 | DIASTOLIC BLOOD PRESSURE: 66 MMHG | HEART RATE: 73 BPM | HEIGHT: 76 IN

## 2023-01-25 DIAGNOSIS — I95.1 ORTHOSTATIC HYPOTENSION: ICD-10-CM

## 2023-01-25 DIAGNOSIS — E78.2 MIXED HYPERLIPIDEMIA: ICD-10-CM

## 2023-01-25 DIAGNOSIS — Z86.718 HISTORY OF DEEP VEIN THROMBOSIS (DVT) OF LOWER EXTREMITY: ICD-10-CM

## 2023-01-25 DIAGNOSIS — I44.7 LBBB (LEFT BUNDLE BRANCH BLOCK): ICD-10-CM

## 2023-01-25 DIAGNOSIS — R06.09 EXERTIONAL DYSPNEA: ICD-10-CM

## 2023-01-25 DIAGNOSIS — I42.9 CARDIOMYOPATHY, UNSPECIFIED TYPE: Primary | ICD-10-CM

## 2023-01-25 PROCEDURE — 93000 ELECTROCARDIOGRAM COMPLETE: CPT | Performed by: INTERNAL MEDICINE

## 2023-01-25 PROCEDURE — 99214 OFFICE O/P EST MOD 30 MIN: CPT | Performed by: INTERNAL MEDICINE

## 2023-01-25 RX ORDER — LOSARTAN POTASSIUM 25 MG/1
12.5 TABLET ORAL NIGHTLY
Qty: 45 TABLET | Refills: 3 | Status: SHIPPED | OUTPATIENT
Start: 2023-01-25

## 2023-01-25 RX ORDER — MIDODRINE HYDROCHLORIDE 5 MG/1
5 TABLET ORAL
Qty: 270 TABLET | Refills: 3 | Status: SHIPPED | OUTPATIENT
Start: 2023-01-25

## 2023-02-02 ENCOUNTER — TELEPHONE (OUTPATIENT)
Dept: CARDIOLOGY | Facility: CLINIC | Age: 68
End: 2023-02-02
Payer: MEDICARE

## 2023-02-02 NOTE — TELEPHONE ENCOUNTER
We can also give him samples Eliquis when he comes in to fill out the PAP forms.  As he may need to clarify with the pharmacy with the cost of the midodrine without the Eliquis is?  I would not anticipate it to be nearly that high.  However if cost is a barrier, if he is comfortable using the [employer] patricia, he should be able to obtain midodrine for approximately $35 monthly.

## 2023-02-02 NOTE — TELEPHONE ENCOUNTER
Patient concerned with price of Midodrine says it is almost $250 and also Eliquis. Informed patient there is a payment assistant program for eliquis but not for midodrine. Patient asking if there is a possible alternative?     Patient is stopping by to sign Eliquis PAP forms.

## 2023-02-08 DIAGNOSIS — Z86.718 HISTORY OF DEEP VEIN THROMBOSIS (DVT) OF LOWER EXTREMITY: ICD-10-CM

## 2023-03-06 ENCOUNTER — OFFICE VISIT (OUTPATIENT)
Dept: FAMILY MEDICINE CLINIC | Age: 68
End: 2023-03-06
Payer: MEDICARE

## 2023-03-06 ENCOUNTER — LAB (OUTPATIENT)
Dept: LAB | Facility: HOSPITAL | Age: 68
End: 2023-03-06
Payer: MEDICARE

## 2023-03-06 VITALS
TEMPERATURE: 97.6 F | HEIGHT: 76 IN | DIASTOLIC BLOOD PRESSURE: 68 MMHG | HEART RATE: 61 BPM | OXYGEN SATURATION: 100 % | BODY MASS INDEX: 27.2 KG/M2 | SYSTOLIC BLOOD PRESSURE: 118 MMHG | WEIGHT: 223.4 LBS

## 2023-03-06 DIAGNOSIS — K59.00 CONSTIPATION, UNSPECIFIED CONSTIPATION TYPE: ICD-10-CM

## 2023-03-06 DIAGNOSIS — R35.0 URINARY FREQUENCY: ICD-10-CM

## 2023-03-06 DIAGNOSIS — Z13.6 SCREENING FOR CARDIOVASCULAR CONDITION: ICD-10-CM

## 2023-03-06 DIAGNOSIS — Z12.5 SCREENING FOR MALIGNANT NEOPLASM OF PROSTATE: ICD-10-CM

## 2023-03-06 DIAGNOSIS — Z11.59 SCREENING FOR VIRAL DISEASE: ICD-10-CM

## 2023-03-06 DIAGNOSIS — R06.02 SHORTNESS OF BREATH: ICD-10-CM

## 2023-03-06 DIAGNOSIS — Z00.00 MEDICARE ANNUAL WELLNESS VISIT, SUBSEQUENT: Primary | ICD-10-CM

## 2023-03-06 PROBLEM — G62.9 NEUROPATHY: Status: ACTIVE | Noted: 2023-03-06

## 2023-03-06 LAB
BACTERIA UR QL AUTO: NORMAL /HPF
BILIRUB UR QL STRIP: NEGATIVE
CLARITY UR: CLEAR
COLOR UR: YELLOW
GLUCOSE UR STRIP-MCNC: NEGATIVE MG/DL
HCV AB SER DONR QL: NORMAL
HGB UR QL STRIP.AUTO: NEGATIVE
KETONES UR QL STRIP: NEGATIVE
LEUKOCYTE ESTERASE UR QL STRIP.AUTO: NEGATIVE
NITRITE UR QL STRIP: NEGATIVE
PH UR STRIP.AUTO: 7 [PH] (ref 5–8)
PROT UR QL STRIP: NEGATIVE
PSA SERPL-MCNC: 1.01 NG/ML (ref 0–4)
RBC # UR STRIP: NORMAL /HPF
REF LAB TEST METHOD: NORMAL
SP GR UR STRIP: 1.01 (ref 1–1.03)
SQUAMOUS #/AREA URNS HPF: NORMAL /HPF
UROBILINOGEN UR QL STRIP: NORMAL
WBC # UR STRIP: NORMAL /HPF

## 2023-03-06 PROCEDURE — 1159F MED LIST DOCD IN RCRD: CPT | Performed by: NURSE PRACTITIONER

## 2023-03-06 PROCEDURE — G0439 PPPS, SUBSEQ VISIT: HCPCS | Performed by: NURSE PRACTITIONER

## 2023-03-06 PROCEDURE — 99213 OFFICE O/P EST LOW 20 MIN: CPT | Performed by: NURSE PRACTITIONER

## 2023-03-06 PROCEDURE — 81001 URINALYSIS AUTO W/SCOPE: CPT

## 2023-03-06 PROCEDURE — G0009 ADMIN PNEUMOCOCCAL VACCINE: HCPCS | Performed by: NURSE PRACTITIONER

## 2023-03-06 PROCEDURE — 90677 PCV20 VACCINE IM: CPT | Performed by: NURSE PRACTITIONER

## 2023-03-06 PROCEDURE — 36415 COLL VENOUS BLD VENIPUNCTURE: CPT

## 2023-03-06 PROCEDURE — G0103 PSA SCREENING: HCPCS

## 2023-03-06 PROCEDURE — 86803 HEPATITIS C AB TEST: CPT

## 2023-03-06 PROCEDURE — 1170F FXNL STATUS ASSESSED: CPT | Performed by: NURSE PRACTITIONER

## 2023-03-06 NOTE — PROGRESS NOTES
The ABCs of the Annual Wellness Visit  Subsequent Medicare Wellness Visit    Subjective    Timothy R Spurling is a 67 y.o. male who presents for a Subsequent Medicare Wellness Visit.    The following portions of the patient's history were reviewed and   updated as appropriate: allergies, current medications, past family history, past medical history, past social history, past surgical history and problem list.    Compared to one year ago, the patient feels his physical   health is the same.    Compared to one year ago, the patient feels his mental   health is the same.    Recent Hospitalizations:  He was not admitted to the hospital during the last year.       Current Medical Providers:  Patient Care Team:  Eda Gu APRN as PCP - General (Nurse Practitioner)  Marilee Farfan APRN as Nurse Practitioner (Pain Medicine)  Jason Peck MD as Consulting Physician (Interventional Cardiology)    Outpatient Medications Prior to Visit   Medication Sig Dispense Refill   • amitriptyline (ELAVIL) 75 MG tablet 1 tablet Daily.     • apixaban (Eliquis) 2.5 MG tablet tablet Take 1 tablet by mouth 2 (Two) Times a Day. 180 tablet 3   • HYDROcodone-acetaminophen (NORCO) 7.5-325 MG per tablet 7.5 tablets Every 12 (Twelve) Hours.     • losartan (COZAAR) 25 MG tablet Take 0.5 tablets by mouth Every Night. 45 tablet 3   • midodrine (PROAMATINE) 5 MG tablet Take 1 tablet by mouth 3 (Three) Times a Day Before Meals. 270 tablet 3   • multivitamin with minerals tablet tablet Take 1 tablet by mouth Daily.     • tamsulosin (FLOMAX) 0.4 MG capsule 24 hr capsule Take 1 capsule by mouth Daily. 90 capsule 3     No facility-administered medications prior to visit.       Opioid medication/s are on active medication list.  and I have evaluated his active treatment plan and pain score trends (see table).  There were no vitals filed for this visit.  I have reviewed the chart for potential of high risk medication and harmful drug interactions  "in the elderly.            Aspirin is not on active medication list.  Aspirin use is not indicated based on review of current medical condition/s. Risk of harm outweighs potential benefits.  .    Patient Active Problem List   Diagnosis   • Hyperlipemia   • Hypertension   • Chronic deep vein thrombosis (DVT) of lower extremity (HCC)   • Chronic systolic congestive heart failure (HCC)   • Atrial fibrillation (HCC)   • Cardiomyopathy (HCC)   • Coagulation disorder (HCC)   • Degeneration of lumbosacral intervertebral disc   • Derangement of knee   • History of histoplasmosis   • Arthritis   • Primary localized osteoarthritis   • Seasonal allergic rhinitis   • Spondylosis without myelopathy   • Encounter for screening for malignant neoplasm of colon   • Neuropathy   • Urinary frequency   • Shortness of breath   • Constipation     Advance Care Planning  Advance Directive is not on file.  ACP discussion was held with the patient during this visit. Patient does not have an advance directive, information provided.     Objective    Vitals:    03/06/23 1254   BP: 118/68   BP Location: Left arm   Patient Position: Sitting   Cuff Size: Adult   Pulse: 61   Temp: 97.6 °F (36.4 °C)   TempSrc: Oral   SpO2: 100%   Weight: 101 kg (223 lb 6.4 oz)   Height: 193 cm (76\")     Estimated body mass index is 27.19 kg/m² as calculated from the following:    Height as of this encounter: 193 cm (76\").    Weight as of this encounter: 101 kg (223 lb 6.4 oz).    BMI is >= 25 and <30. (Overweight) The following options were offered after discussion;: nutrition counseling/recommendations      Does the patient have evidence of cognitive impairment? No    Lab Results   Component Value Date    TRIG 67 01/10/2023    HDL 72 (H) 01/10/2023    LDL 74 01/10/2023    VLDL 13 01/10/2023        HEALTH RISK ASSESSMENT    Smoking Status:  Social History     Tobacco Use   Smoking Status Never   Smokeless Tobacco Never     Alcohol Consumption:  Social History "     Substance and Sexual Activity   Alcohol Use Not Currently    Comment: Beer on rare occasion     Fall Risk Screen:    SANDIE Fall Risk Assessment was completed, and patient is at LOW risk for falls.Assessment completed on:3/6/2023    Depression Screening:  PHQ-2/PHQ-9 Depression Screening 3/6/2023   Little Interest or Pleasure in Doing Things 0-->not at all   Feeling Down, Depressed or Hopeless 0-->not at all   PHQ-9: Brief Depression Severity Measure Score 0       Health Habits and Functional and Cognitive Screening:  Functional & Cognitive Status 3/6/2023   Do you have difficulty preparing food and eating? No   Do you have difficulty bathing yourself, getting dressed or grooming yourself? No   Do you have difficulty using the toilet? No   Do you have difficulty moving around from place to place? No   Do you have trouble with steps or getting out of a bed or a chair? No   Current Diet Well Balanced Diet   Dental Exam Not up to date        Dental Exam Comment -   Eye Exam Not up to date   Exercise (times per week) 5 times per week   Current Exercises Include Walking;Yard Work   Do you need help using the phone?  No   Are you deaf or do you have serious difficulty hearing?  Yes   Do you need help with transportation? No   Do you need help shopping? No   Do you need help preparing meals?  No   Do you need help with housework?  No   Do you need help with laundry? No   Do you need help taking your medications? No   Do you need help managing money? No   Do you ever drive or ride in a car without wearing a seat belt? No   Have you felt unusual stress, anger or loneliness in the last month? No   Who do you live with? Spouse   If you need help, do you have trouble finding someone available to you? No   Have you been bothered in the last four weeks by sexual problems? No   Do you have difficulty concentrating, remembering or making decisions? No       Age-appropriate Screening Schedule:  Refer to the list below for future  screening recommendations based on patient's age, sex and/or medical conditions. Orders for these recommended tests are listed in the plan section. The patient has been provided with a written plan.    Health Maintenance   Topic Date Due   • HEPATITIS C SCREENING  Never done   • LIPID PANEL  01/10/2024   • ANNUAL WELLNESS VISIT  03/06/2024   • TDAP/TD VACCINES (2 - Td or Tdap) 02/22/2027   • COLORECTAL CANCER SCREENING  05/26/2027   • COVID-19 Vaccine  Completed   • INFLUENZA VACCINE  Completed   • Pneumococcal Vaccine 65+  Completed   • ZOSTER VACCINE  Completed                CMS Preventative Services Quick Reference  Risk Factors Identified During Encounter  Chronic Pain: follow up with pain management   Hearing Problem: recent hearing aid purchased   Vision Screening Recommended  The above risks/problems have been discussed with the patient.  Pertinent information has been shared with the patient in the After Visit Summary.  An After Visit Summary and PPPS were made available to the patient.    Follow Up:   Next Medicare Wellness visit to be scheduled in 1 year.       Additional E&M Note during same encounter follows:  Patient has multiple medical problems which are significant and separately identifiable that require additional work above and beyond the Medicare Wellness Visit.      Chief Complaint  Annual Exam ([facility] /Pt states hes been having increased SOB, having electrocardiogram done next week )    Subjective        Dyspnea: Patient complains of shortness of breath when going from sitting to standing.  Symptoms include dyspnea when standing or stooping. Symptoms began several months ago, stable since that time  Recently was started on midodrine for orthostatic hypotension  Patient denies shortness of breath. Associated symptoms include constipation. Patient has not had recent travel.  Weight has been stable.  Appetite has been unaffected. Symptoms are exacerbated by position changes. Symptoms are alleviated  "by rest.  He is also currently scheduled for repeat echo tomorrow through cardiology.  He will follow-up with them regarding this symptom to rule out any cardiac causes.    Cyril also reports having continued problems with constipation.  He will sometimes go several days up to 5 or 6 and then have to take a Ex-Lax over-the-counter.  We did try him on Linzess at his last appointment however this was too expensive for him to continue so he is no longer refill that medication.  He reports that that did help.  He does report that he has also been having excess gas- belching and flatus.  He has been taking over-the-counter Gas-X or Beano prescriptions and this has not been significantly beneficial.  He reports that when he does have this excess gas that he will feel some shortness of breath or palpitations.  He does take hydrocodone as needed but reports that this is not every day that he takes this medication but does take several doses throughout the week.    Timothy R Spurling is also being seen today for dyspnea, constipation         Objective   Vital Signs:  /68 (BP Location: Left arm, Patient Position: Sitting, Cuff Size: Adult)   Pulse 61   Temp 97.6 °F (36.4 °C) (Oral)   Ht 193 cm (76\")   Wt 101 kg (223 lb 6.4 oz)   SpO2 100%   BMI 27.19 kg/m²     Physical Exam  Vitals reviewed.   Constitutional:       Appearance: Normal appearance.   HENT:      Head: Normocephalic.   Eyes:      Pupils: Pupils are equal, round, and reactive to light.   Cardiovascular:      Rate and Rhythm: Normal rate and regular rhythm.   Pulmonary:      Effort: Pulmonary effort is normal.      Breath sounds: Normal breath sounds. No decreased breath sounds, wheezing, rhonchi or rales.   Abdominal:      General: Bowel sounds are decreased.   Musculoskeletal:         General: Normal range of motion.   Neurological:      Mental Status: He is alert.   Psychiatric:         Mood and Affect: Mood normal.         Behavior: Behavior normal. "                         Assessment and Plan   Diagnoses and all orders for this visit:    1. Medicare annual wellness visit, subsequent (Primary)  Comments:  Well-balanced diet and exercise, annual wellness recommended, health maintenance recommendations updated.  He is currently up-to-date    2. Shortness of breath  Comments:  He will complete his cardiac evaluation to rule out any cardiac cause.  If no indication from cardiac standpoint follow-up for pulmonary assessment    3. Urinary frequency  Comments:  Suspect BPH continue tamsulosin we will check a urine and a PSA today follow-up if worsening  Orders:  -     Urinalysis With Microscopic - Urine, Clean Catch; Future    4. Constipation, unspecified constipation type  Comments:  I would recommend that he try MiraLAX twice daily for maintenance may reduce back to daily or every other day to maintain follow-up if worsening    5. Screening for malignant neoplasm of prostate  -     PSA SCREENING; Future    6. Screening for viral disease  -     Hepatitis C antibody; Future    7. Screening for cardiovascular condition    Other orders  -     Pneumococcal Conjugate Vaccine 20-Valent (PCV20)             Follow Up   Return for Follow-up after cardiac evaluation complete for further evaluation for dyspnea.  Patient was given instructions and counseling regarding his condition or for health maintenance advice. Please see specific information pulled into the AVS if appropriate.

## 2023-03-16 ENCOUNTER — HOSPITAL ENCOUNTER (OUTPATIENT)
Dept: CARDIOLOGY | Facility: HOSPITAL | Age: 68
Discharge: HOME | End: 2023-03-16
Admitting: INTERNAL MEDICINE
Payer: MEDICARE

## 2023-03-16 DIAGNOSIS — R06.09 EXERTIONAL DYSPNEA: ICD-10-CM

## 2023-03-16 DIAGNOSIS — I42.9 CARDIOMYOPATHY, UNSPECIFIED TYPE: ICD-10-CM

## 2023-03-16 PROCEDURE — 93306 TTE W/DOPPLER COMPLETE: CPT

## 2023-03-16 PROCEDURE — 93306 TTE W/DOPPLER COMPLETE: CPT | Performed by: INTERNAL MEDICINE

## 2023-03-17 LAB
BH CV ECHO MEAS - AO ROOT DIAM: 3.5 CM
BH CV ECHO MEAS - EDV(MOD-SP2): 161 ML
BH CV ECHO MEAS - EDV(MOD-SP4): 109 ML
BH CV ECHO MEAS - EF(MOD-BP): 48 %
BH CV ECHO MEAS - ESV(MOD-SP2): 57 ML
BH CV ECHO MEAS - ESV(MOD-SP4): 62 ML
BH CV ECHO MEAS - IVSD: 0.9 CM
BH CV ECHO MEAS - LA DIMENSION: 3.7 CM
BH CV ECHO MEAS - LVIDD: 5.4 CM
BH CV ECHO MEAS - LVIDS: 3.3 CM
BH CV ECHO MEAS - LVOT DIAM: 2.4 CM
BH CV ECHO MEAS - LVPWD: 0.9 CM
BH CV ECHO MEAS - MV A MAX VEL: 46 CM/SEC
BH CV ECHO MEAS - MV DEC TIME: 320 MSEC
BH CV ECHO MEAS - MV E MAX VEL: 50.6 CM/SEC
BH CV ECHO MEAS - MV E/A: 1.1
LEFT ATRIUM VOLUME INDEX: 19.5 ML/M2
MAXIMAL PREDICTED HEART RATE: 154 BPM
STRESS TARGET HR: 131 BPM

## 2023-05-02 ENCOUNTER — OFFICE VISIT (OUTPATIENT)
Dept: CARDIOLOGY | Facility: CLINIC | Age: 68
End: 2023-05-02
Payer: MEDICARE

## 2023-05-02 VITALS
WEIGHT: 226.4 LBS | SYSTOLIC BLOOD PRESSURE: 123 MMHG | HEART RATE: 62 BPM | HEIGHT: 76 IN | DIASTOLIC BLOOD PRESSURE: 68 MMHG | BODY MASS INDEX: 27.57 KG/M2

## 2023-05-02 DIAGNOSIS — I42.9 CARDIOMYOPATHY, UNSPECIFIED TYPE: Primary | ICD-10-CM

## 2023-05-02 DIAGNOSIS — I95.1 ORTHOSTATIC HYPOTENSION: ICD-10-CM

## 2023-05-02 DIAGNOSIS — I82.509 CHRONIC DEEP VEIN THROMBOSIS (DVT) OF LOWER EXTREMITY, UNSPECIFIED LATERALITY, UNSPECIFIED VEIN: Chronic | ICD-10-CM

## 2023-05-02 NOTE — PROGRESS NOTES
Chief Complaint  Cardiomyopathy, Congestive Heart Failure, Hyperlipidemia, Hypertension, and Follow-up    Subjective        History of Present Illness  Timothy R Spurling presents to [facility] CARDIOLOGY   Mr. Spurling is a 67-year-old male patient coming in today to follow-up.  He has a history of cardiomyopathy, hypertension, chronic DVT, and hyperlipidemia.  At visit in January he was noted to be having a lightheaded sensation, and found to have orthostatic hypotension.  He was started on midodrine, however he has since stopped taking this due side effect of headaches.  He did stop taking Flomax, and this significantly improved his symptoms.  He is also wearing compression socks during the day.  States on occasion he if he changes positions quickly he does have some mild lightheadedness, but overall it is much improved is tolerable.  He has no complaints of chest pains, orthopnea, PND, or lower extremity swelling.  He does have some chronic mild exertional dyspnea, but stable in nature.      Past Medical History:   Diagnosis Date   • Arthritis    • Cardiomyopathy    • CHF (congestive heart failure)    • Chronic deep vein thrombosis (DVT) of lower extremity 01/05/2022   • Gastric ulcer    • HL (hearing loss)    • Hyperlipemia 01/05/2022   • Hypertension 01/05/2022   • Pancreatitis        No Known Allergies     Past Surgical History:   Procedure Laterality Date   • CARDIAC CATHETERIZATION     • CHOLECYSTECTOMY     • COLONOSCOPY     • COLONOSCOPY N/A 05/26/2022    Procedure: COLONOSCOPY WITH BIOPSY;  Surgeon: Teddy Gupta MD;  Location: [facility] ENDOSCOPY;  Service: Gastroenterology;  Laterality: N/A;  RECTAL EROSIONS   • ERCP     • FRACTURE SURGERY     • JOINT REPLACEMENT     • STOMACH SURGERY          Social History  He  reports that he has never smoked. He has never used smokeless tobacco. He reports that he does not currently use alcohol. He reports that he does not use drugs.    Family  "History  His family history includes Arthritis in his mother; Clotting disorder in his father; Emphysema in his mother; Lung cancer in his brother and father; Stroke in his father.       Current Outpatient Medications on File Prior to Visit   Medication Sig   • amitriptyline (ELAVIL) 75 MG tablet 1 tablet Daily.   • apixaban (Eliquis) 2.5 MG tablet tablet Take 1 tablet by mouth 2 (Two) Times a Day.   • HYDROcodone-acetaminophen (NORCO) 7.5-325 MG per tablet 7.5 tablets Every 12 (Twelve) Hours.   • losartan (COZAAR) 25 MG tablet Take 0.5 tablets by mouth Every Night.   • multivitamin with minerals tablet tablet Take 1 tablet by mouth Daily.   • [DISCONTINUED] midodrine (PROAMATINE) 5 MG tablet Take 1 tablet by mouth 3 (Three) Times a Day Before Meals. (Patient not taking: Reported on 5/2/2023)   • [DISCONTINUED] tamsulosin (FLOMAX) 0.4 MG capsule 24 hr capsule Take 1 capsule by mouth Daily. (Patient not taking: Reported on 5/2/2023)     No current facility-administered medications on file prior to visit.         Review of Systems   Constitutional: Negative for fatigue.   Respiratory: Positive for shortness of breath. Negative for cough and chest tightness.    Cardiovascular: Negative for chest pain, palpitations and leg swelling.   Gastrointestinal: Negative for nausea and vomiting.   Neurological: Negative for dizziness and syncope.   Hematological: Does not bruise/bleed easily.        Objective   Vitals:    05/02/23 0753   BP: 123/68   Pulse: 62   Weight: 103 kg (226 lb 6.4 oz)   Height: 193 cm (76\")         Physical Exam  General : Alert, awake, no acute distress  Neck : Supple, no carotid bruit, no jugular venous distention  CVS : Regular rate and rhythm, no murmur, rubs or gallops  Lungs: Clear to auscultation bilaterally, no crackles or rhonchi  Abdomen: Soft, nontender, bowel sounds active  Extremities: Warm, well-perfused, no pedal edema      Result Review     The following data was reviewed by Leah Saldivar " Maurice, APRN  No results found for: PROBNP  CMP        7/5/2022    12:07 1/10/2023    09:36   CMP   Glucose 79   101     BUN 17   14     Creatinine 1.01   1.08     EGFR 82.5   75.7     Sodium 138   141     Potassium 4.7   4.3     Chloride 102   103     Calcium 9.5   9.1     Total Protein 6.7   6.4     Albumin 4.50   4.3     Globulin 2.2   2.1     Total Bilirubin 0.7   0.6     Alkaline Phosphatase 71   68     AST (SGOT) 26   25     ALT (SGPT) 17   15     Albumin/Globulin Ratio 2.0   2.0     BUN/Creatinine Ratio 16.8   13.0     Anion Gap 10.9   9.3       CBC w/diff        1/10/2023    09:36   CBC w/Diff   WBC 3.97     RBC 4.49     Hemoglobin 14.4     Hematocrit 40.9     MCV 91.1     MCH 32.1     MCHC 35.2     RDW 11.9     Platelets 140     Neutrophil Rel % 64.4     Immature Granulocyte Rel % 0.0     Lymphocyte Rel % 26.7     Monocyte Rel % 7.1     Eosinophil Rel % 1.5     Basophil Rel % 0.3        Lab Results   Component Value Date    TSH 2.190 02/01/2022      Lab Results   Component Value Date    FREET4 1.14 02/01/2022      No results found for: DDIMERQUANT  Magnesium   Date Value Ref Range Status   02/01/2022 2.1 1.6 - 2.4 mg/dL Final      No results found for: DIGOXIN   No results found for: TROPONINT        Lipid Panel        7/5/2022    12:07 1/10/2023    09:36   Lipid Panel   Total Cholesterol 153   159     Triglycerides 78   67     HDL Cholesterol 74   72     VLDL Cholesterol 15   13     LDL Cholesterol  64   74     LDL/HDL Ratio 0.86   1.02         Results for orders placed during the hospital encounter of 03/16/23    Adult Transthoracic Echo Complete w/ Color, Spectral and Contrast if necessary per protocol    Interpretation Summary  Mildly reduced left ventricular systolic function with an estimated ejection fraction of 45-50% and mild global hypokinesis.  Left ventricular diastolic function could not be fully assessed, as no tissue Doppler images were provided by the sonographer.  Mild mitral regurgitation,  but no hemodynamically significant valvular pathology.  Right ventricular systolic pressure could not be accurately estimated due to an insufficient TR velocity profile.  No evidence of pericardial effusion.             Assessment and Plan   Diagnoses and all orders for this visit:    1. Cardiomyopathy, unspecified type (Primary)  Assessment & Plan:  Most recent echocardiogram shows stable reduced EF of 45 to 50%.  No clinical signs of fluid overload, and without any shortness of breath.  Previous EKG showed chronic left bundle branch block.  Continue losartan 12.5 mg daily.      2. Chronic deep vein thrombosis (DVT) of lower extremity, unspecified laterality, unspecified vein  Assessment & Plan:  Continue anticoagulation with Eliquis 2.5 mg.  He did voice concerns related to cost, will arrange to have him apply for patient assistance program.      3. Orthostatic hypotension  Assessment & Plan:  Symptoms seem to have resolved with stopping Flomax.  For now we will go ahead and continue losartan due to his history of cardiomyopathy and reduced EF, however if symptoms worsen or persist, then we can consider discontinuation.  Recommend continuing wearing compression ox during the day, and changing positions slowly.              Follow Up   Return for 6 -9 months , with new MD d/t Dr Peck leaving.    Patient was given instructions and counseling regarding his condition or for health maintenance advice. Please see specific information pulled into the AVS if appropriate.     Signed,  Leah Quach, APRN  05/02/2023     Dictated Utilizing Dragon Dictation: Please note that portions of this note were completed with a voice recognition program.  Part of this note may be an electronic transcription/translation of spoken language to printed text using the Dragon Dictation System.

## 2023-05-02 NOTE — ASSESSMENT & PLAN NOTE
Symptoms seem to have resolved with stopping Flomax.  For now we will go ahead and continue losartan due to his history of cardiomyopathy and reduced EF, however if symptoms worsen or persist, then we can consider discontinuation.  Recommend continuing wearing compression ox during the day, and changing positions slowly.

## 2023-05-02 NOTE — ASSESSMENT & PLAN NOTE
Most recent echocardiogram shows stable reduced EF of 45 to 50%.  No clinical signs of fluid overload, and without any shortness of breath.  Previous EKG showed chronic left bundle branch block.  Continue losartan 12.5 mg daily.

## 2023-05-02 NOTE — ASSESSMENT & PLAN NOTE
Continue anticoagulation with Eliquis 2.5 mg.  He did voice concerns related to cost, will arrange to have him apply for patient assistance program.

## 2023-05-15 ENCOUNTER — TELEPHONE (OUTPATIENT)
Dept: CARDIOLOGY | Facility: CLINIC | Age: 68
End: 2023-05-15
Payer: MEDICARE

## 2023-05-17 NOTE — TELEPHONE ENCOUNTER
[facility] [employer] DENIED    DOCUMENTATION OF 3% OUT OF POCKET PRESCRIPTION EXPENSES, BASED ON HOUSEHOLD ADJUSTED GROSS INCOME, NOT MET.      PT STILL NEEDS $1018.07 TO MEET 3%    [facility] ADVISED TO ASK PT IF HE FILES TAXES OR HAS INCOME PROOF.  SAID THEY PULLED SOFT CREDIT CHECK AND IT SHOWED $49,500.00 YEARLY INCOME.      SW PT.  HE SAID HE DID NOT NEED TO FILE TAXES THIS YEAR.  STATED HE RECEIVES SOCIAL SECURITY AND PENSION MONTHLY AND WILL BRING COPIES TO ME AT THE OFFICE TO SUBMIT AGAIN TO [facility] [facility].

## 2023-05-17 NOTE — TELEPHONE ENCOUNTER
PT BROUGHT INCOME DOCUMENTATION TO THE OFFICE.  SENT COPIES BY FAX TO [facility] TO BE ADJUSTED.    WAITING ON DETERMINATION

## 2023-07-10 PROBLEM — R73.03 PREDIABETES: Status: ACTIVE | Noted: 2023-07-10

## 2023-07-25 LAB — GLUCOSE BLDC GLUCOMTR-MCNC: 127 MG/DL (ref 70–99)

## 2023-07-25 PROCEDURE — 99202 OFFICE O/P NEW SF 15 MIN: CPT

## 2023-07-25 PROCEDURE — 82948 REAGENT STRIP/BLOOD GLUCOSE: CPT

## 2023-07-26 ENCOUNTER — HOSPITAL ENCOUNTER (EMERGENCY)
Facility: HOSPITAL | Age: 68
Discharge: LEFT BEFORE DISCHARGE | End: 2023-07-26
Payer: MEDICARE

## 2023-07-26 VITALS
WEIGHT: 213.41 LBS | HEIGHT: 76 IN | SYSTOLIC BLOOD PRESSURE: 128 MMHG | OXYGEN SATURATION: 100 % | RESPIRATION RATE: 16 BRPM | BODY MASS INDEX: 25.99 KG/M2 | DIASTOLIC BLOOD PRESSURE: 65 MMHG | TEMPERATURE: 97.9 F | HEART RATE: 71 BPM

## 2023-07-26 LAB
ALBUMIN SERPL-MCNC: 4.4 G/DL (ref 3.5–5.2)
ALBUMIN/GLOB SERPL: 1.8 G/DL
ALP SERPL-CCNC: 72 U/L (ref 39–117)
ALT SERPL W P-5'-P-CCNC: 16 U/L (ref 1–41)
ANION GAP SERPL CALCULATED.3IONS-SCNC: 9.7 MMOL/L (ref 5–15)
AST SERPL-CCNC: 25 U/L (ref 1–40)
BASOPHILS # BLD AUTO: 0.01 10*3/MM3 (ref 0–0.2)
BASOPHILS NFR BLD AUTO: 0.1 % (ref 0–1.5)
BILIRUB SERPL-MCNC: 0.5 MG/DL (ref 0–1.2)
BILIRUB UR QL STRIP: NEGATIVE
BUN SERPL-MCNC: 21 MG/DL (ref 8–23)
BUN/CREAT SERPL: 17.2 (ref 7–25)
CALCIUM SPEC-SCNC: 9 MG/DL (ref 8.6–10.5)
CHLORIDE SERPL-SCNC: 104 MMOL/L (ref 98–107)
CLARITY UR: CLEAR
CO2 SERPL-SCNC: 26.3 MMOL/L (ref 22–29)
COLOR UR: YELLOW
CREAT SERPL-MCNC: 1.22 MG/DL (ref 0.76–1.27)
DEPRECATED RDW RBC AUTO: 43.6 FL (ref 37–54)
EGFRCR SERPLBLD CKD-EPI 2021: 65.4 ML/MIN/1.73
EOSINOPHIL # BLD AUTO: 0.02 10*3/MM3 (ref 0–0.4)
EOSINOPHIL NFR BLD AUTO: 0.3 % (ref 0.3–6.2)
ERYTHROCYTE [DISTWIDTH] IN BLOOD BY AUTOMATED COUNT: 12.5 % (ref 12.3–15.4)
GLOBULIN UR ELPH-MCNC: 2.5 GM/DL
GLUCOSE BLDC GLUCOMTR-MCNC: 120 MG/DL (ref 70–99)
GLUCOSE SERPL-MCNC: 104 MG/DL (ref 65–99)
GLUCOSE UR STRIP-MCNC: NEGATIVE MG/DL
HCT VFR BLD AUTO: 41 % (ref 37.5–51)
HGB BLD-MCNC: 14.1 G/DL (ref 13–17.7)
HGB UR QL STRIP.AUTO: NEGATIVE
IMM GRANULOCYTES # BLD AUTO: 0.03 10*3/MM3 (ref 0–0.05)
IMM GRANULOCYTES NFR BLD AUTO: 0.4 % (ref 0–0.5)
KETONES UR QL STRIP: NEGATIVE
LEUKOCYTE ESTERASE UR QL STRIP.AUTO: NEGATIVE
LYMPHOCYTES # BLD AUTO: 0.68 10*3/MM3 (ref 0.7–3.1)
LYMPHOCYTES NFR BLD AUTO: 9.4 % (ref 19.6–45.3)
MCH RBC QN AUTO: 32.7 PG (ref 26.6–33)
MCHC RBC AUTO-ENTMCNC: 34.4 G/DL (ref 31.5–35.7)
MCV RBC AUTO: 95.1 FL (ref 79–97)
MONOCYTES # BLD AUTO: 0.38 10*3/MM3 (ref 0.1–0.9)
MONOCYTES NFR BLD AUTO: 5.3 % (ref 5–12)
NEUTROPHILS NFR BLD AUTO: 6.1 10*3/MM3 (ref 1.7–7)
NEUTROPHILS NFR BLD AUTO: 84.5 % (ref 42.7–76)
NITRITE UR QL STRIP: NEGATIVE
NRBC BLD AUTO-RTO: 0 /100 WBC (ref 0–0.2)
PH UR STRIP.AUTO: 5.5 [PH] (ref 5–8)
PLATELET # BLD AUTO: 141 10*3/MM3 (ref 140–450)
PMV BLD AUTO: 10.2 FL (ref 6–12)
POTASSIUM SERPL-SCNC: 5 MMOL/L (ref 3.5–5.2)
PROT SERPL-MCNC: 6.9 G/DL (ref 6–8.5)
PROT UR QL STRIP: NEGATIVE
RBC # BLD AUTO: 4.31 10*6/MM3 (ref 4.14–5.8)
SODIUM SERPL-SCNC: 140 MMOL/L (ref 136–145)
SP GR UR STRIP: 1.01 (ref 1–1.03)
UROBILINOGEN UR QL STRIP: NORMAL
WBC NRBC COR # BLD: 7.22 10*3/MM3 (ref 3.4–10.8)
WHOLE BLOOD HOLD COAG: NORMAL

## 2023-07-26 PROCEDURE — 82948 REAGENT STRIP/BLOOD GLUCOSE: CPT

## 2023-07-26 PROCEDURE — 36415 COLL VENOUS BLD VENIPUNCTURE: CPT

## 2023-07-26 PROCEDURE — 80053 COMPREHEN METABOLIC PANEL: CPT

## 2023-07-26 PROCEDURE — 81003 URINALYSIS AUTO W/O SCOPE: CPT

## 2023-07-26 PROCEDURE — 85025 COMPLETE CBC W/AUTO DIFF WBC: CPT

## 2023-07-28 ENCOUNTER — HOSPITAL ENCOUNTER (OUTPATIENT)
Dept: CT IMAGING | Facility: HOSPITAL | Age: 68
Discharge: HOME | End: 2023-07-28
Admitting: INTERNAL MEDICINE
Payer: MEDICARE

## 2023-07-28 DIAGNOSIS — E16.2 HYPOGLYCEMIA: ICD-10-CM

## 2023-07-28 PROCEDURE — 25510000001 IOPAMIDOL PER 1 ML: Performed by: INTERNAL MEDICINE

## 2023-07-28 PROCEDURE — 74170 CT ABD WO CNTRST FLWD CNTRST: CPT

## 2023-07-28 RX ADMIN — IOPAMIDOL 100 ML: 755 INJECTION, SOLUTION INTRAVENOUS at 10:31

## 2023-08-07 ENCOUNTER — OFFICE VISIT (OUTPATIENT)
Dept: ENDOCRINOLOGY | Age: 68
End: 2023-08-07
Payer: MEDICARE

## 2023-08-07 VITALS
HEIGHT: 76 IN | WEIGHT: 215 LBS | BODY MASS INDEX: 26.18 KG/M2 | SYSTOLIC BLOOD PRESSURE: 100 MMHG | HEART RATE: 64 BPM | OXYGEN SATURATION: 98 % | TEMPERATURE: 97 F | DIASTOLIC BLOOD PRESSURE: 60 MMHG

## 2023-08-07 DIAGNOSIS — R73.03 PREDIABETES: ICD-10-CM

## 2023-08-07 DIAGNOSIS — E16.2 HYPOGLYCEMIA: Primary | ICD-10-CM

## 2023-08-07 PROCEDURE — 99214 OFFICE O/P EST MOD 30 MIN: CPT | Performed by: INTERNAL MEDICINE

## 2023-08-07 PROCEDURE — 3078F DIAST BP <80 MM HG: CPT | Performed by: INTERNAL MEDICINE

## 2023-08-07 PROCEDURE — 3074F SYST BP LT 130 MM HG: CPT | Performed by: INTERNAL MEDICINE

## 2023-08-07 RX ORDER — ACARBOSE 25 MG/1
25 TABLET ORAL
Qty: 90 TABLET | Refills: 5 | Status: SHIPPED | OUTPATIENT
Start: 2023-08-07 | End: 2024-08-06

## 2023-08-07 NOTE — PATIENT INSTRUCTIONS
"We still need to do the test where you eat food to drop your sugar and then get labs once the glucose is low  After the labs, fix sugars with peanut butter and glucose tabs. If you take only pure sugar like glucose tablets and sugar is causing you to DROP, you will yo-yo back down again.     Show the lab the orders that are in from 7/14/23 and ask them to please do these      AFTER you do the labs, start acarbose medication at lower dose: 25 mg with meals     See dietician about prediabetic diet and hypoglycemia diet.     Please schedule your follow up appointment for months    TEST RESULTS:  Endocrinology is a specialty that relies heavily on interpretation of labs and other testing while also considering your personal medical history. This is best done in conjunction with an office visit, ideally with labs drawn prior to the visit so that they may be discussed in person. Any tests completed more than 7 days after your visit will require a further follow up visit for review and interpretation. This visit may be arranged as a telemedicine visit, in person, or and e-visit, depending on clinician availability when results become available.?Test Performed at this medical center I will inform you of your test results-normal or otherwise. Please wait for a letter or a phone call. If for some reason you do not receive a letter of phone call about your results within 3 weeks after your test date, it is very important for you to contact our office.?Test Performed Outside of this facility: If you decide to have your tests performed outside this facility, it is your responsibility to contact us to confirm that the results have been received and reviewed by me.?    When you review your labs, there may be things that are noted slightly out of range that are not remarked upon by your physician. Please do not be alarmed! Many of these things are seen in some portion of a normal healthy population. Other \"abnormalities\" are within " "lab error range. Still others are \"calculations\" rather than actual lab values (example: BUN/Cr ratio) and the individual components in the calculation are just fine.?Labs done outside this Medical Center will not be routinely resulted in [employer] or visible for viewing there. Occasionally a few selected outside labs will be hand-entered into our computer system (for tracking purposes), in which case you will receive notification of a result. However, the entire battery of tests will still not be visible as outside labs are scanned into the EPIC system and do not appear in the lab results section. The same is true for any other tests or imaging studies. If you would like to view your results on [employer] please be sure to have your tests done at this facility?Because your labs are now being automatically \"released\" by a computer system, it is possible you may receive notification at unusual times of day. Do not be concerned- you are not being contacted at odd hours because there is an emergency! Your test results will continue to be reviewed by your physician and results requiring follow up action will lead to a phone call from our office notifying you of such a concern. Conversely, please do not call the office after hours to address your test results. Please be aware that if you are notified of test results or messages via [employer] it is your responsibility to log in to look at them. If no special intervention is needed after testing is done, you will not receive any additional contact from the office (i.e. Calls, letters, or messages). If I your results are normal, I am able to see that you have reviewed them and no further action is needed, you will not be called or contacted by the office.     APPOINTMENTS:  Once you schedule an appointment, the responsibility to keep it becomes yours. Please be sure to give 24 hours notice when calling the office to cancel an appointment, as less notice will also be treated as a " "\"no show.\" It is your responsibility to call to reschedule any missed or canceled appointments.? Excessive no-show visits will result in dismissal from the practice.    PRESCRIPTIONS:  Bring all of your medications to each visit and request the necessary refills at that time. Please allow 48 business hours for any refills requested outside of an office visit. Prescriptions will not be refilled after business hours or on weekends, so plan accordingly. Mail-order prescriptions can be electronically prescribed for you; if your mail-order pharmacy does not have this capability, you will need to mail in your written prescription.?    DIABETICS:  If you take medications to lower your glucoses, remember to always test your glucose prior to driving or operating any machinery.?Do not get pregnant without first normalizing your glucoses and discussing your plans with your physician.?Bring your glucose log book to all appointments at our office.    SCOPE OF MEDICAL CARE:  -Dr. Mcknight is caring for you as your ENDOCRINOLOGIST. She does not function as a primary care provider for her patients. All patients are expected to have an internal medicine or family medicine physician to provide non-endocrine care, which also includes urgent care, annual physicals, cancer screenings, and preoperative clearance.    **Information for MyChart Usage**?    Prescription refills:  For prescription refills, please do not send a message/request as a note to your doctor- it will actually delay the process! The best way to get a prescription refill is to ask your pharmacy to contact our office. They will then do so electronically. Please leave 48 business hours for all refills.?    Messages to the office/your physician:  We will attempt to answer messages within 2 business days. Please do not message your physician with new symptoms or concerns about possible medication side effects. It is always possible that your symptoms are more serious " than you even perceive and we would like to be certain they are addressed promptly! What other type of questions may not best for MyChart? If you have a complicated question that requires more than a 2 sentence response, or multiple questions, you may be asked to make a follow up appointment. Review of test results done more than 7 days after your office visit will require an additional follow up visit to address and respond to questions. MyChart isn't a substitute for your personalized office visits.?

## 2023-08-07 NOTE — PROGRESS NOTES
"Chief Complaint  Hypoglycemia      HPI:  Timothy R Spurling presents to [facility] ENDOCRINOLOGY for fu hypoglycemia.  Had gastric surgery 50 years ago for ulcers- thinks had partial gastrectomy, not aware of intestinal surgery.  After that had issues after meals with sweating, visual changes, numb lips, lethargic. Back then, didn't test glucoses- used to happen every 6 months.   Relieved with eating- peanut butter crackers, applesauce.   Has been happening more recently  Episodes almost daily or every other day  Wife has meter (she has DM)  Has lost over 100# over a year (deliberate) [id number]  Takes longer to recover from these episodes and few sx until \"it hits me\"  Wife usually drives    6-7 years ago, a couple episodes-wife had to call ambulance.         Hx pancreatitis in 2008- had stents, then removed    Plan last visit  -Attempt to replicate hypoglycemia, with labs. Pt will eat breakfast at home and come to lab about 8 am for labs: ACTH/cortisol, insulin, cpeptide, proinsulin, CMP, DHEA-S, Yrn-C; still needs to do  -Get Evelio 3- has now and really likes it and has been making some changes  -DM ctr for ed re: hypoglycemia diet- still needs  -acarbose. Tried 100 mg but too gassy, stopped.     CGMS reviewed from dates as noted below  Indication: hypoglycemia  Device type: Evelio 3  MN-6am: low once at MN-1 am after preceding SPIKE at 11 pm, no other nocturnal hypos  6am -noon: in range  Noon-6pm: a couple lows at 2 pm  6pm-MN: a few mild lows in eves as below. July 25 had lows/highs/lows- he would rx, then go up, then get low again. Got concerned and went to ER but waited too long and went home.                 CT Abd- pancreatic protocol  7/28/23  FINDINGS:             ABDOMEN:  Calcified granulomas are present in the lung bases.  Prior cholecystectomy.  There is a   subcentimeter cyst in the right hepatic lobe.  The spleen and adrenal glands are normal.  There are   bilateral Bosniak type 1 " "and type 2 renal cysts measuring up to 5.8 cm.  The pancreas has   homogeneous enhancement.  There is some mild fatty infiltration.  No evidence of discrete   pancreatic mass or peripancreatic inflammatory change.  Degenerative changes are present in the   visualized thoracolumbar spine.  The abdominal aorta has a normal caliber.     IMPRESSION:  No acute findings.  No evidence of discrete pancreatic mass or acute pancreatitis.    Medical Records Reviewed:  Pt states these glucoses are FASTING   Latest Reference Range & Units 02/01/22 09:51 07/05/22 12:07 01/10/23 09:36 06/28/23 11:59   Glucose 65 - 99 mg/dL 110 (H) 79 101 (H) 110 (H)   (H): Data is abnormally high     Latest Reference Range & Units 01/22/19 15:12 03/12/20 10:28 02/01/22 09:51 06/28/23 11:59   Hemoglobin A1C 4.80 - 5.60 %    5.20   C-Peptide 1.1 - 4.4 ng/mL    1.8   TSH Baseline 0.270 - 4.200 uIU/mL 2.620 2.750 2.190    Free T4 0.93 - 1.70 ng/dL   1.14      MEDICATIONS  Current Outpatient Medications   Medication Sig Dispense Refill    acarbose (PRECOSE) 100 MG tablet Take 1 tablet by mouth 3 (Three) Times a Day With Meals. 90 tablet 11    amitriptyline (ELAVIL) 75 MG tablet 1 tablet Daily.      apixaban (Eliquis) 2.5 MG tablet tablet Take 1 tablet by mouth 2 (Two) Times a Day. 180 tablet 3    Continuous Blood Gluc Sensor (FreeStyle Evelio 3 Sensor) misc 1 each Every 14 (Fourteen) Days. 2 each PRN    HYDROcodone-acetaminophen (NORCO) 7.5-325 MG per tablet 7.5 tablets Every 12 (Twelve) Hours.      losartan (COZAAR) 25 MG tablet Take 0.5 tablets by mouth Every Night. 45 tablet 3    multivitamin with minerals tablet tablet Take 1 tablet by mouth Daily.       No current facility-administered medications for this visit.       Physical Exam:  Vital Signs:  /60   Pulse 64   Temp 97 øF (36.1 øC)   Ht 193 cm (75.98\")   Wt 97.5 kg (215 lb)   SpO2 98%   BMI 26.18 kg/mý   Estimated body mass index is 25.98 kg/mý as calculated from the following:    " "Height as of 7/26/23: 193 cm (76\").    Weight as of 7/26/23: 96.8 kg (213 lb 6.5 oz).     General appearance - Well dev WM, alert, well appearing, and in no distress  Mental status - affect appropriate to mood  Neck - No masses, no thyromegaly or nodules  Lymphatics - no palpable cervical lymphadenopathy  Chest - CTA, normal effort  Heart - normal rate and regular rhythm, no murmurs noted, no edema  Neurological - no tremors, normal gait  Skin - no acanthosis, no skin tags       Assessment and Plan   Diagnoses and all orders for this visit:    1. Hypoglycemia (Primary)  Comment:pt with hx of abdominal surgery, at least gastric.  Hx of postprandial hypoglycemia type symptoms dating back 50 years but with increasing frequency/severity in past several months including severe/potentially life-threatening hypoglycemia, with levels down to 30s.   Has lost 100# deliberately  Fasting glucoses c/w preDM, with normal A1C  No spontaneous nocturnal hypoglycemia on Evelio, hypos are all in response to meals/food  Treating lows with CHOS/glucose  Plan:  -Continue Evelio 3. Use data to avoid foods that spike glucoses, as it is the spikes that are followed by episodes of hypoglycemia  -Treat lows/impending lows with addition of peanut butter to avoid repeated rebounds/yo-yo-ing  -Hypoglycemia/preDM diet: needs to see dietician  -Try to do labs after meal that causes hypoglycemia, as previously planned. Orders are in. Pt wants to do near home- gave him the list of which labs (that are ordered) need to be done after he eats/goes to lab  After labs:restart acarbose 25 mg TID with meals.  (He had 100 mg tabs, says he will at least try splitting these first)      2. Prediabetes  As above- f glc increased, c/w preDM  -     Ambulatory Referral to Diabetic Education- needs hypoglycemia diet        Plan of care reviewed with patient who verbalizes understanding and agrees.  Jalyn Mcknight MD [facility]    I spent 8  minutes on the " separately reported service of CGM interpretation/report. This time is not included in the time used to support the E/M service also reported today.    Total time spent in caring for patient today (excluding separately billed services) re: data review, chart prep, in-person visit, orders, coordination of care, and documentation: 35 minutes.    Follow Up  Return in about 3 months (around 11/7/2023).  Patient was given instructions and counseling regarding his condition or for health maintenance advice. Please see specific information pulled into the AVS if appropriate.

## 2023-09-12 ENCOUNTER — OFFICE VISIT (OUTPATIENT)
Dept: FAMILY MEDICINE CLINIC | Age: 68
End: 2023-09-12
Payer: MEDICARE

## 2023-09-12 VITALS
SYSTOLIC BLOOD PRESSURE: 122 MMHG | HEART RATE: 59 BPM | DIASTOLIC BLOOD PRESSURE: 71 MMHG | WEIGHT: 210.8 LBS | HEIGHT: 76 IN | OXYGEN SATURATION: 99 % | BODY MASS INDEX: 25.67 KG/M2

## 2023-09-12 DIAGNOSIS — E16.2 HYPOGLYCEMIA: Primary | ICD-10-CM

## 2023-09-12 DIAGNOSIS — Z23 IMMUNIZATION DUE: ICD-10-CM

## 2023-09-12 DIAGNOSIS — Z12.83 SCREENING EXAM FOR SKIN CANCER: ICD-10-CM

## 2023-09-12 PROCEDURE — 1160F RVW MEDS BY RX/DR IN RCRD: CPT | Performed by: NURSE PRACTITIONER

## 2023-09-12 PROCEDURE — 1159F MED LIST DOCD IN RCRD: CPT | Performed by: NURSE PRACTITIONER

## 2023-09-12 PROCEDURE — G0008 ADMIN INFLUENZA VIRUS VAC: HCPCS | Performed by: NURSE PRACTITIONER

## 2023-09-12 PROCEDURE — 3074F SYST BP LT 130 MM HG: CPT | Performed by: NURSE PRACTITIONER

## 2023-09-12 PROCEDURE — 90662 IIV NO PRSV INCREASED AG IM: CPT | Performed by: NURSE PRACTITIONER

## 2023-09-12 PROCEDURE — 3078F DIAST BP <80 MM HG: CPT | Performed by: NURSE PRACTITIONER

## 2023-09-12 PROCEDURE — 99213 OFFICE O/P EST LOW 20 MIN: CPT | Performed by: NURSE PRACTITIONER

## 2023-09-12 NOTE — PROGRESS NOTES
"Chief Complaint  Timothy R Spurling presents to [facility] for Follow-up and Hypoglycemia      Subjective     History of Present Illness  Cyril is here to follow up on his hypoglycemic episodes.  He was since seen by endocrinology Jalyn mendez MD July .  She started him on precose.  She was attempting to get a Evelio monitor.  CT pancrease was ordered - no abnormal findings .He has had one episode of hypoglycemia but was related to her pudding.     Wants to have a skin check at derm.  Has a few places on his head he wants looked at.       He has continued to have weight loss- but feels it is due to his dietary changes that were requested by endo.      He does think that his bowels have improved with his dietary changes as well.  He still will have BM every 3 days but feels that he empties more than previous.      Assessment and Plan   Advised that he needs to try to avoid much more weight loss- he will try to increase his calorie intake and maintain his current weight .      Diagnoses and all orders for this visit:    1. Hypoglycemia (Primary)  Comments:  continue to follow up with endo as scheduled    2. Screening exam for skin cancer  -     Ambulatory Referral to Dermatology    3. Immunization due  -     Fluzone High-Dose 65+yrs ([id number])                Follow Up   Return in about 6 months (around 3/12/2024) for Recheck.      No orders of the defined types were placed in this encounter.      There are no discontinued medications.         Review of Systems    Objective     Vitals:    09/12/23 0849   BP: 122/71   BP Location: Left arm   Patient Position: Sitting   Cuff Size: Adult   Pulse: 59   SpO2: 99%   Weight: 95.6 kg (210 lb 12.8 oz)   Height: 193 cm (75.98\")     Body mass index is 25.67 kg/m².     Physical Exam  Vitals reviewed.   Constitutional:       Appearance: Normal appearance.   HENT:      Head: Normocephalic.   Eyes:      Pupils: Pupils are equal, round, and " reactive to light.   Cardiovascular:      Rate and Rhythm: Normal rate and regular rhythm.      Heart sounds: No murmur heard.  Pulmonary:      Effort: Pulmonary effort is normal.      Breath sounds: Normal breath sounds.   Musculoskeletal:         General: Normal range of motion.   Neurological:      Mental Status: He is alert.   Psychiatric:         Mood and Affect: Mood normal.         Behavior: Behavior normal.          Result Review                       No Known Allergies   Past Medical History:   Diagnosis Date    Arthritis     Cardiomyopathy     CHF (congestive heart failure)     Chronic deep vein thrombosis (DVT) of lower extremity 01/05/2022    Gastric ulcer     HL (hearing loss)     Hyperlipemia 01/05/2022    Hypertension 01/05/2022    Hypoglycemia     Pancreatitis      Current Outpatient Medications   Medication Sig Dispense Refill    acarbose (PRECOSE) 25 MG tablet Take 1 tablet by mouth 3 (Three) Times a Day With Meals. 90 tablet 5    amitriptyline (ELAVIL) 75 MG tablet 1 tablet Daily.      apixaban (Eliquis) 2.5 MG tablet tablet Take 1 tablet by mouth 2 (Two) Times a Day. 180 tablet 3    Continuous Blood Gluc Sensor (FreeStyle Evelio 3 Sensor) misc 1 each Every 14 (Fourteen) Days. 2 each PRN    HYDROcodone-acetaminophen (NORCO) 7.5-325 MG per tablet 7.5 tablets Every 12 (Twelve) Hours.      losartan (COZAAR) 25 MG tablet Take 0.5 tablets by mouth Every Night. 45 tablet 3    multivitamin with minerals tablet tablet Take 1 tablet by mouth Daily.       No current facility-administered medications for this visit.     Past Surgical History:   Procedure Laterality Date    CARDIAC CATHETERIZATION      CHOLECYSTECTOMY      COLONOSCOPY      COLONOSCOPY N/A 05/26/2022    Procedure: COLONOSCOPY WITH BIOPSY;  Surgeon: Teddy Gupta MD;  Location: [facility] END[facility];  Service: Gastroenterology;  Laterality: N/A;  RECTAL EROSIONS    ERCP      FRACTURE SURGERY      JOINT REPLACEMENT      STOMACH SURGERY         There are no preventive care reminders to display for this patient.   Immunization History   Administered Date(s) Administered    COVID-19 (MODERNA) BIVALENT 12+YRS 10/12/2022    COVID-19 (PFIZER) BIVALENT 12+YRS 10/12/2022    COVID-19 (PFIZER) Purple Cap Monovalent 03/04/2021, 03/25/2021, 10/15/2021    Covid-19 (Pfizer) Gray Cap Monovalent 04/21/2022    Fluzone (or Fluarix & Flulaval for VFC) >6mos 10/05/2021    Fluzone High-Dose 65+yrs 10/12/2022, 09/12/2023    Pneumococcal Conjugate 13-Valent (PCV13) 02/22/2022    Pneumococcal Conjugate 20-Valent (PCV20) 03/06/2023    Shingrix 07/27/2020, 09/28/2020    Tdap 02/22/2017         Part of this note may be an electronic transcription/translation of spoken language to printed   text using the Dragon Dictation System.      LINDA Bustos

## 2023-10-19 ENCOUNTER — CLINICAL SUPPORT (OUTPATIENT)
Dept: FAMILY MEDICINE CLINIC | Age: 68
End: 2023-10-19
Payer: MEDICARE

## 2023-11-08 ENCOUNTER — OFFICE VISIT (OUTPATIENT)
Dept: ENDOCRINOLOGY | Age: 68
End: 2023-11-08
Payer: MEDICARE

## 2023-11-08 VITALS
DIASTOLIC BLOOD PRESSURE: 84 MMHG | HEART RATE: 56 BPM | WEIGHT: 205.2 LBS | SYSTOLIC BLOOD PRESSURE: 124 MMHG | BODY MASS INDEX: 24.99 KG/M2 | HEIGHT: 76 IN | OXYGEN SATURATION: 98 % | TEMPERATURE: 97.6 F

## 2023-11-08 DIAGNOSIS — E16.2 HYPOGLYCEMIA: Primary | ICD-10-CM

## 2023-11-08 PROCEDURE — 3074F SYST BP LT 130 MM HG: CPT | Performed by: NURSE PRACTITIONER

## 2023-11-08 PROCEDURE — 99213 OFFICE O/P EST LOW 20 MIN: CPT | Performed by: NURSE PRACTITIONER

## 2023-11-08 PROCEDURE — 3079F DIAST BP 80-89 MM HG: CPT | Performed by: NURSE PRACTITIONER

## 2023-11-08 PROCEDURE — 95251 CONT GLUC MNTR ANALYSIS I&R: CPT | Performed by: NURSE PRACTITIONER

## 2023-11-08 NOTE — PROGRESS NOTES
"Chief Complaint  Chief Complaint   Patient presents with    Hypoglycemia     Pt evelio is attached, is up to date on eye exam, no hx of retinopathy, mild neuropathy.        Subjective          History of Present Illness    Timothy R Spurling 67 y.o. presents for a evaluation for Hypoglycemia        Pt had gastric surgery 50 years ago for ulcers- thinks had partial gastrectomy, not aware of intestinal surgery.  After surgery had issues after meals with sweating, visual changes, numb lips and lethargy, which was relieved with eating.  He never checked his blood sugar.  This would happen about every 6 months.       Deliberately lost over 100 lbs between [id number], then episodes started happening more frequently - episodes almost daily or every other day          Pt is on the following medications: acarbose 25 mg TID with meals   acarbose 100 mg TID stopped due to gas         CT Abd-pancreatic protocol 07/2023 didn't show any evidence of pancreatis mass      A1c in 06/23 was 5.2    C-Peptide in 06/23 was normal at 1.8        Blood Sugars    Blood glucoses are checked via Evelio.    Fasting blood glucoses: 60s to low 100s    Pre-meal blood glucoses: 80s- low 100s    Post prandial blood glucoses: 60s - low 100s    Pt has episodes of hypoglycemia that are more after dinner      Pt has hypoglycemic unawareness - symptoms in the 30s          Sensor Data    Time in range 99%  High 0%  Very high 0%  Low 1%  Very low 0%      Average Glucose - 107 mg/dL      Sensor Wear - 89 %              I have reviewed the patient's allergies, medicines, past medical hx, family hx and social hx.    Objective   Vital Signs:   /84   Pulse 56   Temp 97.6 °F (36.4 °C) (Oral)   Ht 193 cm (75.98\")   Wt 93.1 kg (205 lb 3.2 oz)   SpO2 98%   BMI 24.99 kg/m²       Physical Exam   Physical Exam  Constitutional:       General: He is not in acute distress.     Appearance: Normal appearance. He is not diaphoretic.   HENT:      Head: Normocephalic " and atraumatic.   Eyes:      General:         Right eye: No discharge.         Left eye: No discharge.   Skin:     General: Skin is warm and dry.   Neurological:      Mental Status: He is alert.   Psychiatric:         Mood and Affect: Mood normal.         Behavior: Behavior normal.                    Results Review:   Hemoglobin A1C   Date Value Ref Range Status   06/28/2023 5.20 4.80 - 5.60 % Final     Total Cholesterol   Date Value Ref Range Status   01/10/2023 159 0 - 200 mg/dL Final     Triglycerides   Date Value Ref Range Status   01/10/2023 67 0 - 150 mg/dL Final     HDL Cholesterol   Date Value Ref Range Status   01/10/2023 72 (H) 40 - 60 mg/dL Final     LDL Cholesterol    Date Value Ref Range Status   01/10/2023 74 0 - 100 mg/dL Final     VLDL Cholesterol   Date Value Ref Range Status   01/10/2023 13 5 - 40 mg/dL Final     LDL/HDL Ratio   Date Value Ref Range Status   01/10/2023 1.02  Final         Assessment and Plan {CC Problem List  Visit Diagnosis  ROS  Review (Popup)  Health Maintenance  Quality  BestPractice  Medications  SmartSets  SnapShot Encounters  Media :23  Diagnoses and all orders for this visit:    1. Hypoglycemia (Primary)       Change acarbose 25 mg with breakfast and lunch and 50 mg with dinner  Talked about diet - low carb, high protein, small frequent meals  Talked about how to correct hypoglycemia and prevent re-bound hypoglycemia  Continue with Evelio        RTC in 3-4 month with Dr. Wilson      Follow Up     Patient was given instructions and counseling regarding her condition or for health maintenance advice. Please see specific information pulled into the AVS if appropriate.              LINDA Maxwell  11/08/23

## 2023-12-04 ENCOUNTER — OFFICE VISIT (OUTPATIENT)
Dept: CARDIOLOGY | Facility: CLINIC | Age: 68
End: 2023-12-04
Payer: MEDICARE

## 2023-12-04 VITALS
HEART RATE: 99 BPM | SYSTOLIC BLOOD PRESSURE: 100 MMHG | BODY MASS INDEX: 24.62 KG/M2 | DIASTOLIC BLOOD PRESSURE: 66 MMHG | HEIGHT: 75 IN | WEIGHT: 198 LBS

## 2023-12-04 DIAGNOSIS — I82.509 CHRONIC DEEP VEIN THROMBOSIS (DVT) OF LOWER EXTREMITY, UNSPECIFIED LATERALITY, UNSPECIFIED VEIN: Chronic | ICD-10-CM

## 2023-12-04 DIAGNOSIS — I95.1 ORTHOSTATIC HYPOTENSION: ICD-10-CM

## 2023-12-04 DIAGNOSIS — I10 PRIMARY HYPERTENSION: Primary | Chronic | ICD-10-CM

## 2023-12-04 DIAGNOSIS — I42.9 CARDIOMYOPATHY, UNSPECIFIED TYPE: ICD-10-CM

## 2023-12-04 PROCEDURE — 3078F DIAST BP <80 MM HG: CPT | Performed by: INTERNAL MEDICINE

## 2023-12-04 PROCEDURE — 99214 OFFICE O/P EST MOD 30 MIN: CPT | Performed by: INTERNAL MEDICINE

## 2023-12-04 PROCEDURE — 3074F SYST BP LT 130 MM HG: CPT | Performed by: INTERNAL MEDICINE

## 2023-12-04 NOTE — PROGRESS NOTES
Chief Complaint  Cardiomyopathy, Dizziness, and Shortness of Breath    Subjective        Timothy R Spurling presents to [facility] CARDIOLOG[facility]  History of present illness:    Patient states overall he is doing well.  He still notes some orthostasis.  He is drinking lots of Mountain Dew and not much water.  He states he did have a heart cath with Dr. Medrano in the past and had no blockages.  He notes some bruising but no bleeding.  He notes no significant palpitations.  He did have a history of PVCs but he states that this is better.  He denies any edema.  He does not smoke.      Past Medical History:   Diagnosis Date    Arthritis     Cardiomyopathy     CHF (congestive heart failure)     Chronic deep vein thrombosis (DVT) of lower extremity 01/05/2022    Gastric ulcer     HL (hearing loss)     Hyperlipemia 01/05/2022    Hypertension 01/05/2022    Hypoglycemia     Pancreatitis          Past Surgical History:   Procedure Laterality Date    CARDIAC CATHETERIZATION      CHOLECYSTECTOMY      COLONOSCOPY      COLONOSCOPY N/A 05/26/2022    Procedure: COLONOSCOPY WITH BIOPSY;  Surgeon: Teddy Gupta MD;  Location: [facility] ENDOSCOPY;  Service: Gastroenterology;  Laterality: N/A;  RECTAL EROSIONS    ERCP      FRACTURE SURGERY      JOINT REPLACEMENT      STOMACH SURGERY            Social History     Socioeconomic History    Marital status:    Tobacco Use    Smoking status: Never    Smokeless tobacco: Never   Vaping Use    Vaping Use: Never used   Substance and Sexual Activity    Alcohol use: Not Currently     Comment: Beer on rare occasion    Drug use: Never    Sexual activity: Not Currently     Partners: Female         Family History   Problem Relation Age of Onset    Emphysema Mother     Arthritis Mother     Clotting disorder Father     Lung cancer Father     Stroke Father     Lung cancer Brother     Colon cancer Neg Hx           No Known Allergies         Current Outpatient Medications:      "acarbose (PRECOSE) 25 MG tablet, Take 1 tablet by mouth 3 (Three) Times a Day With Meals., Disp: 90 tablet, Rfl: 5    amitriptyline (ELAVIL) 75 MG tablet, 1 tablet Daily., Disp: , Rfl:     apixaban (Eliquis) 2.5 MG tablet tablet, Take 1 tablet by mouth 2 (Two) Times a Day., Disp: 180 tablet, Rfl: 3    Continuous Blood Gluc Sensor (FreeStyle Evelio 3 Sensor) misc, 1 each Every 14 (Fourteen) Days., Disp: 2 each, Rfl: PRN    HYDROcodone-acetaminophen (NORCO) 7.5-325 MG per tablet, 7.5 tablets Every 12 (Twelve) Hours., Disp: , Rfl:     losartan (COZAAR) 25 MG tablet, Take 0.5 tablets by mouth Every Night., Disp: 45 tablet, Rfl: 3    multivitamin with minerals tablet tablet, Take 1 tablet by mouth Daily., Disp: , Rfl:       ROS:  Cardiac review of systems positive for orthostasis.    Objective     /66   Pulse 99   Ht 190.5 cm (75\")   Wt 89.8 kg (198 lb)   BMI 24.75 kg/m²       General Appearance:   well developed  well nourished  HENT:   oropharynx moist  lips not cyanotic  Respiratory:  no respiratory distress  normal breath sounds  no rales  Cardiovascular:  no jugular venous distention  regular rhythm  S1 normal, S2 normal  no S3, no S4   no murmur  no rub, no thrill  No carotid bruit  pedal pulses normal  lower extremity edema: none    Musculoskeletal:  no clubbing of fingers.   normocephalic, head atraumatic  Skin:   warm, dry  Psychiatric:  judgement and insight appropriate  normal mood and affect    ECHO:  Results for orders placed during the hospital encounter of 03/16/23    Adult Transthoracic Echo Complete w/ Color, Spectral and Contrast if necessary per protocol    Interpretation Summary  Mildly reduced left ventricular systolic function with an estimated ejection fraction of 45-50% and mild global hypokinesis.  Left ventricular diastolic function could not be fully assessed, as no tissue Doppler images were provided by the sonographer.  Mild mitral regurgitation, but no hemodynamically significant " valvular pathology.  Right ventricular systolic pressure could not be accurately estimated due to an insufficient TR velocity profile.  No evidence of pericardial effusion.    STRESS:    CATH:  No results found for this or any previous visit.    BMP:     Glucose   Date Value Ref Range Status   07/26/2023 104 (H) 65 - 99 mg/dL Final     BUN   Date Value Ref Range Status   07/26/2023 21 8 - 23 mg/dL Final     Creatinine   Date Value Ref Range Status   07/26/2023 1.22 0.76 - 1.27 mg/dL Final     Sodium   Date Value Ref Range Status   07/26/2023 140 136 - 145 mmol/L Final     Potassium   Date Value Ref Range Status   07/26/2023 5.0 3.5 - 5.2 mmol/L Final     Chloride   Date Value Ref Range Status   07/26/2023 104 98 - 107 mmol/L Final     CO2   Date Value Ref Range Status   07/26/2023 26.3 22.0 - 29.0 mmol/L Final     Calcium   Date Value Ref Range Status   07/26/2023 9.0 8.6 - 10.5 mg/dL Final     BUN/Creatinine Ratio   Date Value Ref Range Status   07/26/2023 17.2 7.0 - 25.0 Final     Anion Gap   Date Value Ref Range Status   07/26/2023 9.7 5.0 - 15.0 mmol/L Final     eGFR   Date Value Ref Range Status   07/26/2023 65.4 >60.0 mL/min/1.73 Final     LIPIDS:  Total Cholesterol   Date Value Ref Range Status   01/10/2023 159 0 - 200 mg/dL Final     Triglycerides   Date Value Ref Range Status   01/10/2023 67 0 - 150 mg/dL Final     HDL Cholesterol   Date Value Ref Range Status   01/10/2023 72 (H) 40 - 60 mg/dL Final     LDL Cholesterol    Date Value Ref Range Status   01/10/2023 74 0 - 100 mg/dL Final     VLDL Cholesterol   Date Value Ref Range Status   01/10/2023 13 5 - 40 mg/dL Final     LDL/HDL Ratio   Date Value Ref Range Status   01/10/2023 1.02  Final         Procedures             ASSESSMENT:  Diagnoses and all orders for this visit:    1. Primary hypertension (Primary)    2. Chronic deep vein thrombosis (DVT) of lower extremity, unspecified laterality, unspecified vein    3. Orthostatic hypotension    4.  Cardiomyopathy, unspecified type         PLAN:    1.  Asked patient to increase his water intake and cut back on his caffeine.  If his orthostasis gets worse at that time we would have to consider taking him off the losartan.  He did not tolerate midodrine with headaches.  2.  Patient's cholesterol is been under excellent control.  3.  Continue the Eliquis.  The patient notes no bleeding problems.  He does note some bruising.  4.  Patient's last echocardiogram 3/16/2023 showed ejection fraction 45 to 50%.  5.  Patient does not smoke.  6.  Encouraged the patient to continue to remain active even during the winter and call us if any exertional chest pain.  Overall he appears to be doing well from a heart standpoint and his modifiable risk factors are under excellent control.      Return in about 6 months (around 6/4/2024).     Patient was given instructions and counseling regarding his condition or for health maintenance advice. Please see specific information pulled into the AVS if appropriate.         Zaheer Duarte MD   12/4/2023  09:37 EST

## 2024-03-12 ENCOUNTER — LAB (OUTPATIENT)
Dept: LAB | Facility: HOSPITAL | Age: 69
End: 2024-03-12
Payer: MEDICARE

## 2024-03-12 ENCOUNTER — OFFICE VISIT (OUTPATIENT)
Dept: FAMILY MEDICINE CLINIC | Age: 69
End: 2024-03-12
Payer: MEDICARE

## 2024-03-12 VITALS
BODY MASS INDEX: 24.49 KG/M2 | OXYGEN SATURATION: 98 % | DIASTOLIC BLOOD PRESSURE: 61 MMHG | HEIGHT: 75 IN | HEART RATE: 84 BPM | WEIGHT: 197 LBS | SYSTOLIC BLOOD PRESSURE: 95 MMHG

## 2024-03-12 DIAGNOSIS — Z12.5 SCREENING FOR MALIGNANT NEOPLASM OF PROSTATE: ICD-10-CM

## 2024-03-12 DIAGNOSIS — E16.2 HYPOGLYCEMIA: Primary | ICD-10-CM

## 2024-03-12 DIAGNOSIS — I48.91 ATRIAL FIBRILLATION, UNSPECIFIED TYPE: ICD-10-CM

## 2024-03-12 DIAGNOSIS — Z13.6 SCREENING FOR CARDIOVASCULAR CONDITION: ICD-10-CM

## 2024-03-12 LAB
ALBUMIN SERPL-MCNC: 4.4 G/DL (ref 3.5–5.2)
ALBUMIN/GLOB SERPL: 2.4 G/DL
ALP SERPL-CCNC: 62 U/L (ref 39–117)
ALT SERPL W P-5'-P-CCNC: 11 U/L (ref 1–41)
ANION GAP SERPL CALCULATED.3IONS-SCNC: 8.4 MMOL/L (ref 5–15)
AST SERPL-CCNC: 20 U/L (ref 1–40)
BASOPHILS # BLD AUTO: 0 10*3/MM3 (ref 0–0.2)
BASOPHILS NFR BLD AUTO: 0 % (ref 0–1.5)
BILIRUB SERPL-MCNC: 0.6 MG/DL (ref 0–1.2)
BUN SERPL-MCNC: 14 MG/DL (ref 8–23)
BUN/CREAT SERPL: 13.6 (ref 7–25)
CALCIUM SPEC-SCNC: 9 MG/DL (ref 8.6–10.5)
CHLORIDE SERPL-SCNC: 106 MMOL/L (ref 98–107)
CHOLEST SERPL-MCNC: 145 MG/DL (ref 0–200)
CO2 SERPL-SCNC: 26.6 MMOL/L (ref 22–29)
CREAT SERPL-MCNC: 1.03 MG/DL (ref 0.76–1.27)
DEPRECATED RDW RBC AUTO: 44.9 FL (ref 37–54)
EGFRCR SERPLBLD CKD-EPI 2021: 79.6 ML/MIN/1.73
EOSINOPHIL # BLD AUTO: 0.04 10*3/MM3 (ref 0–0.4)
EOSINOPHIL NFR BLD AUTO: 1.1 % (ref 0.3–6.2)
ERYTHROCYTE [DISTWIDTH] IN BLOOD BY AUTOMATED COUNT: 12.6 % (ref 12.3–15.4)
GLOBULIN UR ELPH-MCNC: 1.8 GM/DL
GLUCOSE SERPL-MCNC: 100 MG/DL (ref 65–99)
HCT VFR BLD AUTO: 40.7 % (ref 37.5–51)
HDLC SERPL-MCNC: 72 MG/DL (ref 40–60)
HGB BLD-MCNC: 13.7 G/DL (ref 13–17.7)
IMM GRANULOCYTES # BLD AUTO: 0 10*3/MM3 (ref 0–0.05)
IMM GRANULOCYTES NFR BLD AUTO: 0 % (ref 0–0.5)
LDLC SERPL CALC-MCNC: 59 MG/DL (ref 0–100)
LDLC/HDLC SERPL: 0.83 {RATIO}
LYMPHOCYTES # BLD AUTO: 0.91 10*3/MM3 (ref 0.7–3.1)
LYMPHOCYTES NFR BLD AUTO: 25.2 % (ref 19.6–45.3)
MCH RBC QN AUTO: 32.4 PG (ref 26.6–33)
MCHC RBC AUTO-ENTMCNC: 33.7 G/DL (ref 31.5–35.7)
MCV RBC AUTO: 96.2 FL (ref 79–97)
MONOCYTES # BLD AUTO: 0.28 10*3/MM3 (ref 0.1–0.9)
MONOCYTES NFR BLD AUTO: 7.8 % (ref 5–12)
NEUTROPHILS NFR BLD AUTO: 2.38 10*3/MM3 (ref 1.7–7)
NEUTROPHILS NFR BLD AUTO: 65.9 % (ref 42.7–76)
PLATELET # BLD AUTO: 145 10*3/MM3 (ref 140–450)
PMV BLD AUTO: 10 FL (ref 6–12)
POTASSIUM SERPL-SCNC: 4.5 MMOL/L (ref 3.5–5.2)
PROT SERPL-MCNC: 6.2 G/DL (ref 6–8.5)
PSA SERPL-MCNC: 1.08 NG/ML (ref 0–4)
RBC # BLD AUTO: 4.23 10*6/MM3 (ref 4.14–5.8)
SODIUM SERPL-SCNC: 141 MMOL/L (ref 136–145)
TRIGL SERPL-MCNC: 68 MG/DL (ref 0–150)
VLDLC SERPL-MCNC: 14 MG/DL (ref 5–40)
WBC NRBC COR # BLD AUTO: 3.61 10*3/MM3 (ref 3.4–10.8)

## 2024-03-12 PROCEDURE — 36415 COLL VENOUS BLD VENIPUNCTURE: CPT

## 2024-03-12 PROCEDURE — 80053 COMPREHEN METABOLIC PANEL: CPT

## 2024-03-12 PROCEDURE — 3074F SYST BP LT 130 MM HG: CPT | Performed by: NURSE PRACTITIONER

## 2024-03-12 PROCEDURE — 1160F RVW MEDS BY RX/DR IN RCRD: CPT | Performed by: NURSE PRACTITIONER

## 2024-03-12 PROCEDURE — 85025 COMPLETE CBC W/AUTO DIFF WBC: CPT

## 2024-03-12 PROCEDURE — 80061 LIPID PANEL: CPT

## 2024-03-12 PROCEDURE — 99213 OFFICE O/P EST LOW 20 MIN: CPT | Performed by: NURSE PRACTITIONER

## 2024-03-12 PROCEDURE — 1159F MED LIST DOCD IN RCRD: CPT | Performed by: NURSE PRACTITIONER

## 2024-03-12 PROCEDURE — 3078F DIAST BP <80 MM HG: CPT | Performed by: NURSE PRACTITIONER

## 2024-03-12 PROCEDURE — G0103 PSA SCREENING: HCPCS

## 2024-03-12 NOTE — PROGRESS NOTES
Chief Complaint  Timothy R Spurling presents to [facility] for Hypoglycemia (6 month follow up )      Subjective     History of Present Illness    Hypoglycemia  Has freestyle brianna and this is helping quite a bit.  He states that he is having less frequent episodes .  Ranges low in the 50s but not often.  He does report that he is probably not eating as he did before the passing of his wife.He has lost more weight and we discussed that for his height, he needs to maintain his current weight.    He does have a follow up appt with endocrinology for follow up regarding this as well.  He was placed on acarbose 3 times per day to help stabilize his BS.        Assessment and Plan       Diagnoses and all orders for this visit:    1. Hypoglycemia (Primary)  Comments:  advise the he try to eat regularly and not skip meals. try to avoid any further weight loss follow up with endo as recommended    2. Atrial fibrillation, unspecified type  Comments:  monitor CBC due to potential medication SE  Orders:  -     CBC & Differential; Future    3. Screening for cardiovascular condition  -     Comprehensive metabolic panel; Future  -     Lipid panel; Future    4. Screening for malignant neoplasm of prostate  -     PSA SCREENING; Future            Follow Up   Return in about 6 months (around 9/12/2024) for Medicare Wellness.  Future Appointments   Date Time Provider Department Center   3/22/2024 10:00 AM Carlos Wilson DO [facility]  [place]   4/9/2024  8:15 AM Eda Gu APRN [facility] [place]   6/4/2024  9:30 AM Zaheer Duarte MD [facility]   9/13/2024  8:00 AM Eda Gu APRN [facility] [place]       No orders of the defined types were placed in this encounter.      There are no discontinued medications.       Review of Systems    Objective     Vitals:    03/12/24 0844   BP: 95/61   BP Location: Left arm   Patient Position: Sitting   Cuff Size: Large Adult   Pulse: 84   SpO2:  "98%   Weight: 89.4 kg (197 lb)   Height: 190.5 cm (75\")     Body mass index is 24.62 kg/m².   BMI is within normal parameters. No other follow-up for BMI required.      Physical Exam  Vitals reviewed.   Constitutional:       Appearance: Normal appearance.   HENT:      Head: Normocephalic.   Eyes:      Pupils: Pupils are equal, round, and reactive to light.   Cardiovascular:      Rate and Rhythm: Normal rate and regular rhythm.      Heart sounds: No murmur heard.  Pulmonary:      Effort: Pulmonary effort is normal.      Breath sounds: Normal breath sounds.   Musculoskeletal:         General: Normal range of motion.   Neurological:      Mental Status: He is alert.   Psychiatric:         Mood and Affect: Mood normal.         Behavior: Behavior normal.            Result Review               No Known Allergies   Past Medical History:   Diagnosis Date    Arthritis     Cardiomyopathy     CHF (congestive heart failure)     Chronic deep vein thrombosis (DVT) of lower extremity 01/05/2022    Gastric ulcer     HL (hearing loss)     Hyperlipemia 01/05/2022    Hypertension 01/05/2022    Hypoglycemia     Low back pain     Long time ago    Pancreatitis      Current Outpatient Medications   Medication Sig Dispense Refill    acarbose (PRECOSE) 25 MG tablet Take 1 tablet by mouth 3 (Three) Times a Day With Meals. 90 tablet 5    amitriptyline (ELAVIL) 75 MG tablet 1 tablet Daily.      apixaban (Eliquis) 2.5 MG tablet tablet Take 1 tablet by mouth 2 (Two) Times a Day. 180 tablet 3    Continuous Blood Gluc Sensor (FreeStyle Evelio 3 Sensor) misc 1 each Every 14 (Fourteen) Days. 2 each PRN    HYDROcodone-acetaminophen (NORCO) 7.5-325 MG per tablet 7.5 tablets Every 12 (Twelve) Hours.      losartan (COZAAR) 25 MG tablet Take 0.5 tablets by mouth Every Night. 45 tablet 3    multivitamin with minerals tablet tablet Take 1 tablet by mouth Daily.       No current facility-administered medications for this visit.     Past Surgical History: "   Procedure Laterality Date    CARDIAC CATHETERIZATION      CHOLECYSTECTOMY      COLONOSCOPY      COLONOSCOPY N/A 05/26/2022    Procedure: COLONOSCOPY WITH BIOPSY;  Surgeon: Teddy Gupta MD;  Location: [facility] ENDOSCOPY;  Service: Gastroenterology;  Laterality: N/A;  RECTAL EROSIONS    ERCP      FRACTURE SURGERY      JOINT REPLACEMENT      STOMACH SURGERY        Health Maintenance Due   Topic Date Due    ANNUAL WELLNESS VISIT  03/06/2024      Immunization History   Administered Date(s) Administered    ABRYSVO (RSV, 60+ or pregnant women 32-36 wks) 09/13/2023    COVID-19 (MODERNA) BIVALENT 12+YRS 10/12/2022    COVID-19 (PFIZER) BIVALENT 12+YRS 10/12/2022    COVID-19 (PFIZER) Purple Cap Monovalent 03/04/2021, 03/25/2021, 10/15/2021    COVID-19 F23 (PFIZER) 12YRS+ (COMIRNATY) 10/03/2023    Covid-19 (Pfizer) Gray Cap Monovalent 04/21/2022    Fluzone (or Fluarix & Flulaval for VFC) >6mos 10/05/2021    Fluzone High-Dose 65+yrs 10/12/2022, 09/12/2023    Pneumococcal Conjugate 13-Valent (PCV13) 02/22/2022    Pneumococcal Conjugate 20-Valent (PCV20) 10/19/2023    Shingrix 07/27/2020, 09/28/2020    Tdap 02/22/2017         Part of this note may be an electronic transcription/translation of spoken language to printed   text using the Dragon Dictation System.      LINDA Bustos

## 2024-03-22 ENCOUNTER — OFFICE VISIT (OUTPATIENT)
Dept: ENDOCRINOLOGY | Age: 69
End: 2024-03-22
Payer: MEDICARE

## 2024-03-22 VITALS
OXYGEN SATURATION: 98 % | SYSTOLIC BLOOD PRESSURE: 128 MMHG | BODY MASS INDEX: 23.66 KG/M2 | DIASTOLIC BLOOD PRESSURE: 66 MMHG | WEIGHT: 190.3 LBS | HEART RATE: 62 BPM | HEIGHT: 75 IN

## 2024-03-22 DIAGNOSIS — E16.2 HYPOGLYCEMIA: Primary | ICD-10-CM

## 2024-03-22 RX ORDER — ACARBOSE 25 MG/1
25 TABLET ORAL
Qty: 90 TABLET | Refills: 5 | Status: SHIPPED | OUTPATIENT
Start: 2024-03-22 | End: 2025-03-22

## 2024-03-22 RX ORDER — BLOOD-GLUCOSE SENSOR
1 EACH MISCELLANEOUS
Qty: 2 EACH | Refills: 5 | Status: SHIPPED | OUTPATIENT
Start: 2024-03-22

## 2024-03-22 NOTE — PROGRESS NOTES
Chief complaint/Reason for consult: Hypoglycemia    HPI:   - 68 year old male here for hypoglycemia  - He had surgery on his GI tract for ulcers as a teenager and has had hypoglycemia since  - He takes acarbose 12.5 mg tid  - He does have hypoglycemia unawareness and uses a FreeStyle Evelio CGM  - He states his symptoms are pretty well controlled as long as he does not eat high carb larger meals    The following portions of the patient's history were reviewed and updated as appropriate: allergies, current medications, past family history, past medical history, past social history, past surgical history, and problem list.    Objective     Vitals:    03/22/24 0940   BP: 128/66   Pulse: 62   SpO2: 98%        Physical Exam  Vitals reviewed.   Constitutional:       Appearance: Normal appearance.   HENT:      Head: Normocephalic and atraumatic.   Eyes:      General: No scleral icterus.  Pulmonary:      Effort: Pulmonary effort is normal. No respiratory distress.   Neurological:      Mental Status: He is alert.      Gait: Gait normal.   Psychiatric:         Mood and Affect: Mood normal.         Behavior: Behavior normal.         Thought Content: Thought content normal.         Judgment: Judgment normal.     CGM interpretation  Dates reviewed: 3/9-3/22/24  Data: 99% time in range, 1% low  Interpretation:  Rarely has hypoglycemia      Assessment & Plan   Hypoglycemia secondary to GI surgery  - Cont. acarbose 12.5 mg tid  - Discussed eating more frequent, higher protein, lower carb meals    - Return to clinic in 6 months

## 2024-03-27 DIAGNOSIS — I42.9 CARDIOMYOPATHY, UNSPECIFIED TYPE: ICD-10-CM

## 2024-03-27 NOTE — TELEPHONE ENCOUNTER
PT IS REQUESTING LOSARTAN REFILL TO BE SENT TO [facility].  NOT PREVIOUS PRESCRIBER.

## 2024-03-28 RX ORDER — LOSARTAN POTASSIUM 25 MG/1
12.5 TABLET ORAL NIGHTLY
Qty: 45 TABLET | Refills: 3 | Status: SHIPPED | OUTPATIENT
Start: 2024-03-28

## 2024-04-09 ENCOUNTER — LAB (OUTPATIENT)
Dept: LAB | Facility: HOSPITAL | Age: 69
End: 2024-04-09
Payer: MEDICARE

## 2024-04-09 ENCOUNTER — OFFICE VISIT (OUTPATIENT)
Dept: FAMILY MEDICINE CLINIC | Age: 69
End: 2024-04-09
Payer: MEDICARE

## 2024-04-09 VITALS
WEIGHT: 192.6 LBS | DIASTOLIC BLOOD PRESSURE: 66 MMHG | HEIGHT: 75 IN | BODY MASS INDEX: 23.95 KG/M2 | OXYGEN SATURATION: 96 % | HEART RATE: 68 BPM | SYSTOLIC BLOOD PRESSURE: 100 MMHG

## 2024-04-09 DIAGNOSIS — E11.9 TYPE 2 DIABETES MELLITUS WITHOUT COMPLICATION, WITHOUT LONG-TERM CURRENT USE OF INSULIN: ICD-10-CM

## 2024-04-09 DIAGNOSIS — I95.1 ORTHOSTATIC HYPOTENSION: ICD-10-CM

## 2024-04-09 DIAGNOSIS — E55.9 VITAMIN D DEFICIENCY, UNSPECIFIED: ICD-10-CM

## 2024-04-09 DIAGNOSIS — R53.83 FATIGUE, UNSPECIFIED TYPE: ICD-10-CM

## 2024-04-09 DIAGNOSIS — Z00.00 MEDICARE ANNUAL WELLNESS VISIT, SUBSEQUENT: Primary | ICD-10-CM

## 2024-04-09 DIAGNOSIS — I48.91 ATRIAL FIBRILLATION, UNSPECIFIED TYPE: ICD-10-CM

## 2024-04-09 DIAGNOSIS — R39.15 URINARY URGENCY: ICD-10-CM

## 2024-04-09 DIAGNOSIS — R06.00 DYSPNEA, UNSPECIFIED TYPE: ICD-10-CM

## 2024-04-09 LAB
25(OH)D3 SERPL-MCNC: 29.6 NG/ML (ref 30–100)
BACTERIA UR QL AUTO: NORMAL /HPF
BILIRUB UR QL STRIP: NEGATIVE
CLARITY UR: CLEAR
COLOR UR: YELLOW
GLUCOSE UR STRIP-MCNC: NEGATIVE MG/DL
HGB UR QL STRIP.AUTO: ABNORMAL
IRON 24H UR-MRATE: 60 MCG/DL (ref 59–158)
IRON SATN MFR SERPL: 19 % (ref 20–50)
KETONES UR QL STRIP: NEGATIVE
LEUKOCYTE ESTERASE UR QL STRIP.AUTO: NEGATIVE
NITRITE UR QL STRIP: NEGATIVE
PH UR STRIP.AUTO: 7 [PH] (ref 5–8)
PROT UR QL STRIP: ABNORMAL
RBC # UR STRIP: NORMAL /HPF
REF LAB TEST METHOD: NORMAL
SP GR UR STRIP: 1.02 (ref 1–1.03)
SQUAMOUS #/AREA URNS HPF: NORMAL /HPF
TIBC SERPL-MCNC: 316 MCG/DL (ref 298–536)
TRANSFERRIN SERPL-MCNC: 212 MG/DL (ref 200–360)
TSH SERPL DL<=0.05 MIU/L-ACNC: 1.98 UIU/ML (ref 0.27–4.2)
UROBILINOGEN UR QL STRIP: ABNORMAL
VIT B12 BLD-MCNC: 498 PG/ML (ref 211–946)
WBC # UR STRIP: NORMAL /HPF

## 2024-04-09 PROCEDURE — G0439 PPPS, SUBSEQ VISIT: HCPCS | Performed by: NURSE PRACTITIONER

## 2024-04-09 PROCEDURE — 36415 COLL VENOUS BLD VENIPUNCTURE: CPT

## 2024-04-09 PROCEDURE — 84443 ASSAY THYROID STIM HORMONE: CPT

## 2024-04-09 PROCEDURE — 82306 VITAMIN D 25 HYDROXY: CPT

## 2024-04-09 PROCEDURE — 3078F DIAST BP <80 MM HG: CPT | Performed by: NURSE PRACTITIONER

## 2024-04-09 PROCEDURE — 81001 URINALYSIS AUTO W/SCOPE: CPT

## 2024-04-09 PROCEDURE — 99214 OFFICE O/P EST MOD 30 MIN: CPT | Performed by: NURSE PRACTITIONER

## 2024-04-09 PROCEDURE — 83540 ASSAY OF IRON: CPT

## 2024-04-09 PROCEDURE — 82607 VITAMIN B-12: CPT

## 2024-04-09 PROCEDURE — 84466 ASSAY OF TRANSFERRIN: CPT

## 2024-04-09 PROCEDURE — 3074F SYST BP LT 130 MM HG: CPT | Performed by: NURSE PRACTITIONER

## 2024-04-09 PROCEDURE — 1160F RVW MEDS BY RX/DR IN RCRD: CPT | Performed by: NURSE PRACTITIONER

## 2024-04-09 PROCEDURE — 1170F FXNL STATUS ASSESSED: CPT | Performed by: NURSE PRACTITIONER

## 2024-04-09 PROCEDURE — 1159F MED LIST DOCD IN RCRD: CPT | Performed by: NURSE PRACTITIONER

## 2024-04-09 NOTE — PROGRESS NOTES
The ABCs of the Annual Wellness Visit  Subsequent Medicare Wellness Visit    Subjective    Timothy R Spurling is a 68 y.o. male who presents for a Subsequent Medicare Wellness Visit.    The following portions of the patient's history were reviewed and   updated as appropriate: allergies, current medications, past family history, past medical history, past social history, past surgical history, and problem list.    Compared to one year ago, the patient feels his physical   health is the same.    Compared to one year ago, the patient feels his mental   health is worse the passing of his wife.    Recent Hospitalizations:  He was not admitted to the hospital during the last year.       Current Medical Providers:  Patient Care Team:  Eda Gu APRN as PCP - General (Nurse Practitioner)  Marilee Farfan APRN as Nurse Practitioner (Pain Medicine)  Jason Peck MD as Consulting Physician (Interventional Cardiology)    Outpatient Medications Prior to Visit   Medication Sig Dispense Refill    acarbose (PRECOSE) 25 MG tablet Take 1 tablet by mouth 3 (Three) Times a Day With Meals. 90 tablet 5    amitriptyline (ELAVIL) 75 MG tablet 1 tablet Daily.      apixaban (Eliquis) 2.5 MG tablet tablet Take 1 tablet by mouth 2 (Two) Times a Day. 180 tablet 3    Continuous Blood Gluc Sensor (FreeStyle Evelio 3 Sensor) misc Use 1 each Every 14 (Fourteen) Days. 2 each 5    HYDROcodone-acetaminophen (NORCO) 7.5-325 MG per tablet 7.5 tablets Every 12 (Twelve) Hours.      losartan (COZAAR) 25 MG tablet Take 0.5 tablets by mouth Every Night. 45 tablet 3    multivitamin with minerals tablet tablet Take 1 tablet by mouth Daily.       No facility-administered medications prior to visit.       Opioid medication/s are on active medication list.  and I have evaluated his active treatment plan and pain score trends (see table).  There were no vitals filed for this visit.  I have reviewed the chart for potential of high risk medication and  "harmful drug interactions in the elderly.          Aspirin is not on active medication list.  Aspirin use is not indicated based on review of current medical condition/s. Risk of harm outweighs potential benefits.  .    Patient Active Problem List   Diagnosis    Hyperlipemia    Hypertension    Chronic deep vein thrombosis (DVT) of lower extremity    Chronic systolic congestive heart failure    Atrial fibrillation    Cardiomyopathy    Coagulation disorder    Degeneration of lumbosacral intervertebral disc    Derangement of knee    History of histoplasmosis    Arthritis    Primary localized osteoarthritis    Seasonal allergic rhinitis    Spondylosis without myelopathy    Encounter for screening for malignant neoplasm of colon    Neuropathy    Urinary frequency    Shortness of breath    Constipation    Orthostatic hypotension    Prediabetes    Hypoglycemia     Advance Care Planning   Advance Care Planning     Advance Directive is not on file.  ACP discussion was held with the patient during this visit. Patient has an advance directive (not in EMR), copy requested.     Objective    Vitals:    04/09/24 0758   BP: 100/66   BP Location: Left arm   Patient Position: Sitting   Pulse: 68   SpO2: 96%   Weight: 87.4 kg (192 lb 9.6 oz)   Height: 190.5 cm (75\")     Estimated body mass index is 24.07 kg/m² as calculated from the following:    Height as of this encounter: 190.5 cm (75\").    Weight as of this encounter: 87.4 kg (192 lb 9.6 oz).    BMI is within normal parameters. No other follow-up for BMI required.      Does the patient have evidence of cognitive impairment? No    Lab Results   Component Value Date    TRIG 68 03/12/2024    HDL 72 (H) 03/12/2024    LDL 59 03/12/2024    VLDL 14 03/12/2024        HEALTH RISK ASSESSMENT    Smoking Status:  Social History     Tobacco Use   Smoking Status Never   Smokeless Tobacco Never     Alcohol Consumption:  Social History     Substance and Sexual Activity   Alcohol Use Not Currently "    Comment: Beer on rare occasion     Fall Risk Screen:    KAYYDANIELA Fall Risk Assessment was completed, and patient is at LOW risk for falls.Assessment completed on:2024    Depression Screenin/9/2024     8:03 AM   PHQ-2/PHQ-9 Depression Screening   Little Interest or Pleasure in Doing Things 0-->not at all   Feeling Down, Depressed or Hopeless 1-->several days   PHQ-9: Brief Depression Severity Measure Score 1       Health Habits and Functional and Cognitive Screenin/9/2024     8:04 AM   Functional & Cognitive Status   Do you have difficulty preparing food and eating? No   Do you have difficulty bathing yourself, getting dressed or grooming yourself? No   Do you have difficulty using the toilet? No   Do you have difficulty moving around from place to place? No   Do you have trouble with steps or getting out of a bed or a chair? No   Current Diet Well Balanced Diet   Dental Exam Not up to date   Eye Exam Not up to date   Exercise (times per week) 0 times per week   Current Exercises Include No Regular Exercise;Yard Work;Walking   Do you need help using the phone?  No   Are you deaf or do you have serious difficulty hearing?  Yes   Do you need help to go to places out of walking distance? No   Do you need help shopping? No   Do you need help preparing meals?  No   Do you need help with housework?  No   Do you need help with laundry? No   Do you need help taking your medications? No   Do you need help managing money? No   Do you ever drive or ride in a car without wearing a seat belt? No   Have you felt unusual stress, anger or loneliness in the last month? No   Who do you live with? Alone   If you need help, do you have trouble finding someone available to you? No   Have you been bothered in the last four weeks by sexual problems? No   Do you have difficulty concentrating, remembering or making decisions? No       Age-appropriate Screening Schedule:  Refer to the list below for future screening  recommendations based on patient's age, sex and/or medical conditions. Orders for these recommended tests are listed in the plan section. The patient has been provided with a written plan.    Health Maintenance   Topic Date Due    URINE MICROALBUMIN  Never done    DIABETIC EYE EXAM  Never done    DIABETIC FOOT EXAM  Never done    HEMOGLOBIN A1C  12/28/2023    INFLUENZA VACCINE  08/01/2024    LIPID PANEL  03/12/2025    ANNUAL WELLNESS VISIT  04/09/2025    TDAP/TD VACCINES (2 - Td or Tdap) 02/22/2027    COLORECTAL CANCER SCREENING  05/26/2027    HEPATITIS C SCREENING  Completed    COVID-19 Vaccine  Completed    RSV Vaccine - Adults  Completed    Pneumococcal Vaccine 65+  Completed    ZOSTER VACCINE  Completed                  CMS Preventative Services Quick Reference  Risk Factors Identified During Encounter  Depression/Dysphoria:  declines referral   Hearing Problem:  declines referral      Vision Screening Recommended  The above risks/problems have been discussed with the patient.  Pertinent information has been shared with the patient in the After Visit Summary.  An After Visit Summary and PPPS were made available to the patient.    Follow Up:   Next Medicare Wellness visit to be scheduled in 1 year.       Additional E&M Note during same encounter follows:  Patient has multiple medical problems which are significant and separately identifiable that require additional work above and beyond the Medicare Wellness Visit.      Chief Complaint  Medicare Wellness-subsequent    Subjective        HPI  Timothy R Spurling is also being seen today for dizziness    Time reports continues with dizziness with position changes.  He states that his BP is low most days.  He is only taking losartan 12.5 mg at night.  He has reduced his amitrptyline to 1/2 tab over the past 2 weeks thinking this may help but has not seen much in the way of improvement yet.  He is unsure if the hydrocodone is associated with his episodes as he only  "taking sparingly.  He does have problem with hypoglycemia but does not feel this is connected with this finding.      He has also been experiencing extreme fatigue and dyspnea     Review of Systems   Constitutional:  Positive for fatigue.   Respiratory:  Positive for shortness of breath.    Neurological:  Positive for dizziness, weakness and light-headedness.       Objective   Vital Signs:  /66 (BP Location: Left arm, Patient Position: Sitting)   Pulse 68   Ht 190.5 cm (75\")   Wt 87.4 kg (192 lb 9.6 oz)   SpO2 96%   BMI 24.07 kg/m²     Vitals:    04/09/24 0758 04/09/24 0857 04/09/24 0858 04/09/24 0859   Orthostatic BP:  108/68 100/66 87/60   Orthostatic Pulse:  98 98 98   Patient Position: Sitting Lying Sitting Standing      Physical Exam  Vitals reviewed.   Constitutional:       Appearance: Normal appearance.   HENT:      Head: Normocephalic.   Eyes:      Pupils: Pupils are equal, round, and reactive to light.   Cardiovascular:      Rate and Rhythm: Normal rate and regular rhythm.      Heart sounds: No murmur heard.     Comments: Orthostatic hypotension  Pulmonary:      Effort: Pulmonary effort is normal.      Breath sounds: Normal breath sounds.   Musculoskeletal:         General: Normal range of motion.   Neurological:      Mental Status: He is alert.   Psychiatric:         Mood and Affect: Mood normal.         Behavior: Behavior normal.                         Assessment and Plan   Diagnoses and all orders for this visit:    1. Medicare annual wellness visit, subsequent (Primary)    2. Fatigue, unspecified type  Comments:  will check B12 and vitamin levels  Orders:  -     Vitamin B12; Future  -     Vitamin D 25 hydroxy; Future  -     Iron Profile; Future  -     TSH; Future    3. Dyspnea, unspecified type  Comments:  if no indication on labs, will move forward with CT chest  Orders:  -     Vitamin D 25 hydroxy; Future  -     Iron Profile; Future    4. Vitamin D deficiency, " unspecified  Comments:  repeat levels  Orders:  -     Vitamin D 25 hydroxy; Future    5. Type 2 diabetes mellitus without complication, without long-term current use of insulin  -     Iron Profile; Future    6. Urinary urgency  Comments:  consider referral to urology   avoid medicatino secondary to hypotension with previous treatment  Orders:  -     Urinalysis With Culture If Indicated -; Future    7. Orthostatic hypotension  Comments:  advised to increase his salt intake, compression socks, change position slowly    8. Atrial fibrillation, unspecified type  Comments:  consider medicaitn cause of his symptoms  he will discuss further with his cardiologist at his next upcoming appt             Follow Up   Return for Pending lab results, Pending imaging results.  Patient was given instructions and counseling regarding his condition or for health maintenance advice. Please see specific information pulled into the AVS if appropriate.

## 2024-04-13 RX ORDER — MELATONIN
1000 DAILY
Qty: 90 TABLET | Refills: 1 | Status: SHIPPED | OUTPATIENT
Start: 2024-04-13

## 2024-05-23 ENCOUNTER — HOSPITAL ENCOUNTER (OUTPATIENT)
Facility: HOSPITAL | Age: 69
Discharge: HOME | End: 2024-05-24
Attending: EMERGENCY MEDICINE | Admitting: FAMILY MEDICINE
Payer: MEDICARE

## 2024-05-23 ENCOUNTER — APPOINTMENT (OUTPATIENT)
Dept: CT IMAGING | Facility: HOSPITAL | Age: 69
End: 2024-05-23
Payer: MEDICARE

## 2024-05-23 ENCOUNTER — APPOINTMENT (OUTPATIENT)
Dept: GENERAL RADIOLOGY | Facility: HOSPITAL | Age: 69
End: 2024-05-23
Payer: MEDICARE

## 2024-05-23 DIAGNOSIS — R26.2 DIFFICULTY IN WALKING: ICD-10-CM

## 2024-05-23 DIAGNOSIS — G45.9 TIA (TRANSIENT ISCHEMIC ATTACK): Primary | ICD-10-CM

## 2024-05-23 DIAGNOSIS — Z78.9 DECREASED ACTIVITIES OF DAILY LIVING (ADL): ICD-10-CM

## 2024-05-23 LAB
ABO GROUP BLD: NORMAL
ABO GROUP BLD: NORMAL
ALBUMIN SERPL-MCNC: 4 G/DL (ref 3.5–5.2)
ALBUMIN/GLOB SERPL: 1.8 G/DL
ALP SERPL-CCNC: 57 U/L (ref 39–117)
ALT SERPL W P-5'-P-CCNC: 13 U/L (ref 1–41)
ANION GAP SERPL CALCULATED.3IONS-SCNC: 8.8 MMOL/L (ref 5–15)
APTT PPP: 31.7 SECONDS (ref 24.2–34.2)
AST SERPL-CCNC: 18 U/L (ref 1–40)
BASOPHILS # BLD AUTO: 0.01 10*3/MM3 (ref 0–0.2)
BASOPHILS NFR BLD AUTO: 0.2 % (ref 0–1.5)
BILIRUB SERPL-MCNC: 0.5 MG/DL (ref 0–1.2)
BILIRUB UR QL STRIP: NEGATIVE
BLD GP AB SCN SERPL QL: NEGATIVE
BUN SERPL-MCNC: 13 MG/DL (ref 8–23)
BUN/CREAT SERPL: 12.4 (ref 7–25)
CALCIUM SPEC-SCNC: 8.8 MG/DL (ref 8.6–10.5)
CHLORIDE SERPL-SCNC: 106 MMOL/L (ref 98–107)
CLARITY UR: CLEAR
CO2 SERPL-SCNC: 27.2 MMOL/L (ref 22–29)
COLOR UR: YELLOW
CREAT SERPL-MCNC: 1.05 MG/DL (ref 0.76–1.27)
DEPRECATED RDW RBC AUTO: 42.9 FL (ref 37–54)
EGFRCR SERPLBLD CKD-EPI 2021: 77.8 ML/MIN/1.73
EOSINOPHIL # BLD AUTO: 0.04 10*3/MM3 (ref 0–0.4)
EOSINOPHIL NFR BLD AUTO: 0.7 % (ref 0.3–6.2)
ERYTHROCYTE [DISTWIDTH] IN BLOOD BY AUTOMATED COUNT: 12.4 % (ref 12.3–15.4)
GLOBULIN UR ELPH-MCNC: 2.2 GM/DL
GLUCOSE BLDC GLUCOMTR-MCNC: 117 MG/DL (ref 70–99)
GLUCOSE SERPL-MCNC: 105 MG/DL (ref 65–99)
GLUCOSE UR STRIP-MCNC: NEGATIVE MG/DL
HCT VFR BLD AUTO: 38.4 % (ref 37.5–51)
HGB BLD-MCNC: 13 G/DL (ref 13–17.7)
HGB UR QL STRIP.AUTO: NEGATIVE
HOLD SPECIMEN: NORMAL
IMM GRANULOCYTES # BLD AUTO: 0.01 10*3/MM3 (ref 0–0.05)
IMM GRANULOCYTES NFR BLD AUTO: 0.2 % (ref 0–0.5)
INR PPP: 1.15 (ref 0.86–1.15)
KETONES UR QL STRIP: NEGATIVE
LEUKOCYTE ESTERASE UR QL STRIP.AUTO: NEGATIVE
LYMPHOCYTES # BLD AUTO: 0.73 10*3/MM3 (ref 0.7–3.1)
LYMPHOCYTES NFR BLD AUTO: 13.5 % (ref 19.6–45.3)
MAGNESIUM SERPL-MCNC: 2 MG/DL (ref 1.6–2.4)
MCH RBC QN AUTO: 32.1 PG (ref 26.6–33)
MCHC RBC AUTO-ENTMCNC: 33.9 G/DL (ref 31.5–35.7)
MCV RBC AUTO: 94.8 FL (ref 79–97)
MONOCYTES # BLD AUTO: 0.37 10*3/MM3 (ref 0.1–0.9)
MONOCYTES NFR BLD AUTO: 6.9 % (ref 5–12)
NEUTROPHILS NFR BLD AUTO: 4.24 10*3/MM3 (ref 1.7–7)
NEUTROPHILS NFR BLD AUTO: 78.5 % (ref 42.7–76)
NITRITE UR QL STRIP: NEGATIVE
NRBC BLD AUTO-RTO: 0 /100 WBC (ref 0–0.2)
PH UR STRIP.AUTO: 6 [PH] (ref 5–8)
PLATELET # BLD AUTO: 142 10*3/MM3 (ref 140–450)
PMV BLD AUTO: 9.5 FL (ref 6–12)
POTASSIUM SERPL-SCNC: 4.1 MMOL/L (ref 3.5–5.2)
PROT SERPL-MCNC: 6.2 G/DL (ref 6–8.5)
PROT UR QL STRIP: NEGATIVE
PROTHROMBIN TIME: 15 SECONDS (ref 11.8–14.9)
RBC # BLD AUTO: 4.05 10*6/MM3 (ref 4.14–5.8)
RH BLD: POSITIVE
RH BLD: POSITIVE
SODIUM SERPL-SCNC: 142 MMOL/L (ref 136–145)
SP GR UR STRIP: 1.01 (ref 1–1.03)
T&S EXPIRATION DATE: NORMAL
TROPONIN T SERPL HS-MCNC: 10 NG/L
UROBILINOGEN UR QL STRIP: NORMAL
WBC NRBC COR # BLD AUTO: 5.4 10*3/MM3 (ref 3.4–10.8)
WHOLE BLOOD HOLD COAG: NORMAL
WHOLE BLOOD HOLD SPECIMEN: NORMAL

## 2024-05-23 PROCEDURE — 99285 EMERGENCY DEPT VISIT HI MDM: CPT

## 2024-05-23 PROCEDURE — 70450 CT HEAD/BRAIN W/O DYE: CPT

## 2024-05-23 PROCEDURE — G0378 HOSPITAL OBSERVATION PER HR: HCPCS

## 2024-05-23 PROCEDURE — 86850 RBC ANTIBODY SCREEN: CPT | Performed by: EMERGENCY MEDICINE

## 2024-05-23 PROCEDURE — 36415 COLL VENOUS BLD VENIPUNCTURE: CPT

## 2024-05-23 PROCEDURE — 71045 X-RAY EXAM CHEST 1 VIEW: CPT

## 2024-05-23 PROCEDURE — 86901 BLOOD TYPING SEROLOGIC RH(D): CPT

## 2024-05-23 PROCEDURE — 84484 ASSAY OF TROPONIN QUANT: CPT | Performed by: EMERGENCY MEDICINE

## 2024-05-23 PROCEDURE — 80053 COMPREHEN METABOLIC PANEL: CPT | Performed by: EMERGENCY MEDICINE

## 2024-05-23 PROCEDURE — 81003 URINALYSIS AUTO W/O SCOPE: CPT | Performed by: EMERGENCY MEDICINE

## 2024-05-23 PROCEDURE — 83036 HEMOGLOBIN GLYCOSYLATED A1C: CPT | Performed by: FAMILY MEDICINE

## 2024-05-23 PROCEDURE — 93010 ELECTROCARDIOGRAM REPORT: CPT | Performed by: INTERNAL MEDICINE

## 2024-05-23 PROCEDURE — 25510000001 IOPAMIDOL PER 1 ML: Performed by: EMERGENCY MEDICINE

## 2024-05-23 PROCEDURE — 93005 ELECTROCARDIOGRAM TRACING: CPT | Performed by: EMERGENCY MEDICINE

## 2024-05-23 PROCEDURE — 99204 OFFICE O/P NEW MOD 45 MIN: CPT | Performed by: PSYCHIATRY & NEUROLOGY

## 2024-05-23 PROCEDURE — 99291 CRITICAL CARE FIRST HOUR: CPT

## 2024-05-23 PROCEDURE — 99223 1ST HOSP IP/OBS HIGH 75: CPT | Performed by: FAMILY MEDICINE

## 2024-05-23 PROCEDURE — 70496 CT ANGIOGRAPHY HEAD: CPT

## 2024-05-23 PROCEDURE — 86900 BLOOD TYPING SEROLOGIC ABO: CPT | Performed by: EMERGENCY MEDICINE

## 2024-05-23 PROCEDURE — 85610 PROTHROMBIN TIME: CPT | Performed by: EMERGENCY MEDICINE

## 2024-05-23 PROCEDURE — 82948 REAGENT STRIP/BLOOD GLUCOSE: CPT | Performed by: EMERGENCY MEDICINE

## 2024-05-23 PROCEDURE — 85730 THROMBOPLASTIN TIME PARTIAL: CPT | Performed by: EMERGENCY MEDICINE

## 2024-05-23 PROCEDURE — 80061 LIPID PANEL: CPT | Performed by: FAMILY MEDICINE

## 2024-05-23 PROCEDURE — 84443 ASSAY THYROID STIM HORMONE: CPT | Performed by: FAMILY MEDICINE

## 2024-05-23 PROCEDURE — 85025 COMPLETE CBC W/AUTO DIFF WBC: CPT | Performed by: EMERGENCY MEDICINE

## 2024-05-23 PROCEDURE — 25810000003 SODIUM CHLORIDE 0.9 % SOLUTION: Performed by: EMERGENCY MEDICINE

## 2024-05-23 PROCEDURE — 83735 ASSAY OF MAGNESIUM: CPT | Performed by: EMERGENCY MEDICINE

## 2024-05-23 PROCEDURE — 86900 BLOOD TYPING SEROLOGIC ABO: CPT

## 2024-05-23 PROCEDURE — 70498 CT ANGIOGRAPHY NECK: CPT

## 2024-05-23 PROCEDURE — 86901 BLOOD TYPING SEROLOGIC RH(D): CPT | Performed by: EMERGENCY MEDICINE

## 2024-05-23 RX ORDER — SODIUM CHLORIDE 0.9 % (FLUSH) 0.9 %
10 SYRINGE (ML) INJECTION AS NEEDED
Status: DISCONTINUED | OUTPATIENT
Start: 2024-05-23 | End: 2024-05-24 | Stop reason: HOSPADM

## 2024-05-23 RX ADMIN — IOPAMIDOL 100 ML: 755 INJECTION, SOLUTION INTRAVENOUS at 21:31

## 2024-05-23 RX ADMIN — SODIUM CHLORIDE 1000 ML: 9 INJECTION, SOLUTION INTRAVENOUS at 22:27

## 2024-05-24 ENCOUNTER — APPOINTMENT (OUTPATIENT)
Dept: CARDIOLOGY | Facility: HOSPITAL | Age: 69
End: 2024-05-24
Payer: MEDICARE

## 2024-05-24 ENCOUNTER — APPOINTMENT (OUTPATIENT)
Dept: MRI IMAGING | Facility: HOSPITAL | Age: 69
End: 2024-05-24
Payer: MEDICARE

## 2024-05-24 ENCOUNTER — TELEPHONE (OUTPATIENT)
Dept: FAMILY MEDICINE CLINIC | Age: 69
End: 2024-05-24
Payer: MEDICARE

## 2024-05-24 ENCOUNTER — READMISSION MANAGEMENT (OUTPATIENT)
Dept: CALL CENTER | Facility: HOSPITAL | Age: 69
End: 2024-05-24
Payer: MEDICARE

## 2024-05-24 VITALS
WEIGHT: 192.02 LBS | SYSTOLIC BLOOD PRESSURE: 128 MMHG | RESPIRATION RATE: 18 BRPM | HEART RATE: 48 BPM | DIASTOLIC BLOOD PRESSURE: 56 MMHG | BODY MASS INDEX: 23.38 KG/M2 | HEIGHT: 76 IN | TEMPERATURE: 97.7 F | OXYGEN SATURATION: 97 %

## 2024-05-24 PROBLEM — E16.2 HYPOGLYCEMIA: Status: ACTIVE | Noted: 2024-05-24

## 2024-05-24 PROBLEM — G89.29 CHRONIC PAIN: Status: ACTIVE | Noted: 2024-05-21

## 2024-05-24 PROBLEM — R07.89 CHEST PAIN, ATYPICAL: Status: RESOLVED | Noted: 2024-05-24 | Resolved: 2024-05-24

## 2024-05-24 PROBLEM — R07.89 CHEST PAIN, ATYPICAL: Status: ACTIVE | Noted: 2024-05-24

## 2024-05-24 PROBLEM — G45.9 TIA (TRANSIENT ISCHEMIC ATTACK): Status: RESOLVED | Noted: 2024-05-23 | Resolved: 2024-05-24

## 2024-05-24 LAB
BH CV ECHO MEAS - AO MAX PG: 5.4 MMHG
BH CV ECHO MEAS - AO MEAN PG: 2.9 MMHG
BH CV ECHO MEAS - AO ROOT DIAM: 3.4 CM
BH CV ECHO MEAS - AO V2 MAX: 116.2 CM/SEC
BH CV ECHO MEAS - AO V2 VTI: 27.9 CM
BH CV ECHO MEAS - AVA(I,D): 2.18 CM2
BH CV ECHO MEAS - EDV(CUBED): 89.8 ML
BH CV ECHO MEAS - EDV(MOD-SP2): 58.3 ML
BH CV ECHO MEAS - EDV(MOD-SP4): 56.4 ML
BH CV ECHO MEAS - EF(MOD-BP): 41.3 %
BH CV ECHO MEAS - EF(MOD-SP2): 40.3 %
BH CV ECHO MEAS - EF(MOD-SP4): 44 %
BH CV ECHO MEAS - ESV(CUBED): 45.4 ML
BH CV ECHO MEAS - ESV(MOD-SP2): 34.8 ML
BH CV ECHO MEAS - ESV(MOD-SP4): 31.6 ML
BH CV ECHO MEAS - FS: 20.3 %
BH CV ECHO MEAS - IVS/LVPW: 0.92 CM
BH CV ECHO MEAS - IVSD: 0.84 CM
BH CV ECHO MEAS - LA DIMENSION: 3.8 CM
BH CV ECHO MEAS - LAT PEAK E' VEL: 8.8 CM/SEC
BH CV ECHO MEAS - LV DIASTOLIC VOL/BSA (35-75): 25.9 CM2
BH CV ECHO MEAS - LV MASS(C)D: 127.2 GRAMS
BH CV ECHO MEAS - LV MAX PG: 3.5 MMHG
BH CV ECHO MEAS - LV MEAN PG: 1.75 MMHG
BH CV ECHO MEAS - LV SYSTOLIC VOL/BSA (12-30): 14.5 CM2
BH CV ECHO MEAS - LV V1 MAX: 93 CM/SEC
BH CV ECHO MEAS - LV V1 VTI: 19.1 CM
BH CV ECHO MEAS - LVIDD: 4.5 CM
BH CV ECHO MEAS - LVIDS: 3.6 CM
BH CV ECHO MEAS - LVOT AREA: 3.2 CM2
BH CV ECHO MEAS - LVOT DIAM: 2.01 CM
BH CV ECHO MEAS - LVPWD: 0.91 CM
BH CV ECHO MEAS - MED PEAK E' VEL: 7.9 CM/SEC
BH CV ECHO MEAS - MV A MAX VEL: 52.3 CM/SEC
BH CV ECHO MEAS - MV DEC SLOPE: 365.1 CM/SEC2
BH CV ECHO MEAS - MV DEC TIME: 0.17 SEC
BH CV ECHO MEAS - MV E MAX VEL: 60.4 CM/SEC
BH CV ECHO MEAS - MV E/A: 1.16
BH CV ECHO MEAS - MV MAX PG: 2.09 MMHG
BH CV ECHO MEAS - MV MEAN PG: 0.66 MMHG
BH CV ECHO MEAS - MV V2 VTI: 26.7 CM
BH CV ECHO MEAS - MVA(VTI): 2.28 CM2
BH CV ECHO MEAS - SV(LVOT): 60.8 ML
BH CV ECHO MEAS - SV(MOD-SP2): 23.5 ML
BH CV ECHO MEAS - SV(MOD-SP4): 24.8 ML
BH CV ECHO MEAS - SVI(LVOT): 27.9 ML/M2
BH CV ECHO MEAS - SVI(MOD-SP2): 10.8 ML/M2
BH CV ECHO MEAS - SVI(MOD-SP4): 11.4 ML/M2
BH CV ECHO MEAS - TAPSE (>1.6): 2.44 CM
BH CV ECHO MEASUREMENTS AVERAGE E/E' RATIO: 7.23
CHOLEST SERPL-MCNC: 133 MG/DL (ref 0–200)
GEN 5 2HR TROPONIN T REFLEX: 9 NG/L
GLUCOSE BLDC GLUCOMTR-MCNC: 81 MG/DL (ref 70–99)
GLUCOSE BLDC GLUCOMTR-MCNC: 91 MG/DL (ref 70–99)
HBA1C MFR BLD: 5.2 % (ref 4.8–5.6)
HDLC SERPL-MCNC: 66 MG/DL (ref 40–60)
IVRT: 102 MS
LDLC SERPL CALC-MCNC: 47 MG/DL (ref 0–100)
LDLC/HDLC SERPL: 0.68 {RATIO}
LEFT ATRIUM VOLUME INDEX: 13.6 ML/M2
QT INTERVAL: 477 MS
QTC INTERVAL: 486 MS
TRIGL SERPL-MCNC: 112 MG/DL (ref 0–150)
TROPONIN T DELTA: 0 NG/L
TROPONIN T SERPL HS-MCNC: 9 NG/L
TSH SERPL DL<=0.05 MIU/L-ACNC: 2.75 UIU/ML (ref 0.27–4.2)
VLDLC SERPL-MCNC: 20 MG/DL (ref 5–40)

## 2024-05-24 PROCEDURE — 70551 MRI BRAIN STEM W/O DYE: CPT

## 2024-05-24 PROCEDURE — 92610 EVALUATE SWALLOWING FUNCTION: CPT

## 2024-05-24 PROCEDURE — 93010 ELECTROCARDIOGRAM REPORT: CPT | Performed by: INTERNAL MEDICINE

## 2024-05-24 PROCEDURE — 84484 ASSAY OF TROPONIN QUANT: CPT | Performed by: FAMILY MEDICINE

## 2024-05-24 PROCEDURE — 25010000002 SULFUR HEXAFLUORIDE MICROSPH 60.7-25 MG RECONSTITUTED SUSPENSION: Performed by: FAMILY MEDICINE

## 2024-05-24 PROCEDURE — 63710000001 APIXABAN 2.5 MG TABLET: Performed by: FAMILY MEDICINE

## 2024-05-24 PROCEDURE — G0378 HOSPITAL OBSERVATION PER HR: HCPCS

## 2024-05-24 PROCEDURE — 99239 HOSP IP/OBS DSCHRG MGMT >30: CPT | Performed by: FAMILY MEDICINE

## 2024-05-24 PROCEDURE — 63710000001 ATORVASTATIN 40 MG TABLET: Performed by: FAMILY MEDICINE

## 2024-05-24 PROCEDURE — 93005 ELECTROCARDIOGRAM TRACING: CPT | Performed by: FAMILY MEDICINE

## 2024-05-24 PROCEDURE — 97161 PT EVAL LOW COMPLEX 20 MIN: CPT

## 2024-05-24 PROCEDURE — 63710000001 ASPIRIN 81 MG CHEWABLE TABLET: Performed by: FAMILY MEDICINE

## 2024-05-24 PROCEDURE — A9270 NON-COVERED ITEM OR SERVICE: HCPCS | Performed by: FAMILY MEDICINE

## 2024-05-24 PROCEDURE — 93306 TTE W/DOPPLER COMPLETE: CPT

## 2024-05-24 PROCEDURE — 97165 OT EVAL LOW COMPLEX 30 MIN: CPT

## 2024-05-24 PROCEDURE — 82948 REAGENT STRIP/BLOOD GLUCOSE: CPT

## 2024-05-24 PROCEDURE — 93306 TTE W/DOPPLER COMPLETE: CPT | Performed by: INTERNAL MEDICINE

## 2024-05-24 PROCEDURE — 82948 REAGENT STRIP/BLOOD GLUCOSE: CPT | Performed by: FAMILY MEDICINE

## 2024-05-24 PROCEDURE — 63710000001: Performed by: FAMILY MEDICINE

## 2024-05-24 RX ORDER — AMOXICILLIN 250 MG
2 CAPSULE ORAL 2 TIMES DAILY PRN
Status: DISCONTINUED | OUTPATIENT
Start: 2024-05-24 | End: 2024-05-24 | Stop reason: HOSPADM

## 2024-05-24 RX ORDER — SODIUM CHLORIDE 0.9 % (FLUSH) 0.9 %
10 SYRINGE (ML) INJECTION AS NEEDED
Status: DISCONTINUED | OUTPATIENT
Start: 2024-05-24 | End: 2024-05-24 | Stop reason: HOSPADM

## 2024-05-24 RX ORDER — SODIUM CHLORIDE 0.9 % (FLUSH) 0.9 %
10 SYRINGE (ML) INJECTION EVERY 12 HOURS SCHEDULED
Status: DISCONTINUED | OUTPATIENT
Start: 2024-05-24 | End: 2024-05-24 | Stop reason: HOSPADM

## 2024-05-24 RX ORDER — HYDROCODONE BITARTRATE AND ACETAMINOPHEN 7.5; 325 MG/1; MG/1
7.5 TABLET ORAL 2 TIMES DAILY PRN
Status: DISCONTINUED | OUTPATIENT
Start: 2024-05-24 | End: 2024-05-24 | Stop reason: HOSPADM

## 2024-05-24 RX ORDER — SODIUM CHLORIDE 9 MG/ML
40 INJECTION, SOLUTION INTRAVENOUS AS NEEDED
Status: DISCONTINUED | OUTPATIENT
Start: 2024-05-24 | End: 2024-05-24 | Stop reason: HOSPADM

## 2024-05-24 RX ORDER — POLYETHYLENE GLYCOL 3350 17 G/17G
17 POWDER, FOR SOLUTION ORAL DAILY PRN
Status: DISCONTINUED | OUTPATIENT
Start: 2024-05-24 | End: 2024-05-24 | Stop reason: HOSPADM

## 2024-05-24 RX ORDER — BISACODYL 10 MG
10 SUPPOSITORY, RECTAL RECTAL DAILY PRN
Status: DISCONTINUED | OUTPATIENT
Start: 2024-05-24 | End: 2024-05-24 | Stop reason: HOSPADM

## 2024-05-24 RX ORDER — ATORVASTATIN CALCIUM 80 MG/1
80 TABLET, FILM COATED ORAL NIGHTLY
Qty: 30 TABLET | Refills: 0 | Status: SHIPPED | OUTPATIENT
Start: 2024-05-24 | End: 2024-06-23

## 2024-05-24 RX ORDER — ONDANSETRON 2 MG/ML
4 INJECTION INTRAMUSCULAR; INTRAVENOUS EVERY 6 HOURS PRN
Status: DISCONTINUED | OUTPATIENT
Start: 2024-05-24 | End: 2024-05-24 | Stop reason: HOSPADM

## 2024-05-24 RX ORDER — BISACODYL 5 MG/1
5 TABLET, DELAYED RELEASE ORAL DAILY PRN
Status: DISCONTINUED | OUTPATIENT
Start: 2024-05-24 | End: 2024-05-24 | Stop reason: HOSPADM

## 2024-05-24 RX ORDER — ASPIRIN 300 MG/1
300 SUPPOSITORY RECTAL DAILY
Status: DISCONTINUED | OUTPATIENT
Start: 2024-05-24 | End: 2024-05-24 | Stop reason: HOSPADM

## 2024-05-24 RX ORDER — ACARBOSE 25 MG/1
25 TABLET ORAL
Status: DISCONTINUED | OUTPATIENT
Start: 2024-05-24 | End: 2024-05-24 | Stop reason: HOSPADM

## 2024-05-24 RX ORDER — LOSARTAN POTASSIUM 25 MG/1
12.5 TABLET ORAL NIGHTLY
Status: DISCONTINUED | OUTPATIENT
Start: 2024-05-24 | End: 2024-05-24 | Stop reason: HOSPADM

## 2024-05-24 RX ORDER — ASPIRIN 81 MG/1
81 TABLET, CHEWABLE ORAL DAILY
Qty: 30 TABLET | Refills: 0 | Status: SHIPPED | OUTPATIENT
Start: 2024-05-25 | End: 2024-06-24

## 2024-05-24 RX ORDER — LOSARTAN POTASSIUM 25 MG/1
12.5 TABLET ORAL NIGHTLY
Status: DISCONTINUED | OUTPATIENT
Start: 2024-05-24 | End: 2024-05-24

## 2024-05-24 RX ORDER — ATORVASTATIN CALCIUM 40 MG/1
80 TABLET, FILM COATED ORAL NIGHTLY
Status: DISCONTINUED | OUTPATIENT
Start: 2024-05-24 | End: 2024-05-24 | Stop reason: HOSPADM

## 2024-05-24 RX ORDER — ENOXAPARIN SODIUM 100 MG/ML
30 INJECTION SUBCUTANEOUS DAILY
Status: DISCONTINUED | OUTPATIENT
Start: 2024-05-24 | End: 2024-05-24

## 2024-05-24 RX ORDER — ASPIRIN 81 MG/1
81 TABLET, CHEWABLE ORAL DAILY
Status: DISCONTINUED | OUTPATIENT
Start: 2024-05-24 | End: 2024-05-24 | Stop reason: HOSPADM

## 2024-05-24 RX ADMIN — SULFUR HEXAFLUORIDE 2 ML: KIT at 09:20

## 2024-05-24 RX ADMIN — Medication 10 ML: at 08:25

## 2024-05-24 RX ADMIN — APIXABAN 2.5 MG: 2.5 TABLET, FILM COATED ORAL at 08:25

## 2024-05-24 RX ADMIN — Medication 10 ML: at 01:12

## 2024-05-24 RX ADMIN — ACARBOSE 25 MG: 25 TABLET ORAL at 12:20

## 2024-05-24 RX ADMIN — ATORVASTATIN CALCIUM 80 MG: 40 TABLET, FILM COATED ORAL at 01:11

## 2024-05-24 RX ADMIN — ACARBOSE 25 MG: 25 TABLET ORAL at 08:25

## 2024-05-24 RX ADMIN — ASPIRIN 81 MG: 81 TABLET, CHEWABLE ORAL at 08:25

## 2024-05-24 NOTE — TELEPHONE ENCOUNTER
GABRIELLA AGUILA CALLED NEEDING TO SCHEDULE HOSP F/U. PATIENT ADMITTED ON 5/23/24 FOR TIA- DISCHARGED 5/24/24. SCHEDULED WITH PCP MOUSER ON 6/4/24.

## 2024-05-24 NOTE — ED NOTES
ED to Inpatient Report    PATIENT DEMOGRAPHICS  Timothy R Spurling   1955  68 y.o.  male  No Known Allergies       05/23/24 2056   Weight: 89.7 kg (197 lb 12 oz)      Code Status and Medical Interventions:   Ordered at: 05/23/24 2242     Level Of Support Discussed With:    Patient     Code Status (Patient has no pulse and is not breathing):    CPR (Attempt to Resuscitate)     Medical Interventions (Patient has pulse or is breathing):    Full Support        HISTORY  Past Medical History:   Diagnosis Date    Arthritis     Cardiomyopathy     CHF (congestive heart failure)     Chronic deep vein thrombosis (DVT) of lower extremity 01/05/2022    Gastric ulcer     HL (hearing loss)     Hyperlipemia 01/05/2022    Hypertension 01/05/2022    Hypoglycemia     Low back pain     Long time ago    Pancreatitis       Past Surgical History:   Procedure Laterality Date    CARDIAC CATHETERIZATION      CHOLECYSTECTOMY      COLONOSCOPY      COLONOSCOPY N/A 05/26/2022    Procedure: COLONOSCOPY WITH BIOPSY;  Surgeon: Teddy Gupta MD;  Location: [facility] ENDOSCOPY;  Service: Gastroenterology;  Laterality: N/A;  RECTAL EROSIONS    ERCP      FRACTURE SURGERY      JOINT REPLACEMENT      PANCREAS SURGERY  [id number]    stents    STOMACH SURGERY       Prior to Admission medications    Medication Sig Start Date End Date Taking? Authorizing Provider   acarbose (PRECOSE) 25 MG tablet Take 1 tablet by mouth 3 (Three) Times a Day With Meals. 3/22/24 3/22/25 Yes Carlos Wilson DO   amitriptyline (ELAVIL) 75 MG tablet Take 1 tablet by mouth At Night As Needed.   Yes ProviderRay MD   apixaban (Eliquis) 2.5 MG tablet tablet Take 1 tablet by mouth 2 (Two) Times a Day. 2/8/23  Yes Jason Peck MD   HYDROcodone-acetaminophen (NORCO) 7.5-325 MG per tablet Take 7.5 tablets by mouth 2 (Two) Times a Day As Needed.   Yes ProviderRay MD   losartan (COZAAR) 25 MG tablet Take 0.5 tablets by mouth Every Night.  Patient taking  "differently: Take 0.5 tablets by mouth Every Night. 3/28/24  Yes Vidhya Reynolds APRN   cholecalciferol (Vitamin D) 25 MCG (1000 UT) tablet Take 1 tablet by mouth Daily. 4/13/24   Eda Gu APRN   Continuous Blood Gluc Sensor (FreeStyle Evelio 3 Sensor) [facility] [place] Use 1 each Every 14 (Fourteen) Days. 3/22/24   Carlos Wilson, DO   multivitamin with minerals tablet tablet Take 1 tablet by mouth Daily.    Provider, MD JOCE Bell  Chief Complaint   Patient presents with    Weakness - Generalized    Chest Pain    Numbness      Diagnosis Plan   1. TIA (transient ischemic attack)           Sebastien Burns MD  Vitals:    05/23/24 2113 05/23/24 2115 05/23/24 2140 05/23/24 2330   BP:  95/59  113/61   BP Location:  Left arm  Left arm   Patient Position:  Lying  Lying   Pulse: 76  62 51   Resp: 18   18   Temp:    98.1 °F (36.7 °C)   TempSrc:    Oral   SpO2: 100%  100% 100%   Weight:       Height:  193 cm (76\")       Results for orders placed or performed during the hospital encounter of 05/23/24   Comprehensive Metabolic Panel    Specimen: Arm, Left; Blood   Result Value Ref Range    Glucose 105 (H) 65 - 99 mg/dL    BUN 13 8 - 23 mg/dL    Creatinine 1.05 0.76 - 1.27 mg/dL    Sodium 142 136 - 145 mmol/L    Potassium 4.1 3.5 - 5.2 mmol/L    Chloride 106 98 - 107 mmol/L    CO2 27.2 22.0 - 29.0 mmol/L    Calcium 8.8 8.6 - 10.5 mg/dL    Total Protein 6.2 6.0 - 8.5 g/dL    Albumin 4.0 3.5 - 5.2 g/dL    ALT (SGPT) 13 1 - 41 U/L    AST (SGOT) 18 1 - 40 U/L    Alkaline Phosphatase 57 39 - 117 U/L    Total Bilirubin 0.5 0.0 - 1.2 mg/dL    Globulin 2.2 gm/dL    A/G Ratio 1.8 g/dL    BUN/Creatinine Ratio 12.4 7.0 - 25.0    Anion Gap 8.8 5.0 - 15.0 mmol/L    eGFR 77.8 >60.0 mL/min/1.73   Single High Sensitivity Troponin T    Specimen: Arm, Left; Blood   Result Value Ref Range    HS Troponin T 10 <22 ng/L   Magnesium    Specimen: Arm, Left; Blood   Result Value Ref Range    Magnesium 2.0 1.6 - 2.4 mg/dL   Urinalysis " With Microscopic If Indicated (No Culture) - Urine, Clean Catch    Specimen: Urine, Clean Catch   Result Value Ref Range    Color, UA Yellow Yellow, Straw    Appearance, UA Clear Clear    pH, UA 6.0 5.0 - 8.0    Specific Gravity, UA 1.007 1.005 - 1.030    Glucose, UA Negative Negative    Ketones, UA Negative Negative    Bilirubin, UA Negative Negative    Blood, UA Negative Negative    Protein, UA Negative Negative    Leuk Esterase, UA Negative Negative    Nitrite, UA Negative Negative    Urobilinogen, UA 1.0 E.U./dL 0.2 - 1.0 E.U./dL   CBC Auto Differential    Specimen: Arm, Left; Blood   Result Value Ref Range    WBC 5.40 3.40 - 10.80 10*3/mm3    RBC 4.05 (L) 4.14 - 5.80 10*6/mm3    Hemoglobin 13.0 13.0 - 17.7 g/dL    Hematocrit 38.4 37.5 - 51.0 %    MCV 94.8 79.0 - 97.0 fL    MCH 32.1 26.6 - 33.0 pg    MCHC 33.9 31.5 - 35.7 g/dL    RDW 12.4 12.3 - 15.4 %    RDW-SD 42.9 37.0 - 54.0 fl    MPV 9.5 6.0 - 12.0 fL    Platelets 142 140 - 450 10*3/mm3    Neutrophil % 78.5 (H) 42.7 - 76.0 %    Lymphocyte % 13.5 (L) 19.6 - 45.3 %    Monocyte % 6.9 5.0 - 12.0 %    Eosinophil % 0.7 0.3 - 6.2 %    Basophil % 0.2 0.0 - 1.5 %    Immature Grans % 0.2 0.0 - 0.5 %    Neutrophils, Absolute 4.24 1.70 - 7.00 10*3/mm3    Lymphocytes, Absolute 0.73 0.70 - 3.10 10*3/mm3    Monocytes, Absolute 0.37 0.10 - 0.90 10*3/mm3    Eosinophils, Absolute 0.04 0.00 - 0.40 10*3/mm3    Basophils, Absolute 0.01 0.00 - 0.20 10*3/mm3    Immature Grans, Absolute 0.01 0.00 - 0.05 10*3/mm3    nRBC 0.0 0.0 - 0.2 /100 WBC   Protime-INR    Specimen: Arm, Left; Blood   Result Value Ref Range    Protime 15.0 (H) 11.8 - 14.9 Seconds    INR 1.15 0.86 - 1.15   aPTT    Specimen: Arm, Left; Blood   Result Value Ref Range    PTT 31.7 24.2 - 34.2 seconds   POC Glucose Once    Specimen: Blood   Result Value Ref Range    Glucose 117 (H) 70 - 99 mg/dL   ECG 12 Lead ED Triage Standing Order; Weak / Dizzy / AMS   Result Value Ref Range    QT Interval 477 ms    QTC Interval  486 ms   Type & Screen    Specimen: Arm, Left; Blood   Result Value Ref Range    ABO Type A     RH type Positive     Antibody Screen Negative     T&S Expiration Date 5/30/2024 11:59:00 PM    Green Top (Gel)   Result Value Ref Range    Extra Tube Hold for add-ons.    Lavender Top   Result Value Ref Range    Extra Tube hold for add-on    Gold Top - SST   Result Value Ref Range    Extra Tube Hold for add-ons.    Light Blue Top   Result Value Ref Range    Extra Tube Hold for add-ons.    Gray Top   Result Value Ref Range    Extra Tube Hold for add-ons.       CT Angiogram Head w AI Analysis of LVO   Final Result   (COMBINED)   No emergent large vessel occlusion. No hemodynamically significant   arterial stenoses are seen. The studies are limited as detailed above.   Please see above comments for further detail.               Please note that portions of this note were completed with a voice   recognition program.           Electronically Signed By-Chris Bello MD On:5/23/2024 10:28 PM          CT Angiogram Neck   Final Result   (COMBINED)   No emergent large vessel occlusion. No hemodynamically significant   arterial stenoses are seen. The studies are limited as detailed above.   Please see above comments for further detail.               Please note that portions of this note were completed with a voice   recognition program.           Electronically Signed ByKassi Bello MD On:5/23/2024 10:28 PM          CT Head Without Contrast Stroke Protocol   Final Result   No acute brain abnormality is seen. No acute intracranial hemorrhage. No   definite acute infarct.                   Please note that portions of this note were completed with a voice   recognition program.                   Electronically Signed ByKassi Bello MD On:5/23/2024 9:20 PM          XR Chest 1 View   Final Result   Impression:       1. No acute cardiopulmonary disease.           Electronically Signed By-Migel Hylton MD On:5/23/2024 9:11 PM             Lines, Drains & Airways       Active LDAs       Name Placement date Placement time Site Days    Peripheral IV 05/23/24 2112 Left Antecubital 05/23/24 2112  Antecubital  less than 1                  Medications   sodium chloride 0.9 % flush 10 mL (has no administration in time range)   sodium chloride 0.9 % flush 10 mL (has no administration in time range)   iopamidol (ISOVUE-370) 76 % injection 100 mL (100 mL Intravenous Given 5/23/24 2131)   sodium chloride 0.9 % bolus 1,000 mL (1,000 mL Intravenous New Bag 5/23/24 2227)      ECG 12 Lead ED Triage Standing Order; Weak / Dizzy / AMS   Preliminary Result   HEART RATE= 62  bpm   RR Interval= 964  ms   AK Interval= 217  ms   P Horizontal Axis= -26  deg   P Front Axis= 47  deg   QRSD Interval= 163  ms   QT Interval= 477  ms   QTcB= 486  ms   QRS Axis= 12  deg   T Wave Axis= 126  deg   - ABNORMAL ECG -   Sinus rhythm   Borderline prolonged AK interval   Left bundle branch block   Electronically Signed By:    Date and Time of Study: 2024-05-23 21:37:26           Sylvie Rodriguez RN  05/23/24 23:47 EDT

## 2024-05-24 NOTE — THERAPY EVALUATION
"Patient Name: Timothy R Spurling  : 1955    MRN: [id number]                              Today's Date: 2024       Admit Date: 2024    Visit Dx:     ICD-10-CM ICD-9-CM   1. TIA (transient ischemic attack)  G45.9 435.9   2. Difficulty in walking  R26.2 719.7   3. Decreased activities of daily living (ADL)  Z78.9 V49.89     Patient Active Problem List   Diagnosis    Hyperlipemia    Hypertension    Chronic deep vein thrombosis (DVT) of lower extremity    Chronic systolic congestive heart failure    Atrial fibrillation    Cardiomyopathy    Coagulation disorder    Degeneration of lumbosacral intervertebral disc    Derangement of knee    History of histoplasmosis    Arthritis    Primary localized osteoarthritis    Seasonal allergic rhinitis    Spondylosis without myelopathy    Encounter for screening for malignant neoplasm of colon    Neuropathy    Urinary frequency    Shortness of breath    Constipation    Orthostatic hypotension    Prediabetes    Hypoglycemia    TIA (transient ischemic attack)    Chest pain, atypical     Past Medical History:   Diagnosis Date    Arthritis     \"all over\"    Cancer     pre-cancerous areas on scalp. used chemo cream    Cardiomyopathy     CHF (congestive heart failure)     Chronic deep vein thrombosis (DVT) of lower extremity 2022    bilateral    Gastric ulcer     History of transfusion     HL (hearing loss)     Hyperlipemia 2022    Hypertension 2022    Hypoglycemia     Low back pain     Long time ago    Pancreatitis     PVC (premature ventricular contraction)      Past Surgical History:   Procedure Laterality Date    CARDIAC CATHETERIZATION      CHOLECYSTECTOMY      COLONOSCOPY      COLONOSCOPY N/A 2022    Procedure: COLONOSCOPY WITH BIOPSY;  Surgeon: Teddy Gupta MD;  Location: [facility] ENDOSCOPY;  Service: Gastroenterology;  Laterality: N/A;  RECTAL EROSIONS    ERCP      FRACTURE SURGERY      JOINT REPLACEMENT Right     right knee " replacement    PANCREAS SURGERY  [id number]    stents placed and removed    STOMACH SURGERY        General Information       Row Name 05/24/24 1057          OT Time and Intention    Document Type evaluation  -ES     Mode of Treatment individual therapy;occupational therapy  -ES       Row Name 05/24/24 1057          General Information    Prior Level of Function independent:;ADL's;all household mobility;community mobility  Patient independent with B and IADLs at baseline. No device for functional mobility. Tub/shower, standing shower completion. [place] in stance. No home O2 use. Patient denies recent falls.  -ES     Existing Precautions/Restrictions no known precautions/restrictions  -ES     Barriers to Rehab none identified  -ES       Row Name 05/24/24 1057          Occupational Profile    Reason for Services/Referral (Occupational Profile) Patient is 68 yr old male admitted to [facility] on 5/23/2024 with reports of left sided weakness, slurred speech. OT evaluation and treatment ordered d/t recent decline in ADLs/transfer ability and discharge planning recommendations. No previous OT services for current condition.  -ES       Row Name 05/24/24 1057          Living Environment    People in Home alone  -ES       Row Name 05/24/24 1057          Cognition    Orientation Status (Cognition) oriented x 4  patient pleasant and cooperative, agreeable to therapy evaluation. Patient daughter present.  -ES               User Key  (r) = Recorded By, (t) = Taken By, (c) = Cosigned By      Initials Name Provider Type    Kady Nogueira, OTR/L, CSRS Occupational Therapist                     Mobility/ADL's       Row Name 05/24/24 1059          Bed Mobility    Bed Mobility supine-sit;sit-supine  -ES     Supine-Sit [place] (Bed Mobility) independent  -ES     Sit-Supine [place] (Bed Mobility) independent  -ES       Row Name 05/24/24 1059          Transfers    Transfers sit-stand transfer;stand-sit transfer  -ES        Row Name 05/24/24 1059          Sit-Stand Transfer    Sit-Stand [place] (Transfers) independent  -ES     Assistive Device (Sit-Stand Transfers) other (see comments)  no AD  -ES       Row Name 05/24/24 1059          Stand-Sit Transfer    Stand-Sit [place] (Transfers) independent  -ES     Assistive Device (Stand-Sit Transfers) other (see comments)  no AD  -ES       Row Name 05/24/24 1059          Functional Mobility    Functional Mobility- Ind. Level independent  -ES     Functional Mobility- Device other (see comments)  no AD  -ES       Row Name 05/24/24 1059          Activities of Daily Living    BADL Assessment/Intervention bathing;upper body dressing;lower body dressing;grooming;feeding;toileting  -ES       Row Name 05/24/24 1059          Bathing Assessment/Intervention    [place] Level (Bathing) bathing skills;independent  -ES       Row Name 05/24/24 1059          Upper Body Dressing Assessment/Training    [place] Level (Upper Body Dressing) upper body dressing skills;independent  -ES       Row Name 05/24/24 1059          Lower Body Dressing Assessment/Training    [place] Level (Lower Body Dressing) lower body dressing skills;independent  -ES       Row Name 05/24/24 1059          Grooming Assessment/Training    [place] Level (Grooming) grooming skills;independent  -ES       Row Name 05/24/24 1059          Self-Feeding Assessment/Training    [place] Level (Feeding) feeding skills;independent  -ES       Row Name 05/24/24 1059          Toileting Assessment/Training    [place] Level (Toileting) toileting skills;independent  -ES               User Key  (r) = Recorded By, (t) = Taken By, (c) = Cosigned By      Initials Name Provider Type    ES Kady Garcia, OTR/L, CSRS Occupational Therapist                   Obj/Interventions       Row Name 05/24/24 1103          Sensory Assessment (Somatosensory)    Sensory Assessment (Somatosensory) sensation intact  -ES     Sensory  Assessment bilateral upper extremity sensation intact to light and heavy touch. No sesnroy discrimination deficits reported at evaluation  -ES       Row Name 05/24/24 1103          Vision Assessment/Intervention    Visual Impairment/Limitations corrective lenses full-time  -ES     Vision Assessment Comment Patient reports blurry vision has resolved. Quick vision screen intact to all four visual quadrants.  -ES       Row Name 05/24/24 1103          Range of Motion Comprehensive    General Range of Motion bilateral upper extremity ROM WNL  -ES       Row Name 05/24/24 1103          Strength Comprehensive (MMT)    General Manual Muscle Testing (MMT) Assessment no strength deficits identified  -ES     Comment, General Manual Muscle Testing (MMT) Assessment BUEs 4+/5  -ES       Row Name 05/24/24 1103          Motor Skills    Motor Skills functional endurance  -ES     Functional Endurance good  -ES       Row Name 05/24/24 1103          Balance    Balance Assessment sitting dynamic balance;standing dynamic balance  -ES     Dynamic Sitting Balance independent  -ES     Dynamic Standing Balance independent  -ES     Position/Device Used, Standing Balance unsupported  -ES               User Key  (r) = Recorded By, (t) = Taken By, (c) = Cosigned By      Initials Name Provider Type    ES Kady Garcia, OTR/L, CSRS Occupational Therapist                   Goals/Plan    No documentation.                  Clinical Impression       Row Name 05/24/24 1107          Plan of Care Review    Plan of Care Reviewed With patient;daughter  -ES     Outcome Evaluation Patient presents at or near baseline functional status at time of evaluation with no identified functional deficits that impede patient independence with activities of daily living.  No indicated need for skilled occupational therapy intervention in the acute care setting.  Occupational therapy will sign off at this time.  -ES       Row Name 05/24/24 1107          Therapy  Assessment/Plan (OT)    Criteria for Skilled Therapeutic Interventions Met (OT) no problems identified which require skilled intervention  -ES     Therapy Frequency (OT) evaluation only  -ES       Row Name 05/24/24 1107          Therapy Plan Review/Discharge Plan (OT)    Anticipated Discharge Disposition (OT) home  -ES               User Key  (r) = Recorded By, (t) = Taken By, (c) = Cosigned By      Initials Name Provider Type    ES Kady Garcia, OTR/L, CSRS Occupational Therapist                   Outcome Measures       Row Name 05/24/24 1107          How much help from another is currently needed...    Putting on and taking off regular lower body clothing? 4  -ES     Bathing (including washing, rinsing, and drying) 4  -ES     Toileting (which includes using toilet bed pan or urinal) 4  -ES     Putting on and taking off regular upper body clothing 4  -ES     Taking care of personal grooming (such as brushing teeth) 4  -ES     Eating meals 4  -ES     AM-PAC 6 Clicks Score (OT) 24  -ES       Row Name 05/24/24 1054 05/24/24 0720       How much help from another person do you currently need...    Turning from your back to your side while in flat bed without using bedrails? 4 (P)   -MF 4  -AK    Moving from lying on back to sitting on the side of a flat bed without bedrails? 4 (P)   -MF 4  -AK    Moving to and from a bed to a chair (including a wheelchair)? 4 (P)   -MF 4  -AK    Standing up from a chair using your arms (e.g., wheelchair, bedside chair)? 4 (P)   -MF 4  -AK    Climbing 3-5 steps with a railing? 4 (P)   -MF 4  -AK    To walk in hospital room? 4 (P)   -MF 4  -AK    AM-PAC 6 Clicks Score (PT) 24 (P)   -MF 24  -AK    Highest Level of Mobility Goal 8 --> Walked 250 feet or more (P)   -MF 8 --> Walked 250 feet or more  -AK      Row Name 05/24/24 0040          How much help from another person do you currently need...    Turning from your back to your side while in flat bed without using bedrails? 4  -HS      Moving from lying on back to sitting on the side of a flat bed without bedrails? 4  -HS     Moving to and from a bed to a chair (including a wheelchair)? 4  -HS     Standing up from a chair using your arms (e.g., wheelchair, bedside chair)? 4  -HS     Climbing 3-5 steps with a railing? 4  -HS     To walk in hospital room? 4  -HS     AM-PAC 6 Clicks Score (PT) 24  -HS     Highest Level of Mobility Goal 8 --> Walked 250 feet or more  -HS       Row Name 05/24/24 1107 05/24/24 1054       Functional Assessment    Outcome Measure Options AM-PAC 6 Clicks Daily Activity (OT)  -ES AM-PAC 6 Clicks Basic Mobility (PT) (P)   -              User Key  (r) = Recorded By, (t) = Taken By, (c) = Cosigned By      Initials Name Provider Type    Luz Tomas RN Registered Nurse    Paige Kyle, RN Registered Nurse    Kady Nogueira, OTR/L, CSRS Occupational Therapist    Clive Mancini, PT Student PT Student                      OT Recommendation and Plan  Therapy Frequency (OT): evaluation only  Plan of Care Review  Plan of Care Reviewed With: patient, daughter  Outcome Evaluation: Patient presents at or near baseline functional status at time of evaluation with no identified functional deficits that impede patient independence with activities of daily living.  No indicated need for skilled occupational therapy intervention in the acute care setting.  Occupational therapy will sign off at this time.     Time Calculation:   Evaluation Complexity (OT)  Review Occupational Profile/Medical/Therapy History Complexity: brief/low complexity  Assessment, Occupational Performance/Identification of Deficit Complexity: 1-3 performance deficits  Clinical Decision Making Complexity (OT): problem focused assessment/low complexity  Overall Complexity of Evaluation (OT): low complexity     Time Calculation- OT       Row Name 05/24/24 1101             Time Calculation- OT    OT Received On 05/24/24  -ES         Untimed Charges     OT Eval/Re-eval Minutes 18  -ES         Total Minutes    Untimed Charges Total Minutes 18  -ES       Total Minutes 18  -ES                User Key  (r) = Recorded By, (t) = Taken By, (c) = Cosigned By      Initials Name Provider Type    Kady Nogueira OTR/L, CSRS Occupational Therapist                  Therapy Charges for Today       Code Description Service Date Service Provider Modifiers Qty    [id number]  OT EVAL LOW COMPLEXITY 2 5/24/2024 Kady Garcia OTR/L, CSRS GO 1                 DELGADO Garcia/L, CSRS  5/24/2024

## 2024-05-24 NOTE — PLAN OF CARE
Goal Outcome Evaluation:  Plan of Care Reviewed With: (P) patient           Outcome Evaluation: (P) Patient presents with no strength or ROM deficits of the LEs. Skilled PT not needed at this time. He is safe to return home when cleared.      Anticipated Discharge Disposition (PT): (P) home

## 2024-05-24 NOTE — H&P
[facility]   HISTORY AND PHYSICAL    Patient Name: Timothy R Spurling  : 1955  MRN: [id number]  Primary Care Physician:  Eda Gu APRN  Date of admission: 2024    Subjective   Subjective     Chief Complaint: Left-sided weakness and slurred speech.    HPI:    Timothy R Spurling is a 68 y.o. male with past medical history of hypertension, hyperlipidemia, DVT on Eliquis, CHF, cardiomyopathy, LBBB, and GERD presented to ED with complaints of left-sided weakness and slurred speech.  Patient states that since yesterday he has not been feeling well with some lightheadedness and left-sided chest discomfort that radiated to his left arm.  When he woke up this morning his symptoms persisted but then he had sudden onset left-sided weakness with slurred speech and tongue and lip paresthesia.  Patient denies any history of prior CVA or MIs in the past.  Due to concerns he was brought to the ED for further evaluation.  In the ED patient's vitals were all within normal limits on arrival.  Labs were all unremarkable going to including a negative troponin, normal magnesium, and negative UA.  EKG shows sinus rhythm with left bundle branch block.  CT of the head was negative for any acute findings and CTA of the head and neck was negative for any significant stenosis.  When asked she denied any recent fevers, chills, headaches, palpitation, shortness of breath, cough, abdominal pain, nausea, vomiting, diarrhea, constipation, dysuria, hematuria, hematochezia, melena, or anxiety.  Patient admitted for further evaluation and treatment.      Review of Systems   All systems were reviewed and negative except for: As per HPI    Personal History     Past Medical History:   Diagnosis Date    Arthritis     Cardiomyopathy     CHF (congestive heart failure)     Chronic deep vein thrombosis (DVT) of lower extremity 2022    Gastric ulcer     HL (hearing loss)     Hyperlipemia 2022    Hypertension 2022     Hypoglycemia     Low back pain     Long time ago    Pancreatitis        Past Surgical History:   Procedure Laterality Date    CARDIAC CATHETERIZATION      CHOLECYSTECTOMY      COLONOSCOPY      COLONOSCOPY N/A 05/26/2022    Procedure: COLONOSCOPY WITH BIOPSY;  Surgeon: Teddy Gupta MD;  Location: [facility] ENDOSCOPY;  Service: Gastroenterology;  Laterality: N/A;  RECTAL EROSIONS    ERCP      FRACTURE SURGERY      JOINT REPLACEMENT      PANCREAS SURGERY  [phone]    stents    STOMACH SURGERY         Family History: family history includes Arthritis in his mother; Clotting disorder in his father; Emphysema in his mother; Lung cancer in his brother and father; Stroke in his father. Otherwise pertinent FHx was reviewed and not pertinent to current issue.    Social History:  reports that he has never smoked. He has never used smokeless tobacco. He reports that he does not currently use alcohol. He reports that he does not use drugs.    Home Medications:  FreeStyle Evelio 3 Sensor, HYDROcodone-acetaminophen, acarbose, amitriptyline, apixaban, cholecalciferol, losartan, and multivitamin with minerals      Allergies:  No Known Allergies    Objective   Objective     Vitals:   Heart Rate:  [62-76] 62  Resp:  [18] 18  BP: (95)/(59) 95/59  Physical Exam    Constitutional: Awake, alert   Eyes: PERRLA, sclerae anicteric, no conjunctival injection   HENT: NCAT, mucous membranes moist   Neck: Supple, no thyromegaly, no lymphadenopathy, trachea midline   Respiratory: Clear to auscultation bilaterally, nonlabored respirations    Cardiovascular: RRR, no murmurs, rubs, or gallops, palpable pedal pulses bilaterally   Gastrointestinal: Positive bowel sounds, soft, nontender, nondistended   Musculoskeletal: No bilateral ankle edema, no clubbing or cyanosis to extremities   Psychiatric: Appropriate affect, cooperative   Neurologic: Oriented x 3, strength symmetric in all extremities, Cranial Nerves grossly intact to confrontation,  speech clear   Skin: No rashes     Result Review    Result Review:  I have personally reviewed the results from the time of this admission to 5/23/2024 22:47 EDT and agree with these findings:  [x]  Laboratory list / accordion  []  Microbiology  [x]  Radiology  [x]  EKG/Telemetry   []  Cardiology/Vascular   []  Pathology  []  Old records  []  Other:  Most notable findings include: EKG with sinus rhythm and left bundle branch block, labs unremarkable, CT head negative, CTA of the head and neck negative for any significant stenosis      Assessment & Plan   Assessment / Plan     Brief Patient Summary:  Timothy R Spurling is a 68 y.o. male with past medical history of hypertension, hyperlipidemia, DVT on Eliquis, CHF, cardiomyopathy, LBBB, and GERD presented to ED with complaints of left-sided weakness and slurred speech.    Active Hospital Problems:  Active Hospital Problems    Diagnosis     **TIA (transient ischemic attack)     Chest pain, atypical     Atrial fibrillation     Hypertension     Hyperlipemia     Chronic deep vein thrombosis (DVT) of lower extremity      Plan:     TIA/CVA Rule out  -Admit to telemetry   -Symptoms resolved when seen  -Patient with history of hypoglycemic episodes  -CT of the head negative for any acute findings  -CT of the head and neck negative for any significant stenosis  -MRI ordered and pending  -Neurochecks  -Bedside swallow, diet if passed  -Echo with bubble study  -PT OT  -Aspirin statin  -Neurology consulted  -Supportive care    Chest pain  -Resolved when seen  -Hx CHF  -EKG reviewed, repeat in the a.m.  -Troponin negative, will trend  -Chest x-ray reviewed  -Lipid panel  -Echo as above  -Consult cardiology    HTN  -Currently well controlled  -PRN BP meds  -Resume home meds when available  -Titrate if needed     Hx DVT  -Resume Eliquis    Hx CHF  Hx hypoglycemia    GI ppx    CODE STATUS:    Level Of Support Discussed With: Patient  Code Status (Patient has no pulse and is not  breathing): CPR (Attempt to Resuscitate)  Medical Interventions (Patient has pulse or is breathing): Full Support    Admission Status:  I believe this patient meets observation status.      Electronically signed by Sebastien Burns MD, 05/23/24, 10:47 PM EDT.

## 2024-05-24 NOTE — CONSULTS
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 Dysarthria has resolved. NIHSS is zero. CT head demonstrates no cerebral ischemic injury. The patient isn't a thrombolytic nor thrombectomy candidate. Recommend cardiac and stroke work up per protocol. Optimize medical comorbidities. Neurology will follow.       Our recommendations are outlined below.    Recommendations:          Stroke/Telemetry Floor        Neuro Checks        Bedside Swallow Eval        DVT Prophylaxis        IV Fluids, Normal Saline        Head of Bed 30 Degrees        Euglycemia and Avoid Hyperthermia (PRN Acetaminophen)    Recommended Scan:       MRI Head Without Contrast       Routine CTA Head and neck    Lipid Panel to Be Obtained, if Not Done in the Last 30 Days    Therapies:        Physical Therapy, Occupational Therapy, Speech Therapy Assessment When Applicable    Dysphagia:        Swallow Evaluation, Bedside        NPO Until Swallow Evaluation    Disposition:        Neurology Follow Up Recommended    Sign Out:        Discussed with Emergency Department Provider        ------------------------------------------------------------------------------    Advanced Imaging:  Advanced Imaging Deferred because:    Obtain on this admission      Metrics:  Last Known Well: 05/23/2024 20:00:00  [facility] Notification Time: 05/23/2024 21:08:19  Arrival Time: 05/23/2024 20:50:00  Stamp Time: 05/23/2024 21:08:52  Initial Response Time: 05/23/2024 21:09:12  Symptoms: Numbness.  Initial patient  interaction: 05/23/2024 21:17:00  NIHSS Assessment Completed: 05/23/2024 21:23:04  Patient is not a candidate for Thrombolytic.  Thrombolytic Medical Decision: 05/23/2024 21:23:05  Patient was not deemed candidate for Thrombolytic because of following reasons:  Active Coagulopathy.  Use of NOAs within 48 hours.  No disabling symptoms.    CT head showed no acute hemorrhage or acute core infarct.    Primary Provider Notified of Diagnostic Impression and Management Plan on: 05/23/2024 21:31:12        ------------------------------------------------------------------------------    History of Present Illness:  Patient is a 68 year old Male.    Patient was brought by EMS for symptoms of Numbness.  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 He denies any new vision changes, dysarthria, dysphagia, nor headache. He initially was felt to have dysarthria per the daughter but has resolved.      Past Medical History:       Hypertension       Hyperlipidemia       Atrial Fibrillation       There is no history of Stroke       There is no history of Seizures  Othere PMH:  postprandial hyperglycemia; DVT; orthostatic hypotension    Medications:    Anticoagulant use:  Yes Eliquis  No Antiplatelet use  Reviewed EMR for current medications    Allergies:   NKDA    Social History:  Smoking: No  Alcohol Use: No    Family History:    There is no family history of premature cerebrovascular disease pertinent to this consultation    ROS :  14 Points Review of Systems was performed and was negative except mentioned in HPI.    Past Surgical History:  There Is No Surgical History Contributory To Today’s Visit        Examination:  BP(100/50), Pulse(66), Blood Glucose(117)  1A: Level of Consciousness - Alert; keenly responsive + 0  1B: Ask Month and Age - Both Questions Right + 0  1C: Blink Eyes & Squeeze Hands - Performs  Both Tasks + 0  2: Test Horizontal Extraocular Movements - Normal + 0  3: Test Visual Fields - No Visual Loss + 0  4: Test Facial Palsy (Use Grimace if Obtunded) - Normal symmetry + 0  5A: Test Left Arm Motor Drift - No Drift for 10 Seconds + 0  5B: Test Right Arm Motor Drift - No Drift for 10 Seconds + 0  6A: Test Left Leg Motor Drift - No Drift for 5 Seconds + 0  6B: Test Right Leg Motor Drift - No Drift for 5 Seconds + 0  7: Test Limb Ataxia (FNF/Heel-Shin) - No Ataxia + 0  8: Test Sensation - Normal; No sensory loss + 0  9: Test Language/Aphasia - Normal; No aphasia + 0  10: Test Dysarthria - Normal + 0  11: Test Extinction/Inattention - No abnormality + 0    NIHSS Score: 0    Pre-Morbid Modified [place] Scale:  0 Points = No symptoms at all    Spoke with : Dr. Pantoja    Patient/Family was informed the Neurology Consult would occur via TeleHealth consult by way of interactive audio and video telecommunications and consented to receiving care in this manner.      Patient is being evaluated for possible acute neurologic impairment and high probability of imminent or life-threatening deterioration. I spent total of 28 minutes providing care to this patient, including time for face to face visit via telemedicine, review of medical records, imaging studies and discussion of findings with providers, the patient and/or family.      Dr Storm Melendez      [facility]  For Inpatient follow-up with [facility] physician please call [facility] [phone]. This is not an outpatient service. Post hospital discharge, please contact hospital directly.    Please do not communicate with [facility] physicians via secure chat. If you have any questions, Please contact [facility].  Please call or reconsult our service if there are any clinical or diagnostic changes.

## 2024-05-24 NOTE — THERAPY EVALUATION
"Acute Care - Physical Therapy Initial Evaluation   [facility]     Patient Name: Timothy R Spurling  : 1955  MRN: [id number]  Today's Date: 2024      Visit Dx:     ICD-10-CM ICD-9-CM   1. TIA (transient ischemic attack)  G45.9 435.9   2. Difficulty in walking  R26.2 719.7     Patient Active Problem List   Diagnosis    Hyperlipemia    Hypertension    Chronic deep vein thrombosis (DVT) of lower extremity    Chronic systolic congestive heart failure    Atrial fibrillation    Cardiomyopathy    Coagulation disorder    Degeneration of lumbosacral intervertebral disc    Derangement of knee    History of histoplasmosis    Arthritis    Primary localized osteoarthritis    Seasonal allergic rhinitis    Spondylosis without myelopathy    Encounter for screening for malignant neoplasm of colon    Neuropathy    Urinary frequency    Shortness of breath    Constipation    Orthostatic hypotension    Prediabetes    Hypoglycemia    TIA (transient ischemic attack)    Chest pain, atypical     Past Medical History:   Diagnosis Date    Arthritis     \"all over\"    Cancer     pre-cancerous areas on scalp. used chemo cream    Cardiomyopathy     CHF (congestive heart failure)     Chronic deep vein thrombosis (DVT) of lower extremity 2022    bilateral    Gastric ulcer     History of transfusion     HL (hearing loss)     Hyperlipemia 2022    Hypertension 2022    Hypoglycemia     Low back pain     Long time ago    Pancreatitis     PVC (premature ventricular contraction)      Past Surgical History:   Procedure Laterality Date    CARDIAC CATHETERIZATION      CHOLECYSTECTOMY      COLONOSCOPY      COLONOSCOPY N/A 2022    Procedure: COLONOSCOPY WITH BIOPSY;  Surgeon: Teddy Gupta MD;  Location: [facility] ENDOSCOPY;  Service: Gastroenterology;  Laterality: N/A;  RECTAL EROSIONS    ERCP      FRACTURE SURGERY      JOINT REPLACEMENT Right     right knee replacement    PANCREAS SURGERY  [id number]    stents placed and " removed    STOMACH SURGERY       PT Assessment (Last 12 Hours)       PT Evaluation and Treatment       Row Name 05/24/24 1051          Physical Therapy Time and Intention    Subjective Information no complaints (P)   -     Document Type evaluation (P)   -     Mode of Treatment individual therapy;physical therapy (P)   -     Patient Effort excellent (P)   -     Symptoms Noted During/After Treatment none (P)   -MF       Row Name 05/24/24 1051          General Information    Patient Profile Reviewed yes (P)   -     Patient Observations alert;cooperative;agree to therapy (P)   -     Prior Level of Function independent:;all household mobility;community mobility;ADL's (P)   -     Equipment Currently Used at Home none (P)   -     Existing Precautions/Restrictions no known precautions/restrictions (P)   -MF       Row Name 05/24/24 1051          Living Environment    Current Living Arrangements home (P)   -     Home Accessibility stairs to enter home (P)   -     People in Home alone (P)   -     Primary Care Provided by self (P)   -MF       Row Name 05/24/24 1051          Home Main Entrance    Number of Stairs, Main Entrance four (P)   -MF       Row Name 05/24/24 1051          Home Use of Assistive/Adaptive Equipment    Equipment Currently Used at Home none (P)   -MF       Row Name 05/24/24 1051          Pain    Pretreatment Pain Rating 0/10 - no pain (P)   -     Posttreatment Pain Rating 0/10 - no pain (P)   -MF       Row Name 05/24/24 1051          Cognition    Orientation Status (Cognition) oriented x 4 (P)   -MF       Row Name 05/24/24 1051          Range of Motion (ROM)    Range of Motion bilateral lower extremities;ROM is WNL (P)   -MF       Row Name 05/24/24 1051          Strength (Manual Muscle Testing)    Strength (Manual Muscle Testing) bilateral lower extremities;strength is WNL (P)   All 5/5  -MF       Row Name 05/24/24 1051          Bed Mobility    Comment, (Bed Mobility) No bed mobility.  Patient seated EOB on arrival. (P)   -[facility] [place] Name 05/24/24 1051          Transfers    Transfers sit-stand transfer;stand-sit transfer (P)   -[facility] [place] Name 05/24/24 1051          Sit-Stand Transfer    Sit-Stand [place] (Transfers) independent (P)   -       Row Name 05/24/24 1051          Stand-Sit Transfer    Stand-Sit [place] (Transfers) independent (P)   -MF       Row Name 05/24/24 1051          Gait/Stairs (Locomotion)    Gait/Stairs Locomotion gait/ambulation independence (P)   -     [place] Level (Gait) independent (P)   -     Patient was able to Ambulate yes (P)   -     Distance in Feet (Gait) 200 (P)   -MF       Row Name 05/24/24 1051          Balance    Balance Assessment standing dynamic balance (P)   -     Dynamic Standing Balance independent (P)   -     Position/Device Used, Standing Balance unsupported (P)   -MF       Row Name 05/24/24 1051          Plan of Care Review    Plan of Care Reviewed With patient (P)   -     Outcome Evaluation Patient presents with no strength or ROM deficits of the LEs. Skilled PT not needed at this time. He is safe to return home when cleared. (P)   -[facility] [place] Name 05/24/24 1051          Positioning and Restraints    Pre-Treatment Position in bed (P)   -     Post Treatment Position bed (P)   -MF     In Bed with family/caregiver;sitting EOB;call light within reach;encouraged to call for assist;exit alarm on (P)   -MF       Row Name 05/24/24 1051          Therapy Assessment/Plan (PT)    Criteria for Skilled Interventions Met (PT) no problems identified which require skilled intervention (P)   -     Therapy Frequency (PT) evaluation only (P)   -[facility] [place] Name 05/24/24 1051          PT Evaluation Complexity    History, PT Evaluation Complexity no personal factors and/or comorbidities (P)   -     Examination of Body Systems (PT Eval Complexity) total of 4 or more elements (P)   -     Clinical Presentation (PT Evaluation  Complexity) stable (P)   -     Clinical Decision Making (PT Evaluation Complexity) low complexity (P)   -     Overall Complexity (PT Evaluation Complexity) low complexity (P)   -       Row Name 05/24/24 1051          Therapy Plan Review/Discharge Plan (PT)    Therapy Plan Review (PT) evaluation/treatment results reviewed (P)   -               User Key  (r) = Recorded By, (t) = Taken By, (c) = Cosigned By      Initials Name Provider Type     Clive Jennings, PT Student PT Student                    Physical Therapy Education       Title: PT OT SLP Therapies (Done)       Topic: Physical Therapy (Done)       Point: Mobility training (Done)       Learning Progress Summary             Patient Acceptance, E,TB, VU by  at 5/24/2024 1055   Family Acceptance, E,TB, VU by  at 5/24/2024 1055                         Point: Precautions (Done)       Learning Progress Summary             Patient Acceptance, E,TB, VU by  at 5/24/2024 1055   Family Acceptance, E,TB, VU by  at 5/24/2024 1055                                         User Key       Initials Effective Dates Name Provider Type [facility] 04/01/24 -  Clive Jennings, PT Student PT Student PT                  PT Recommendation and Plan  Anticipated Discharge Disposition (PT): (P) home  Therapy Frequency (PT): (P) evaluation only  Plan of Care Reviewed With: (P) patient  Outcome Evaluation: (P) Patient presents with no strength or ROM deficits of the LEs. Skilled PT not needed at this time. He is safe to return home when cleared.   Outcome Measures       Row Name 05/24/24 1054             How much help from another person do you currently need...    Turning from your back to your side while in flat bed without using bedrails? 4 (P)   -MF      Moving from lying on back to sitting on the side of a flat bed without bedrails? 4 (P)   -MF      Moving to and from a bed to a chair (including a wheelchair)? 4 (P)   -MF      Standing up from a chair using your  arms (e.g., wheelchair, bedside chair)? 4 (P)   -MF      Climbing 3-5 steps with a railing? 4 (P)   -MF      To walk in hospital room? 4 (P)   -MF      AM-PAC 6 Clicks Score (PT) 24 (P)   -MF      Highest Level of Mobility Goal 8 --> Walked 250 feet or more (P)   -         Functional Assessment    Outcome Measure Options AM-PAC 6 Clicks Basic Mobility (PT) (P)   -                User Key  (r) = Recorded By, (t) = Taken By, (c) = Cosigned By      Initials Name Provider Type     Clive Jennings, PT Student PT Student                     Time Calculation:    PT Charges       Row Name 05/24/24 1051             Time Calculation    PT Received On 05/24/24 (P)   -         Untimed Charges    PT Eval/Re-eval Minutes 22 (P)   -MF         Total Minutes    Untimed Charges Total Minutes 22 (P)   -MF       Total Minutes 22 (P)   -                User Key  (r) = Recorded By, (t) = Taken By, (c) = Cosigned By      Initials Name Provider Type     Clive Jennings, PT Student PT Student                  Therapy Charges for Today       Code Description Service Date Service Provider Modifiers Qty    [id number] HC PT EVAL LOW COMPLEXITY 2 5/24/2024 Clive Jennings, PT Student GP 1            PT G-Codes  Outcome Measure Options: (P) AM-PAC 6 Clicks Basic Mobility (PT)  AM-PAC 6 Clicks Score (PT): (P) 24    ADAN Gallegos  5/24/2024

## 2024-05-24 NOTE — DISCHARGE SUMMARY
[facility]         HOSPITALIST  DISCHARGE SUMMARY    Patient Name: Timothy R Spurling  : 1955  MRN: [id number]    Date of Admission: 2024  Date of Discharge:  2024    Primary Care Physician: Eda Gu APRN    Consults       Date and Time Order Name Status Description    2024 12:22 AM Inpatient Neurology Consult Stroke      2024 10:03 PM Hospitalist (on-call MD unless specified)              Active and Resolved Hospital Problems:    Neurochanges due to hypoglycemia  Possible TIA  Hypoglycemia unawareness  Sinus bradycardia  Cardiomyopathy  Left bundle branch block  Chronic systolic CHF  Dyslipidemia  Send hypertension  History of DVT on Eliquis    Active Hospital Problems    Diagnosis POA    Hypoglycemia [E16.2] Unknown    Atrial fibrillation [I48.91] Yes    Hypertension [I10] Yes    Hyperlipemia [E78.5] Yes    Chronic deep vein thrombosis (DVT) of lower extremity [I82.509] Yes      Resolved Hospital Problems    Diagnosis POA    **TIA (transient ischemic attack) [G45.9] Yes    Chest pain, atypical [R07.89] Unknown       Hospital Course     Hospital Course:  68-year-old male with history of DVT on Eliquis, hypertension, dyslipidemia, cardiomyopathy, left bundle branch block, chronic systolic CHF, unable to utilize beta-blockers due to bradycardia, hospitalized on 2024 for chief complaint of slurred speech and left-sided weakness in the setting of hypoglycemia with unawareness after having surgery of his GI tract as a teenager and has persistent episodes of hypoglycemia since, on acarbose, stays on high carb diet, does have a home blood sugar monitor, poor nutrition driving a lot of his hypoglycemia, blood sugars corrected, symptoms improved, working diagnosis possible TIA versus neurosymptoms related to hypoglycemic episode.  Continued bradycardia during hospitalization, relatively asymptomatic, echo with EF 41 to 45%, follows closely with cardiology as  outpatient.  Troponins flat.  No further neurological changes.  Discharged in hemodynamically stable condition on aspirin and statin, to follow-up with cardiology as outpatient.  SGLT2 inhibitor not prescribed due to hypoglycemia, beta-blocker not prescribed due to bradycardia.  Discharged in hemodynamically stable condition on 5/24/2024.  Referred to neurology as outpatient.    Interval follow-up: Seen and examined on day of discharge, no acute distress, no acute major night events, no subsequent neurochanges.  Eager to go home.  Intermittent bradycardia events on telemetry, asymptomatic.        Day of Discharge     Vital Signs:  Temp:  [97.3 °F (36.3 °C)-98.9 °F (37.2 °C)] 97.7 °F (36.5 °C)  Heart Rate:  [48-76] 48  Resp:  [16-18] 18  BP: ()/(55-69) 128/56    Review of systems:  All systems reviewed negative except for weakness    Physical Exam                         Constitutional: Awake, alert              Eyes: PERRLA, sclerae anicteric, no conjunctival injection              HENT: NCAT, mucous membranes moist              Neck: Supple, no thyromegaly, no lymphadenopathy, trachea midline              Respiratory: Clear to auscultation bilaterally, nonlabored respirations               Cardiovascular: RRR, no murmurs, rubs, or gallops, palpable pedal pulses bilaterally              Gastrointestinal: Positive bowel sounds, soft, nontender, nondistended              Musculoskeletal: No bilateral ankle edema, no clubbing or cyanosis to extremities              Psychiatric: Appropriate affect, cooperative              Neurologic: Oriented x 3, strength symmetric in all extremities, Cranial Nerves grossly intact to confrontation, speech clear              Skin: No rashes           Discharge Details        Discharge Medications        New Medications        Instructions Start Date   aspirin 81 MG chewable tablet   81 mg, Oral, Daily   Start Date: May 25, 2024     atorvastatin 80 MG tablet  Commonly known as:  LIPITOR   80 mg, Oral, Nightly             Continue These Medications        Instructions Start Date   acarbose 25 MG tablet  Commonly known as: PRECOSE   25 mg, Oral, 3 Times Daily With Meals      amitriptyline 75 MG tablet  Commonly known as: ELAVIL   75 mg, Oral, Nightly PRN      apixaban 2.5 MG tablet tablet  Commonly known as: Eliquis   2.5 mg, Oral, 2 Times Daily      cholecalciferol 25 MCG (1000 UT) tablet  Commonly known as: VITAMIN D3   1,000 Units, Oral, Daily      FreeStyle Evelio 3 Sensor misc   1 each, Does not apply, Every 14 Days      HYDROcodone-acetaminophen 7.5-325 MG per tablet  Commonly known as: NORCO   7.5 tablets, Oral, 2 Times Daily PRN      losartan 25 MG tablet  Commonly known as: COZAAR   12.5 mg, Oral, Nightly      multivitamin with minerals tablet tablet   1 tablet, Oral, Daily               No Known Allergies    Discharge Disposition:  Home or Self Care    Diet:  Hospital:  Diet Order   Procedures    Diet: Cardiac; Healthy Heart (2-3 Na+); Fluid Consistency: Thin (IDDSI 0)       Discharge Activity: as tolerates      CODE STATUS:  Code Status and Medical Interventions:   Ordered at: 05/23/24 2242     Level Of Support Discussed With:    Patient     Code Status (Patient has no pulse and is not breathing):    CPR (Attempt to Resuscitate)     Medical Interventions (Patient has pulse or is breathing):    Full Support         Future Appointments   Date Time Provider Department Center   6/4/2024  9:30 AM Zaheer Duarte MD [facility]   6/5/2024 11:00 AM Eda Gu APRN [facility]   8/9/2024  2:30 PM Carlos Wilson DO [facility]   9/13/2024 10:30 AM Carlos Wilson DO [facility]   9/16/2024  8:00 AM Eda Gu APRN [facility]       Additional Instructions for the Follow-ups that You Need to Schedule       Discharge Follow-up with PCP   As directed       Currently Documented PCP:    Eda Gu APRN    PCP Phone Number:    [phone]      Follow Up Details: 3 to 7 days                Pertinent  and/or Most Recent Results     PROCEDURES:   Adult Transthoracic Echo Complete W/ Cont if Necessary Per Protocol    Result Date: 5/24/2024    Left ventricular ejection fraction appears to be 41 - 45% ( with ECHO CONTRAST).   Left ventricular diastolic function is consistent with (grade I) impaired relaxation.   The left atrial cavity is mildly dilated.     MRI Brain Without Contrast    Result Date: 5/24/2024  MRI BRAIN WO CONTRAST-  Date of Exam: 5/24/2024 6:20 AM  Indication: TIA; G45.9-Transient cerebral ischemic attack, unspecified.  Technique: Routine multiplanar multisequence MR imaging of the brain was performed without the administration of   gadolinium contrast.  Comparison Exams: CTs 5/23/2024  FINDINGS:  Diffusion weighted imaging demonstrates no acute restriction abnormality.  There is a mild degree of motion limitation on some of the imaging sequences. The midline structures of the brain are grossly unremarkable in appearance. Pituitary and sella structures and craniocervical junction are grossly unremarkable. Incidental note of moderate degenerative changes at the C2-C3 level of the cervical spine incompletely visualized.  Ventricles, cisterns and sulci appear appropriate for age. No acute intracranial hemorrhage or mass effect noted. Minimal white matter changes noted on FLAIR and T2-weighted imaging within the supratentorial brain most compatible small vessel ischemic disease in this age group.  Diminutive flow left vertebral artery with dominant flow from the right vertebral artery. Major intracranial flow voids otherwise grossly unremarkable in appearance.  Paranasal sinuses are grossly clear with minimal ethmoid air cell opacification. Small bilateral mastoid effusions noted right greater than left.  Limited imaging of the globes and orbits is grossly unremarkable in appearance       1. No acute intracranial abnormality. Specifically no  evidence of acute ischemic change  2. Minimal white matter changes and atrophy not unexpected for patient stated age   Electronically Signed By-Aydin Hayes On:5/24/2024 7:51 AM      CT Angiogram Head w AI Analysis of LVO    Result Date: 5/23/2024  CT ANGIOGRAM NECK-, CT ANGIOGRAM HEAD W AI ANALYSIS OF LVO-  (COMBINED REPORT)  Date of Exams: 5/23/2024, 9:26 P.M.  Indications: 68-year-old male w/ h/o eft-sided facial weakness & numbness; chest pain; possible acute ischemic stroke (AIS). The patient's symptoms have since resolved.  Comparison: 5/23/2024.  RAPID:  CTA imaging was analyzed using RAPID AI to enable computer assisted triage notification to rapidly detect a large vessel occlusion (LVO) and shorten notification time.  Technique: CTA exams of the head and neck were performed after the uneventful intravenous administration of 100 mL of Isovue-370 contrast agent. Reconstructed 2D coronal and sagittal images were also obtained. In addition, a 3-D volume rendered image was created for interpretation. Automated exposure control and iterative reconstruction methods were used. Please note that the contrast bolus is limited on the studies, especially with regard to opacification of the intracranial arteries, which may limit detection of luminal stenoses. There is also venous contamination on the neck CTA study, especially obscuring the innominate, proximal left subclavian, and left common carotid arteries.  Findings:    HEAD CTA: No emergent large vessel occlusion. No cerebral saccular aneurysm is identified. No hemodynamically significant arterial stenoses are seen. The left vertebral artery is dominant. The intracranial vertebrobasilar arterial system is patent. The anterior (carotid) intracranial circulation is patent. The other intracranial findings are as described in the prior head CT exam report from the same day.  NECK CTA: No emergent large vessel occlusion. No hemodynamically significant arterial  stenoses are seen. No arterial dissection is identified. The right vertebral artery is dominant. The left vertebral artery is diffusely small in caliber. There is no convincing CTA evidence for vasculitis or vasculopathy. There are degenerative changes of the imaged spine at multiple levels with mild chronic malalignment.       (COMBINED) No emergent large vessel occlusion. No hemodynamically significant arterial stenoses are seen. The studies are limited as detailed above. Please see above comments for further detail.    Please note that portions of this note were completed with a voice recognition program.   Electronically Signed By-Chris Bello MD On:5/23/2024 10:28 PM      CT Angiogram Neck    Result Date: 5/23/2024  CT ANGIOGRAM NECK-, CT ANGIOGRAM HEAD W AI ANALYSIS OF LVO-  (COMBINED REPORT)  Date of Exams: 5/23/2024, 9:26 P.M.  Indications: 68-year-old male w/ h/o eft-sided facial weakness & numbness; chest pain; possible acute ischemic stroke (AIS). The patient's symptoms have since resolved.  Comparison: 5/23/2024.  RAPID:  CTA imaging was analyzed using RAPID AI to enable computer assisted triage notification to rapidly detect a large vessel occlusion (LVO) and shorten notification time.  Technique: CTA exams of the head and neck were performed after the uneventful intravenous administration of 100 mL of Isovue-370 contrast agent. Reconstructed 2D coronal and sagittal images were also obtained. In addition, a 3-D volume rendered image was created for interpretation. Automated exposure control and iterative reconstruction methods were used. Please note that the contrast bolus is limited on the studies, especially with regard to opacification of the intracranial arteries, which may limit detection of luminal stenoses. There is also venous contamination on the neck CTA study, especially obscuring the innominate, proximal left subclavian, and left common carotid arteries.  Findings:    HEAD CTA: No emergent  large vessel occlusion. No cerebral saccular aneurysm is identified. No hemodynamically significant arterial stenoses are seen. The left vertebral artery is dominant. The intracranial vertebrobasilar arterial system is patent. The anterior (carotid) intracranial circulation is patent. The other intracranial findings are as described in the prior head CT exam report from the same day.  NECK CTA: No emergent large vessel occlusion. No hemodynamically significant arterial stenoses are seen. No arterial dissection is identified. The right vertebral artery is dominant. The left vertebral artery is diffusely small in caliber. There is no convincing CTA evidence for vasculitis or vasculopathy. There are degenerative changes of the imaged spine at multiple levels with mild chronic malalignment.       (COMBINED) No emergent large vessel occlusion. No hemodynamically significant arterial stenoses are seen. The studies are limited as detailed above. Please see above comments for further detail.    Please note that portions of this note were completed with a voice recognition program.   Electronically Signed By-Chris Bello MD On:5/23/2024 10:28 PM      CT Head Without Contrast Stroke Protocol    Result Date: 5/23/2024  CT HEAD WO CONTRAST STROKE PROTOCOL-  Date of Exam: 5/23/2024 9:10 PM  Indications: 68-year-old male w/ h/o eft-sided facial weakness & numbness; chest pain; possible acute ischemic stroke (AIS).  Comparison: None available.  Technique: Axial CT images were obtained of the head without contrast administration.  Reconstructed 2D coronal and sagittal images were also obtained. Automated exposure control and iterative construction methods were used.  FINDINGS: A routine nonenhanced head CT was performed. No acute brain abnormality is seen. No acute infarct. No acute intracranial hemorrhage. No midline shift or acute intracranial mass effect is seen. The ventricular size and configuration are within normal limits.  No acute skull fracture is identified. 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 There is slight motion artifact on the study. Mild diffuse prominence of the extra-axial spaces is noted. Minimal arterial calcification is seen. Benign external auditory canal debris is suspected. Mild age-indeterminate mucosal thickening and/or retained secretions involve(s) the imaged paranasal sinuses, especially the left posterior ethmoid air cells.  No air-fluid interfaces are seen within the imaged paranasal sinuses. No acute orbital findings are suggested.      No acute brain abnormality is seen. No acute intracranial hemorrhage. No definite acute infarct.     Please note that portions of this note were completed with a voice recognition program.     Electronically Signed By-Chris Bello MD On:5/23/2024 9:20 PM      XR Chest 1 View    Result Date: 5/23/2024  XR CHEST 1 VW-  Date of Exam: 5/23/2024 8:55 PM  Indication: Weak/Dizzy/AMS triage protocol  Comparison: 1/10/2017  Findings: Heart size and pulmonary vessels are within normal limits. Lungs are clear. No pleural effusion. No pneumothorax. Bony structures are unremarkable.      Impression:  1. No acute cardiopulmonary disease.   Electronically Signed By-Migel Hylton MD On:5/23/2024 9:11 PM         LAB RESULTS:      Lab 05/23/24 2113   WBC 5.40   HEMOGLOBIN 13.0   HEMATOCRIT 38.4   PLATELETS 142   NEUTROS ABS 4.24   IMMATURE GRANS (ABS) 0.01   LYMPHS ABS 0.73   MONOS ABS 0.37   EOS ABS 0.04   MCV 94.8   PROTIME 15.0*   APTT 31.7         Lab 05/23/24 2113   SODIUM 142   POTASSIUM 4.1   CHLORIDE 106   CO2 27.2   ANION GAP 8.8   BUN 13   CREATININE 1.05   EGFR 77.8   GLUCOSE 105*   CALCIUM 8.8   MAGNESIUM 2.0   HEMOGLOBIN A1C 5.20   TSH 2.750         Lab 05/23/24 2113   TOTAL PROTEIN 6.2   ALBUMIN 4.0   GLOBULIN 2.2   ALT (SGPT) 13   AST (SGOT) 18   BILIRUBIN 0.5   ALK PHOS 57         Lab 05/24/24  0556 05/24/24  0409 05/23/24 2113   HSTROP T 9 9 10   PROTIME  --   --   15.0*   INR  --   --  1.15         Lab 05/23/24 2113   CHOLESTEROL 133   LDL CHOL 47   HDL CHOL 66*   TRIGLYCERIDES 112         Lab 05/23/24  2309 05/23/24 2113   ABO TYPING A A   RH TYPING Positive Positive   ANTIBODY SCREEN  --  Negative         Brief Urine Lab Results  (Last result in the past 365 days)        Color   Clarity   Blood   Leuk Est   Nitrite   Protein   CREAT   Urine HCG        05/23/24 2118 Yellow   Clear   Negative   Negative   Negative   Negative                 Microbiology Results (last 10 days)       ** No results found for the last 240 hours. **            MRI Brain Without Contrast    Result Date: 5/24/2024  Impression:  1. No acute intracranial abnormality. Specifically no evidence of acute ischemic change  2. Minimal white matter changes and atrophy not unexpected for patient stated age   Electronically Signed By-Aydin Hayes On:5/24/2024 7:51 AM      CT Angiogram Head w AI Analysis of LVO    Result Date: 5/23/2024  Impression: (COMBINED) No emergent large vessel occlusion. No hemodynamically significant arterial stenoses are seen. The studies are limited as detailed above. Please see above comments for further detail.    Please note that portions of this note were completed with a voice recognition program.   Electronically Signed By-Chris Bello MD On:5/23/2024 10:28 PM      CT Angiogram Neck    Result Date: 5/23/2024  Impression: (COMBINED) No emergent large vessel occlusion. No hemodynamically significant arterial stenoses are seen. The studies are limited as detailed above. Please see above comments for further detail.    Please note that portions of this note were completed with a voice recognition program.   Electronically Signed By-Chris Bello MD On:5/23/2024 10:28 PM      CT Head Without Contrast Stroke Protocol    Result Date: 5/23/2024  Impression: No acute brain abnormality is seen. No acute intracranial hemorrhage. No definite acute infarct.     Please note that  portions of this note were completed with a voice recognition program.     Electronically Signed By-Chris Bello MD On:5/23/2024 9:20 PM      XR Chest 1 View    Result Date: 5/23/2024  Impression: Impression:  1. No acute cardiopulmonary disease.   Electronically Signed By-Migel Hylton MD On:5/23/2024 9:11 PM               Results for orders placed during the hospital encounter of 05/23/24    Adult Transthoracic Echo Complete W/ Cont if Necessary Per Protocol    Interpretation Summary    Left ventricular ejection fraction appears to be 41 - 45% ( with ECHO CONTRAST).    Left ventricular diastolic function is consistent with (grade I) impaired relaxation.    The left atrial cavity is mildly dilated.      Labs Pending at Discharge:        Time spent on Discharge including face to face service:  35 minutes    Electronically signed by Melissa Hodgson MD, 05/24/24, 2:43 PM EDT.    Portions of this documentation were transcribed electronically from a voice recognition software.  I confirm all data accurately represents the service(s) I performed at today's visit.

## 2024-05-24 NOTE — ED NOTES
Patient presents via [facility] from home with c/c of Weakness, Left chest pain, left arm pain that started yesterday at 1500.     Patient has been working outside all week in the heat.   Patient has a history of cardiomyopathy.    Patients POA states approx 1 hour ago patient was winded, slurred speech, left sided arm weakness, with left facial numbness.

## 2024-05-24 NOTE — OUTREACH NOTE
Prep Survey      Flowsheet Row Responses   [facility] patient discharged from? [place]   Is LACE score < 7 ? Yes   Eligibility [facility] [place]   Date of Admission 05/23/24   Date of Discharge 05/24/24   Discharge Disposition Home or Self Care   Discharge diagnosis TIA (transient ischemic attack)   Does the patient have one of the following disease processes/diagnoses(primary or secondary)? Stroke   Does the patient have Home health ordered? No   Is there a DME ordered? No   Prep survey completed? Yes            Justina ODOM - Registered Nurse

## 2024-05-24 NOTE — PLAN OF CARE
Goal Outcome Evaluation:                      FUNCTIONAL ASSESSMENT INSTRUMENT: Patient currently scored a level 7 of 7 on Functional Communication Measures for swallowing indicating a 0% limitation in function.     ASSESSMENT/ PLAN OF CARE:  Pt presents with functional oropharyngeal swallow.  No clinical signs or symptoms of aspiration noted      REHAB POTENTIAL:  Pt has good rehab potential.  The following limitations may influence improvement/ length of tx medical status.     RECOMMENDATIONS:   1.   DIET: Regular diet-thin liquids     2.  POSITION: 90 degrees upright     3.  COMPENSATORY STRATEGIES: General swallow precautions     No further dysphagia therapy is indicated at this time.  Available for reconsult should medical status warrant.     Pt/responsible party agrees with plan of care and has been informed of all alternatives, risks and benefits.

## 2024-05-24 NOTE — PLAN OF CARE
Goal Outcome Evaluation:  Plan of Care Reviewed With: patient, daughter           Outcome Evaluation: Patient presents at or near baseline functional status at time of evaluation with no identified functional deficits that impede patient independence with activities of daily living.  No indicated need for skilled occupational therapy intervention in the acute care setting.  Occupational therapy will sign off at this time.      Anticipated Discharge Disposition (OT): home

## 2024-05-24 NOTE — ED PROVIDER NOTES
"Time: 9:05 PM EDT  Date of encounter:  5/23/2024  Independent Historian/Clinical History and Information was obtained by:   Patient    History is limited by:  Mild dysarthria    Chief Complaint: Weakness      History of Present Illness:  Patient is a 68 y.o. year old male who presents to the emergency department for evaluation of patient states he is felt abnormal with some left-sided chest pain and left arm pain since yesterday but then today developed left-sided weakness and slurred speech approximately 1 hour prior to arrival.  He states his weakness has improved he still has mild speech difficulty.  He states that this is new.  He takes Eliquis for history of DVT.  He complains of mild vertigo sensation.  He denies any numbness.    HPI    Patient Care Team  Primary Care Provider: Eda Gu APRN    Past Medical History:     No Known Allergies  Past Medical History:   Diagnosis Date    Arthritis     \"all over\"    Cancer     pre-cancerous areas on scalp. used chemo cream    Cardiomyopathy     CHF (congestive heart failure)     Chronic deep vein thrombosis (DVT) of lower extremity 01/05/2022    bilateral    Gastric ulcer     History of transfusion     HL (hearing loss)     Hyperlipemia 01/05/2022    Hypertension 01/05/2022    Hypoglycemia     Low back pain     Long time ago    Pancreatitis     PVC (premature ventricular contraction)      Past Surgical History:   Procedure Laterality Date    CARDIAC CATHETERIZATION      CHOLECYSTECTOMY      COLONOSCOPY      COLONOSCOPY N/A 05/26/2022    Procedure: COLONOSCOPY WITH BIOPSY;  Surgeon: Teddy Gupta MD;  Location: [facility];  Service: Gastroenterology;  Laterality: N/A;  RECTAL EROSIONS    ERCP      FRACTURE SURGERY      JOINT REPLACEMENT Right     right knee replacement    PANCREAS SURGERY  [id number]    stents placed and removed    STOMACH SURGERY       Family History   Problem Relation Age of Onset    Emphysema Mother     Arthritis Mother     " Clotting disorder Father     Lung cancer Father     Stroke Father     Lung cancer Brother     Colon cancer Neg Hx        Home Medications:  Prior to Admission medications    Medication Sig Start Date End Date Taking? Authorizing Provider   acarbose (PRECOSE) 25 MG tablet Take 1 tablet by mouth 3 (Three) Times a Day With Meals. 3/22/24 3/22/25  Carlos Wilson DO   amitriptyline (ELAVIL) 75 MG tablet 1 tablet Daily.    ProviderRay MD   apixaban (Eliquis) 2.5 MG tablet tablet Take 1 tablet by mouth 2 (Two) Times a Day. 2/8/23   Jason Peck MD   cholecalciferol (Vitamin D) 25 MCG (1000 UT) tablet Take 1 tablet by mouth Daily. 4/13/24   Eda Gu APRN   Continuous Blood Gluc Sensor (FreeStyle Evelio 3 Sensor) [facility] Use 1 each Every 14 (Fourteen) Days. 3/22/24   Carlos Wilson,    HYDROcodone-acetaminophen (NORCO) 7.5-325 MG per tablet 7.5 tablets Every 12 (Twelve) Hours.    ProviderRay MD   losartan (COZAAR) 25 MG tablet Take 0.5 tablets by mouth Every Night. 3/28/24   Vidhya Reynolds APRN   multivitamin with minerals tablet tablet Take 1 tablet by mouth Daily.    ProviderRay MD        Social History:   Social History     Tobacco Use    Smoking status: Never    Smokeless tobacco: Never   Vaping Use    Vaping status: Never Used   Substance Use Topics    Alcohol use: Not Currently     Comment: Beer on rare occasion    Drug use: Never         Review of Systems:  Review of Systems   Constitutional:  Negative for chills and fever.   HENT:  Negative for congestion, ear pain and sore throat.    Eyes:  Negative for pain.   Respiratory:  Negative for cough, chest tightness and shortness of breath.    Cardiovascular:  Negative for chest pain.   Gastrointestinal:  Negative for abdominal pain, diarrhea, nausea and vomiting.   Genitourinary:  Negative for flank pain and hematuria.   Musculoskeletal:  Negative for joint swelling.   Skin:  Negative for pallor.   Neurological:   "Positive for dizziness, speech difficulty and weakness. Negative for seizures and headaches.   All other systems reviewed and are negative.       Physical Exam:  /64   Pulse 57   Temp 97.7 °F (36.5 °C) (Oral)   Resp 18   Ht 193 cm (76\")   Wt 87.1 kg (192 lb 0.3 oz)   SpO2 99%   BMI 23.37 kg/m²     Physical Exam  Vitals and nursing note reviewed.   Constitutional:       General: He is not in acute distress.     Appearance: Normal appearance. He is not toxic-appearing.   HENT:      Head: Normocephalic and atraumatic.      Jaw: There is normal jaw occlusion.   Eyes:      General: Lids are normal.      Extraocular Movements: Extraocular movements intact.      Conjunctiva/sclera: Conjunctivae normal.      Pupils: Pupils are equal, round, and reactive to light.   Cardiovascular:      Rate and Rhythm: Normal rate and regular rhythm.      Pulses: Normal pulses.      Heart sounds: Normal heart sounds.   Pulmonary:      Effort: Pulmonary effort is normal. No respiratory distress.      Breath sounds: Normal breath sounds. No wheezing or rhonchi.   Abdominal:      General: Abdomen is flat.      Palpations: Abdomen is soft.      Tenderness: There is no abdominal tenderness. There is no guarding or rebound.   Musculoskeletal:         General: Normal range of motion.      Cervical back: Normal range of motion and neck supple.      Right lower leg: No edema.      Left lower leg: No edema.   Skin:     General: Skin is warm and dry.   Neurological:      Mental Status: He is alert and oriented to person, place, and time. Mental status is at baseline.      Comments: NIHSS = 2  1 point mild dysarthria   1 point mild facial weakness of the left   Psychiatric:         Mood and Affect: Mood normal.                Procedures:  Procedures      Medical Decision Making:      Comorbidities that affect care:    Hyperlipidemia, hypertension, DVT, peptic ulcer disease, cardiomyopathy, CHF    External Notes reviewed:    Previous Clinic " Note: Outpatient wellness exam April 2024      The following orders were placed and all results were independently analyzed by me:  Orders Placed This Encounter   Procedures    XR Chest 1 View    CT Head Without Contrast Stroke Protocol    CT Angiogram Head w AI Analysis of LVO    CT Angiogram Neck    MRI Brain Without Contrast    [place] Draw    Comprehensive Metabolic Panel    Single High Sensitivity Troponin T    Magnesium    Urinalysis With Microscopic If Indicated (No Culture) - Urine, Clean Catch    CBC Auto Differential    Protime-INR    aPTT    Hemoglobin A1c    Lipid Panel    High Sensitivity Troponin T    TSH Rfx On Abnormal To Free T4    High Sensitivity Troponin T 2Hr    Diet: Cardiac; Healthy Heart (2-3 Na+); Fluid Consistency: Thin (IDDSI 0)    Undress & Gown    Vital Signs    Orthostatic Blood Pressure    Initiate Department's Acute Stroke Process (Team D, Code 19, etc)    Perform NIH Stroke Scale    Measure Actual Weight    Head of Bed 30 Degrees or Less    Undress and Gown    Vital Signs    Neuro Checks    Notify Provider for SBP <80 or >200    Notify Provider for SBP >140 (For Hemorrhagic Stroke)    No Hypotonic Fluids    Nursing Dysphagia Screening (Complete Prior to Giving anything PO)    RN to Place Order SLP Consult (IF swallow screen failed) - Eval & Treat Choosing Reason of RN Dysphagia Screen Failed    Vital Signs    Continuous Pulse Oximetry    Telemetry - Place Orders & Notify Provider of Results When Patient Experiences Acute Chest Pain, Dysrhythmia or Respiratory Distress    Notify Provider    Nursing Dysphagia Screening (Complete Prior to Giving Anything By Mouth)    RN to Place Order SLP Consult - Eval & Treat Choosing Reason of RN Dysphagia Screen Failed    Nurse to Call MD or Nutrition Services for Diet if Patient Passes Dysphagia Screen    Intake and Output    Neuro Checks    NIHSS Assessment    Order CT Head Without Contrast for Neurological Decline    Provide Stroke Education  Material    Saline Lock & Maintain IV Access    Vital Signs    Intake & Output    Weigh Patient    Activity - Strict Bed Rest with HOB Flat    Code Status and Medical Interventions:    Hospitalist (on-call MD unless specified)    Inpatient Case Management  Consult    Inpatient Diabetes Educator Consult    Inpatient Neurology Consult Stroke    OT Consult: Eval & Treat    PT Consult: Eval & Treat As Tolerated    Oxygen Therapy- Nasal Cannula; Titrate 1-6 LPM Per SpO2; 90 - 95%    Oxygen Therapy- Nasal Cannula; Titrate 1-6 LPM Per SpO2; 90 - 95%    SLP Consult: Eval & Treat Other; Stroke protocol    POC Glucose Once    POC Glucose Once    POC Glucose Q6H    POC Glucose Once    ECG 12 Lead ED Triage Standing Order; Weak / Dizzy / AMS    ECG 12 Lead Syncope    Adult Transthoracic Echo Complete W/ Cont if Necessary Per Protocol    Type & Screen    ABO RH Specimen Verification    Insert Peripheral IV    Insert Large Bore Peripheral IV - Right AC Preferred    Insert Peripheral IV    Initiate Observation Status    Fall Precautions    CBC & Differential    Green Top (Gel)    Lavender Top    Gold Top - SST    Light Blue Top    Extra Tubes    Gray Top       Medications Given in the Emergency Department:  Medications   sodium chloride 0.9 % flush 10 mL (has no administration in time range)   sodium chloride 0.9 % flush 10 mL (has no administration in time range)   sodium chloride 0.9 % flush 10 mL (10 mL Intravenous Given 5/24/24 0112)   sodium chloride 0.9 % flush 10 mL (has no administration in time range)   sodium chloride 0.9 % infusion 40 mL (has no administration in time range)   atorvastatin (LIPITOR) tablet 80 mg (80 mg Oral Given 5/24/24 0111)   aspirin chewable tablet 81 mg (has no administration in time range)     Or   aspirin suppository 300 mg (has no administration in time range)   sennosides-docusate (PERICOLACE) 8.6-50 MG per tablet 2 tablet (has no administration in time range)     And    polyethylene glycol (MIRALAX) packet 17 g (has no administration in time range)     And   bisacodyl (DULCOLAX) EC tablet 5 mg (has no administration in time range)     And   bisacodyl (DULCOLAX) suppository 10 mg (has no administration in time range)   ondansetron (ZOFRAN) injection 4 mg (has no administration in time range)   acarbose (PRECOSE) tablet 25 mg (has no administration in time range)   amitriptyline (ELAVIL) tablet 75 mg (has no administration in time range)   apixaban (ELIQUIS) tablet 2.5 mg (has no administration in time range)   HYDROcodone-acetaminophen (NORCO) 7.5-325 MG per tablet 7.5 tablet (has no administration in time range)   losartan (COZAAR) tablet 12.5 mg (has no administration in time range)   iopamidol (ISOVUE-370) 76 % injection 100 mL (100 mL Intravenous Given 5/23/24 2131)   sodium chloride 0.9 % bolus 1,000 mL (1,000 mL Intravenous New Bag 5/23/24 2227)        ED Course:    ED Course as of 05/24/24 0754   u May 23, 2024   2132 I discussed patient's workup and presentation with the teleneurologist who recommends admission for TIA/stroke workup. the patient is not a thrombolytic candidate or an interventional candidate based on Eliquis use and no large vessel occlusion. [JS]   2140 My interpretation of EKG: Sinus rhythm 62, left bundle branch block, unchanged from previous of 2017 [JS]   2230 I discussed patient's workup and presentation with the hospitalist who agrees to admission.  The patient's airway is intact, vital signs, and respiratory status are safe for admission at this time. [JS]      ED Course User Index  [JS] Gregorio Pantoja MD       Labs:    Lab Results (last 24 hours)       Procedure Component Value Units Date/Time    POC Glucose Once [[id number]]  (Abnormal) Collected: 05/23/24 2107    Specimen: Blood Updated: 05/23/24 2108     Glucose 117 mg/dL      Comment: Serial Number: [id number]:  [id number]       CBC & Differential [[id number]]  (Abnormal) Collected:  05/23/24 2113    Specimen: Blood from Arm, Left Updated: 05/23/24 2132    Narrative:      The following orders were created for panel order CBC & Differential.  Procedure                               Abnormality         Status                     ---------                               -----------         ------                     CBC Auto Differential[[id number]]        Abnormal            Final result               Scan Slide[[id number]]                                                                    Please view results for these tests on the individual orders.    Comprehensive Metabolic Panel [[id number]]  (Abnormal) Collected: 05/23/24 2113    Specimen: Blood from Arm, Left Updated: 05/23/24 2146     Glucose 105 mg/dL      BUN 13 mg/dL      Creatinine 1.05 mg/dL      Sodium 142 mmol/L      Potassium 4.1 mmol/L      Chloride 106 mmol/L      CO2 27.2 mmol/L      Calcium 8.8 mg/dL      Total Protein 6.2 g/dL      Albumin 4.0 g/dL      ALT (SGPT) 13 U/L      AST (SGOT) 18 U/L      Alkaline Phosphatase 57 U/L      Total Bilirubin 0.5 mg/dL      Globulin 2.2 gm/dL      A/G Ratio 1.8 g/dL      BUN/Creatinine Ratio 12.4     Anion Gap 8.8 mmol/L      eGFR 77.8 mL/min/1.73     Narrative:      GFR Normal >60  Chronic Kidney Disease <60  Kidney Failure <15      Single High Sensitivity Troponin T [[id number]]  (Normal) Collected: 05/23/24 2113    Specimen: Blood from Arm, Left Updated: 05/23/24 2146     HS Troponin T 10 ng/L     Narrative:      High Sensitive Troponin T Reference Range:  <14.0 ng/L- Negative Female for AMI  <22.0 ng/L- Negative Male for AMI  >=14 - Abnormal Female indicating possible myocardial injury.  >=22 - Abnormal Male indicating possible myocardial injury.   Clinicians would have to utilize clinical acumen, EKG, Troponin, and serial changes to determine if it is an Acute Myocardial Infarction or myocardial injury due to an underlying chronic condition.         Magnesium [[id number]]  (Normal)  Collected: 05/23/24 2113    Specimen: Blood from Arm, Left Updated: 05/23/24 2146     Magnesium 2.0 mg/dL     CBC Auto Differential [[id number]]  (Abnormal) Collected: 05/23/24 2113    Specimen: Blood from Arm, Left Updated: 05/23/24 2132     WBC 5.40 10*3/mm3      RBC 4.05 10*6/mm3      Hemoglobin 13.0 g/dL      Hematocrit 38.4 %      MCV 94.8 fL      MCH 32.1 pg      MCHC 33.9 g/dL      RDW 12.4 %      RDW-SD 42.9 fl      MPV 9.5 fL      Platelets 142 10*3/mm3      Neutrophil % 78.5 %      Lymphocyte % 13.5 %      Monocyte % 6.9 %      Eosinophil % 0.7 %      Basophil % 0.2 %      Immature Grans % 0.2 %      Neutrophils, Absolute 4.24 10*3/mm3      Lymphocytes, Absolute 0.73 10*3/mm3      Monocytes, Absolute 0.37 10*3/mm3      Eosinophils, Absolute 0.04 10*3/mm3      Basophils, Absolute 0.01 10*3/mm3      Immature Grans, Absolute 0.01 10*3/mm3      nRBC 0.0 /100 WBC     Protime-INR [[id number]]  (Abnormal) Collected: 05/23/24 2113    Specimen: Blood from Arm, Left Updated: 05/23/24 2139     Protime 15.0 Seconds      INR 1.15    Narrative:      Suggested Therapeutic Ranges For Oral Anticoagulant Therapy:  Level of Therapy                      INR Target Range  Standard Dose                            2.0-3.0  High Dose                                2.5-3.5  Patients not receiving anticoagulant  Therapy Normal Range                     0.86-1.15    aPTT [[id number]]  (Normal) Collected: 05/23/24 2113    Specimen: Blood from Arm, Left Updated: 05/23/24 2139     PTT 31.7 seconds     Hemoglobin A1c [[id number]] Collected: 05/23/24 2113    Specimen: Blood from Arm, Left Updated: 05/24/24 0117    Lipid Panel [[id number]]  (Abnormal) Collected: 05/23/24 2113    Specimen: Blood from Arm, Left Updated: 05/24/24 0101     Total Cholesterol 133 mg/dL      Triglycerides 112 mg/dL      HDL Cholesterol 66 mg/dL      LDL Cholesterol  47 mg/dL      VLDL Cholesterol 20 mg/dL      LDL/HDL Ratio 0.68    Narrative:      Cholesterol  Reference Ranges  (U.S. Department of Health and Human Services ATP III Classifications)    Desirable          <200 mg/dL  Borderline High    200-239 mg/dL  High Risk          >240 mg/dL      Triglyceride Reference Ranges  (U.S. Department of Health and Human Services ATP III Classifications)    Normal           <150 mg/dL  Borderline High  150-199 mg/dL  High             200-499 mg/dL  Very High        >500 mg/dL    HDL Reference Ranges  (U.S. Department of Health and Human Services ATP III Classifications)    Low     <40 mg/dl (major risk factor for CHD)  High    >60 mg/dl ('negative' risk factor for CHD)        LDL Reference Ranges  (U.S. Department of Health and Human Services ATP III Classifications)    Optimal          <100 mg/dL  Near Optimal     100-129 mg/dL  Borderline High  130-159 mg/dL  High             160-189 mg/dL  Very High        >189 mg/dL    TSH Rfx On Abnormal To Free T4 [[id number]]  (Normal) Collected: 05/23/24 2113    Specimen: Blood from Arm, Left Updated: 05/24/24 0227     TSH 2.750 uIU/mL     Urinalysis With Microscopic If Indicated (No Culture) - Urine, Clean Catch [[id number]]  (Normal) Collected: 05/23/24 2118    Specimen: Urine, Clean Catch Updated: 05/23/24 2132     Color, UA Yellow     Appearance, UA Clear     pH, UA 6.0     Specific Gravity, UA 1.007     Glucose, UA Negative     Ketones, UA Negative     Bilirubin, UA Negative     Blood, UA Negative     Protein, UA Negative     Leuk Esterase, UA Negative     Nitrite, UA Negative     Urobilinogen, UA 1.0 E.U./dL    Narrative:      Urine microscopic not indicated.    High Sensitivity Troponin T [[id number]]  (Normal) Collected: 05/24/24 0409    Specimen: Blood from Arm, Right Updated: 05/24/24 0501     HS Troponin T 9 ng/L     Narrative:      High Sensitive Troponin T Reference Range:  <14.0 ng/L- Negative Female for AMI  <22.0 ng/L- Negative Male for AMI  >=14 - Abnormal Female indicating possible myocardial injury.  >=22 - Abnormal Male  indicating possible myocardial injury.   Clinicians would have to utilize clinical acumen, EKG, Troponin, and serial changes to determine if it is an Acute Myocardial Infarction or myocardial injury due to an underlying chronic condition.         High Sensitivity Troponin T 2Hr [[id number]]  (Normal) Collected: 05/24/24 0556    Specimen: Blood from Arm, Right Updated: 05/24/24 0624     HS Troponin T 9 ng/L      Troponin T Delta 0 ng/L     Narrative:      High Sensitive Troponin T Reference Range:  <14.0 ng/L- Negative Female for AMI  <22.0 ng/L- Negative Male for AMI  >=14 - Abnormal Female indicating possible myocardial injury.  >=22 - Abnormal Male indicating possible myocardial injury.   Clinicians would have to utilize clinical acumen, EKG, Troponin, and serial changes to determine if it is an Acute Myocardial Infarction or myocardial injury due to an underlying chronic condition.         POC Glucose Once [[id number]]  (Normal) Collected: 05/24/24 0606    Specimen: Blood Updated: 05/24/24 0724     Glucose 91 mg/dL      Comment: Serial Number: [id number]:  [id number]                Imaging:    MRI Brain Without Contrast    Result Date: 5/24/2024  MRI BRAIN WO CONTRAST-  Date of Exam: 5/24/2024 6:20 AM  Indication: TIA; G45.9-Transient cerebral ischemic attack, unspecified.  Technique: Routine multiplanar multisequence MR imaging of the brain was performed without the administration of   gadolinium contrast.  Comparison Exams: CTs 5/23/2024  FINDINGS:  Diffusion weighted imaging demonstrates no acute restriction abnormality.  There is a mild degree of motion limitation on some of the imaging sequences. The midline structures of the brain are grossly unremarkable in appearance. Pituitary and sella structures and craniocervical junction are grossly unremarkable. Incidental note of moderate degenerative changes at the C2-C3 level of the cervical spine incompletely visualized.  Ventricles, cisterns and sulci  appear appropriate for age. No acute intracranial hemorrhage or mass effect noted. Minimal white matter changes noted on FLAIR and T2-weighted imaging within the supratentorial brain most compatible small vessel ischemic disease in this age group.  Diminutive flow left vertebral artery with dominant flow from the right vertebral artery. Major intracranial flow voids otherwise grossly unremarkable in appearance.  Paranasal sinuses are grossly clear with minimal ethmoid air cell opacification. Small bilateral mastoid effusions noted right greater than left.  Limited imaging of the globes and orbits is grossly unremarkable in appearance       1. No acute intracranial abnormality. Specifically no evidence of acute ischemic change  2. Minimal white matter changes and atrophy not unexpected for patient stated age   Electronically Signed By-Aydin Hayes On:5/24/2024 7:51 AM      CT Angiogram Head w AI Analysis of LVO, CT Angiogram Neck    Result Date: 5/23/2024  CT ANGIOGRAM NECK-, CT ANGIOGRAM HEAD W AI ANALYSIS OF LVO-  (COMBINED REPORT)  Date of Exams: 5/23/2024, 9:26 P.M.  Indications: 68-year-old male w/ h/o eft-sided facial weakness & numbness; chest pain; possible acute ischemic stroke (AIS). The patient's symptoms have since resolved.  Comparison: 5/23/2024.  RAPID:  CTA imaging was analyzed using RAPID AI to enable computer assisted triage notification to rapidly detect a large vessel occlusion (LVO) and shorten notification time.  Technique: CTA exams of the head and neck were performed after the uneventful intravenous administration of 100 mL of Isovue-370 contrast agent. Reconstructed 2D coronal and sagittal images were also obtained. In addition, a 3-D volume rendered image was created for interpretation. Automated exposure control and iterative reconstruction methods were used. Please note that the contrast bolus is limited on the studies, especially with regard to opacification of the intracranial  arteries, which may limit detection of luminal stenoses. There is also venous contamination on the neck CTA study, especially obscuring the innominate, proximal left subclavian, and left common carotid arteries.  Findings:    HEAD CTA: No emergent large vessel occlusion. No cerebral saccular aneurysm is identified. No hemodynamically significant arterial stenoses are seen. The left vertebral artery is dominant. The intracranial vertebrobasilar arterial system is patent. The anterior (carotid) intracranial circulation is patent. The other intracranial findings are as described in the prior head CT exam report from the same day.  NECK CTA: No emergent large vessel occlusion. No hemodynamically significant arterial stenoses are seen. No arterial dissection is identified. The right vertebral artery is dominant. The left vertebral artery is diffusely small in caliber. There is no convincing CTA evidence for vasculitis or vasculopathy. There are degenerative changes of the imaged spine at multiple levels with mild chronic malalignment.       (COMBINED) No emergent large vessel occlusion. No hemodynamically significant arterial stenoses are seen. The studies are limited as detailed above. Please see above comments for further detail.    Please note that portions of this note were completed with a voice recognition program.   Electronically Signed By-Chris Bello MD On:5/23/2024 10:28 PM      CT Head Without Contrast Stroke Protocol    Result Date: 5/23/2024  CT HEAD WO CONTRAST STROKE PROTOCOL-  Date of Exam: 5/23/2024 9:10 PM  Indications: 68-year-old male w/ h/o eft-sided facial weakness & numbness; chest pain; possible acute ischemic stroke (AIS).  Comparison: None available.  Technique: Axial CT images were obtained of the head without contrast administration.  Reconstructed 2D coronal and sagittal images were also obtained. Automated exposure control and iterative construction methods were used.  FINDINGS: A routine  nonenhanced head CT was performed. No acute brain abnormality is seen. No acute infarct. No acute intracranial hemorrhage. No midline shift or acute intracranial mass effect is seen. The ventricular size and configuration are within normal limits. No acute skull fracture is identified. 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 There is slight motion artifact on the study. Mild diffuse prominence of the extra-axial spaces is noted. Minimal arterial calcification is seen. Benign external auditory canal debris is suspected. Mild age-indeterminate mucosal thickening and/or retained secretions involve(s) the imaged paranasal sinuses, especially the left posterior ethmoid air cells.  No air-fluid interfaces are seen within the imaged paranasal sinuses. No acute orbital findings are suggested.      No acute brain abnormality is seen. No acute intracranial hemorrhage. No definite acute infarct.     Please note that portions of this note were completed with a voice recognition program.     Electronically Signed By-Chris Bello MD On:5/23/2024 9:20 PM      XR Chest 1 View    Result Date: 5/23/2024  XR CHEST 1 VW-  Date of Exam: 5/23/2024 8:55 PM  Indication: Weak/Dizzy/AMS triage protocol  Comparison: 1/10/2017  Findings: Heart size and pulmonary vessels are within normal limits. Lungs are clear. No pleural effusion. No pneumothorax. Bony structures are unremarkable.      Impression:  1. No acute cardiopulmonary disease.   Electronically Signed By-Migel Hylton MD On:5/23/2024 9:11 PM         Differential Diagnosis and Discussion:    Stroke: Differential diagnosis in this patient with signs of possible ischemic stroke include TIA or ischemic stroke, hemorrhagic stroke, hypoglycemic episode, toxic or metabolic encephalopathy, paresthesias.    All labs were reviewed and interpreted by me.  All X-rays impressions were independently interpreted by me.  EKG was interpreted by me.  CT scan radiology impression was interpreted  by me.    [facility]       Critical Care Note: Total Critical Care time of 35 minutes. Total critical care time documented does not include time spent on separately billed procedures for services of nurses or physician assistants. I personally saw and examined the patient. I have reviewed all diagnostic interpretations and treatment plans as written. I was present for the key portions of any procedures performed and the inclusive time noted in any critical care statement. Critical care time includes patient management by me, time spent at the patients bedside,  time to review lab and imaging results, discussing patient care, documentation in the medical record, and time spent with family or caregiver.        Patient Care Considerations:    MRI: I considered ordering an MRI however may be performed on emergently.      Consultants/Shared Management Plan:    Hospitalist: I have discussed the case with the hospitalist who agrees to accept the patient for admission.  Consultant: I have discussed the case with teleneurology who agrees to consult on the patient.    Social Determinants of Health:    Patient has presented with family members who are responsible, reliable and will ensure follow up care.      Disposition and Care Coordination:    Admit:   Through independent evaluation of the patient's history, physical, and imperical data, the patient meets criteria for inpatient admission to the hospital.        Final diagnoses:   TIA (transient ischemic attack)        ED Disposition       ED Disposition   Decision to Admit    Condition   --    Comment   Level of Care: Telemetry [5]   Diagnosis: TIA (transient ischemic attack) [752056]   Admitting Physician: GEORGE SANTOS [[id number]]                 This medical record created using voice recognition software.             Gregorio Pantoja MD  05/24/24 [id number]

## 2024-05-24 NOTE — PROGRESS NOTES
[facility]  [facility] TeleNeurology Consult Services    Routine Consult Follow-Up    Patient Name:   Spurling, Timothy  YOB: 1955  Identification Number:   MRN - [id number]  Date of Service:   05/24/2024 08:35:37    Diagnosis        R20.2 - Paresthesia of skin        R29.810 - Facial numbness/ Facial weakness    Impression  69 yo male with a history of hypertension, DVT on Eliquis, and left eye central blind spot at baseline presents with chest pain, facial numbness, and left arm tingling. He denies any new vision changes, dysarthria, dysphagia, nor headache. He initially was felt to have dysarthria per the daughter but has resolved.    Past Medical History:   Hypertension   Hyperlipidemia   Atrial Fibrillation  PMH: postprandial hyperglycemia; DVT; orthostatic hypotension  Anticoagulant use: Yes Eliquis    ?transient neurological symptoms in the setting of hypoglycemia, resolved  MRI brain negative for acute ischemia.    no further neuro workup indicated at this time.       Disposition :  No further recommendations  Will signoff please contact for any questions    Subjective  left side of face felt numb and tingling, speech was dysfunctional, now resolved, lasted few hours. No focal weakness. No hx of stroke or TIA. he measured his sugars at home at the time of the episode and it was 50s. Has hx post prandial hypoglycemia.  CP has resolved.  UA negative       Examination  1A: Level of Consciousness - Alert; keenly responsive + 0  1B: Ask Month and Age - Both Questions Right + 0  1C: Blink Eyes & Squeeze Hands - Performs Both Tasks + 0  2: Test Horizontal Extraocular Movements - Normal + 0  3: Test Visual Fields - No Visual Loss + 0  4: Test Facial Palsy (Use Grimace if Obtunded) - Normal symmetry + 0  5A: Test Left Arm Motor Drift - No Drift for 10 Seconds + 0  5B: Test Right Arm Motor Drift - No Drift for 10 Seconds + 0  6A: Test Left Leg Motor Drift - No Drift for 5 Seconds + 0  6B:  Test Right Leg Motor Drift - No Drift for 5 Seconds + 0  7: Test Limb Ataxia (FNF/Heel-Shin) - No Ataxia + 0  8: Test Sensation - Normal; No sensory loss + 0  9: Test Language/Aphasia - Normal; No aphasia + 0  10: Test Dysarthria - Normal + 0  11: Test Extinction/Inattention - No abnormality + 0    NIHSS Score: 0           Patient/Family was informed the Neurology Consult would happen via TeleHealth consult by way of interactive audio and video telecommunications and consented to receiving care in this manner.    Telehealth Neurology consultation was provided. I spent 15 minutes providing telehealth care. This includes time spent for face to face visit via telemedicine, review of medical records, imaging studies and discussion of findings with providers, the patient and/or family.      Dr Iban Ragsdale      [facility]  For Inpatient follow-up with [facility] physician please call [facility] [phone]. This is not an outpatient service. Post hospital discharge, please contact hospital directly.    Please do not communicate with [facility] physicians via secure chat. If you have any questions, Please contact [facility].  Please call or reconsult our service if there are any clinical or diagnostic changes.

## 2024-05-24 NOTE — THERAPY EVALUATION
"Acute Care - Speech Language Pathology   Swallow Initial Evaluation  Alin     Patient Name: Timothy R Spurling  : 1955  MRN: [id number]  Today's Date: 2024               Admit Date: 2024    Visit Dx:     ICD-10-CM ICD-9-CM   1. TIA (transient ischemic attack)  G45.9 435.9     Patient Active Problem List   Diagnosis    Hyperlipemia    Hypertension    Chronic deep vein thrombosis (DVT) of lower extremity    Chronic systolic congestive heart failure    Atrial fibrillation    Cardiomyopathy    Coagulation disorder    Degeneration of lumbosacral intervertebral disc    Derangement of knee    History of histoplasmosis    Arthritis    Primary localized osteoarthritis    Seasonal allergic rhinitis    Spondylosis without myelopathy    Encounter for screening for malignant neoplasm of colon    Neuropathy    Urinary frequency    Shortness of breath    Constipation    Orthostatic hypotension    Prediabetes    Hypoglycemia    TIA (transient ischemic attack)    Chest pain, atypical     Past Medical History:   Diagnosis Date    Arthritis     \"all over\"    Cancer     pre-cancerous areas on scalp. used chemo cream    Cardiomyopathy     CHF (congestive heart failure)     Chronic deep vein thrombosis (DVT) of lower extremity 2022    bilateral    Gastric ulcer     History of transfusion     HL (hearing loss)     Hyperlipemia 2022    Hypertension 2022    Hypoglycemia     Low back pain     Long time ago    Pancreatitis     PVC (premature ventricular contraction)      Past Surgical History:   Procedure Laterality Date    CARDIAC CATHETERIZATION      CHOLECYSTECTOMY      COLONOSCOPY      COLONOSCOPY N/A 2022    Procedure: COLONOSCOPY WITH BIOPSY;  Surgeon: Teddy Gupta MD;  Location: [facility] ENDOSCOPY;  Service: Gastroenterology;  Laterality: N/A;  RECTAL EROSIONS    ERCP      FRACTURE SURGERY      JOINT REPLACEMENT Right     right knee replacement    PANCREAS SURGERY  [id number]    stents " placed and removed    STOMACH SURGERY         Inpatient Speech Pathology Dysphagia Evaluation        PAIN SCALE: None noted    PRECAUTIONS/CONTRAINDICATIONS: None noted    SUSPECTED ABUSE/NEGLECT/EXPLOITATION: None noted    SOCIAL/PSYCHOLOGICAL NEEDS/BARRIERS: None noted    PAST SOCIAL HISTORY: Lives at home    PRIOR FUNCTION: Independent    PATIENT GOALS/EXPECTATIONS: Did not report    HISTORY: This patient is a 68-year-old admitted with the above diagnosis.    CURRENT DIET LEVEL: Regular diet-thin liquids    OBJECTIVE:    TEST ADMINISTERED: Clinical dysphagia evaluation    COGNITION/SAFETY AWARENESS: Alert and oriented    BEHAVIORAL OBSERVATIONS: Pleasant cooperative    ORAL MOTOR EXAM: Lingual and labial strength and range of motion within functional limits    VOICE QUALITY: Within normal limits    REFLEX EXAM: Deferred    POSTURE: 90 degrees upright    FEEDING/SWALLOWING FUNCTION: Assessed with full range of consistencies with thin liquids from spoon cup and straw, purée consistencies soft and hard solids    CLINICAL OBSERVATIONS: Oral stage is characterized by good bolus preparation and control.  Timely oral transit.  Pharyngeal phase is timely with good laryngeal elevation per palpation.  No clinical signs or symptoms of aspiration noted.  Vocal quality remained clear to auscultation.  Denies globus sensation after completion of swallow    DYSPHAGIA CRITERIA: N/A    FUNCTIONAL ASSESSMENT INSTRUMENT: Patient currently scored a level 7 of 7 on Functional Communication Measures for swallowing indicating a 0% limitation in function.    ASSESSMENT/ PLAN OF CARE:  Pt presents with functional oropharyngeal swallow.  No clinical signs or symptoms of aspiration noted     REHAB POTENTIAL:  Pt has good rehab potential.  The following limitations may influence improvement/ length of tx medical status.    RECOMMENDATIONS:   1.   DIET: Regular diet-thin liquids    2.  POSITION: 90 degrees upright    3.  COMPENSATORY  STRATEGIES: General swallow precautions    No further dysphagia therapy is indicated at this time.  Available for reconsult should medical status warrant.    Pt/responsible party agrees with plan of care and has been informed of all alternatives, risks and benefits.

## 2024-05-24 NOTE — PLAN OF CARE
Goal Outcome Evaluation:  Plan of Care Reviewed With: patient        Progress: no change  Outcome Evaluation: Patient NIH 0. Patient reports that his symptoms have resolved. He did state he has intermittent sharp anterior left chest pain, but it lasts less than a minute. MD aware. Call light within reach. VSS.

## 2024-05-24 NOTE — CONSULTS
Met with patient and wife at bedside. Patient state that the ED needed to remove his CGM for testing and placed it in the sharps container. We do not have extra CGMs so explained to the patient that he will have to call [employer] to get a replacement so he won't have to go through insurance. Patient understands. No further questions or needs at this time.

## 2024-05-25 LAB
QT INTERVAL: 505 MS
QTC INTERVAL: 466 MS

## 2024-05-26 ENCOUNTER — TRANSITIONAL CARE MANAGEMENT TELEPHONE ENCOUNTER (OUTPATIENT)
Dept: CALL CENTER | Facility: HOSPITAL | Age: 69
End: 2024-05-26
Payer: MEDICARE

## 2024-05-26 NOTE — OUTREACH NOTE
Call Center TCM Note      Flowsheet Row Responses   [facility] patient discharged from? [place]   Does the patient have one of the following disease processes/diagnoses(primary or secondary)? Stroke   TCM attempt successful? Yes   Call start time 1239   Call end time 1249   Meds reviewed with patient/caregiver? Yes   Is the patient having any side effects they believe may be caused by any medication additions or changes? No   Does the patient have all medications ordered at discharge? Yes   Is the patient taking all medications as directed (includes completed medication regime)? Yes   Comments PCP hospital f/u appt 06/05/24. Cardiology appt 06/04/24. [facility] neurology office will be calling him to schedule appt.   Does the patient have an appointment with their PCP within 7-14 days of discharge? Yes   Has home health visited the patient within 72 hours of discharge? N/A   DME comments Has a Freestyle BG monitor.   Psychosocial issues? No   Does the patient require any assistance with activities of daily living such as eating, bathing, dressing, walking, etc.? No   Does the patient have any residual symptoms from stroke/TIA? No   Does the patient understand the diet ordered at discharge? Yes   Did the patient receive a copy of their discharge instructions? Yes   Nursing interventions Reviewed instructions with patient   What is the patient's perception of their health status since discharge? Improving   Nursing interventions Nurse provided patient education   Is the patient/caregiver able to teach back the risk factors for a stroke? High blood pressure-goal below 120/80, High Cholesterol   Is the patient/caregiver able to teach back signs and symptoms related to disease process for when to call PCP? Yes   Is the patient/caregiver able to teach back signs and symptoms related to disease process for when to call 911? Yes   If the patient is a current smoker, are they able to teach back resources for cessation? Not  a smoker   Is the patient/caregiver able to teach back the hierarchy of who to call/visit for symptoms/problems? PCP, Specialist, Home health nurse, Urgent Care, ED, 911 Yes   Is the patient able to teach back FAST for Stroke? B alance: Watch for sudden loss of balance, E yes: Check for vision loss, F ace: Look for an uneven smile, A rm: Check if one arm is weak, S peech: Listen for slurred speech, T jules: Call 9-1-1 right away   TCM call completed? Yes   Wrap up additional comments Patient states is improving. States BG has been WNL since discharge. States monitoring BG closely. Denies any residual s/s from hospital stay. Denies any needs today. TCM complete.   Call end time 1249   Would this patient benefit from a Referral to [facility] Social Work? No   Is the patient interested in additional calls from an ambulatory ? No            Niharika Zamorano RN    5/26/2024, 12:52 EDT

## 2024-06-04 ENCOUNTER — READMISSION MANAGEMENT (OUTPATIENT)
Dept: CALL CENTER | Facility: HOSPITAL | Age: 69
End: 2024-06-04
Payer: MEDICARE

## 2024-06-04 ENCOUNTER — OFFICE VISIT (OUTPATIENT)
Dept: CARDIOLOGY | Facility: CLINIC | Age: 69
End: 2024-06-04
Payer: MEDICARE

## 2024-06-04 VITALS
WEIGHT: 198 LBS | SYSTOLIC BLOOD PRESSURE: 106 MMHG | DIASTOLIC BLOOD PRESSURE: 64 MMHG | HEART RATE: 89 BPM | BODY MASS INDEX: 24.11 KG/M2 | HEIGHT: 76 IN

## 2024-06-04 DIAGNOSIS — I10 PRIMARY HYPERTENSION: Chronic | ICD-10-CM

## 2024-06-04 DIAGNOSIS — I82.509 CHRONIC DEEP VEIN THROMBOSIS (DVT) OF LOWER EXTREMITY, UNSPECIFIED LATERALITY, UNSPECIFIED VEIN: Chronic | ICD-10-CM

## 2024-06-04 DIAGNOSIS — I50.22 CHRONIC SYSTOLIC CONGESTIVE HEART FAILURE: Primary | ICD-10-CM

## 2024-06-04 DIAGNOSIS — E78.2 MIXED HYPERLIPIDEMIA: Chronic | ICD-10-CM

## 2024-06-04 PROCEDURE — 3078F DIAST BP <80 MM HG: CPT | Performed by: INTERNAL MEDICINE

## 2024-06-04 PROCEDURE — 3074F SYST BP LT 130 MM HG: CPT | Performed by: INTERNAL MEDICINE

## 2024-06-04 PROCEDURE — 99214 OFFICE O/P EST MOD 30 MIN: CPT | Performed by: INTERNAL MEDICINE

## 2024-06-04 NOTE — OUTREACH NOTE
Stroke Week 2 Survey      Flowsheet Row Responses   [facility] patient discharged from? [place]   Does the patient have one of the following disease processes/diagnoses(primary or secondary)? Stroke   Week 2 attempt successful? Yes   Call start time 1515   Call end time 1520   Discharge diagnosis TIA (transient ischemic attack)   Is the patient taking all medications as directed (includes completed medication regime)? Yes   Has the patient kept scheduled appointments due by today? Yes   DME comments Has a Freestyle BG monitor.   Psychosocial issues? Yes   Does the patient require any assistance with activities of daily living such as eating, bathing, dressing, walking, etc.? No   Does the patient have any residual symptoms from stroke/TIA? No   Comments pt reports that he is doing well and has no residual neurological symptoms remaining. Pt reports that he has continued having some sharp chest pains, MD is aware.   What is the patient's perception of their health status since discharge? Returned to baseline/stable   Nursing interventions Nurse provided patient education   Is the patient able to teach back FAST for Stroke? B alance: Watch for sudden loss of balance, E yes: Check for vision loss, F ace: Look for an uneven smile, A rm: Check if one arm is weak, S peech: Listen for slurred speech, T jules: Call 9-1-1 right away   Is the patient/caregiver able to teach back the risk factors for a stroke? History of Afib   Is the patient/caregiver able to teach back signs and symptoms related to disease process for when to call PCP? Yes   Is the patient/caregiver able to teach back signs and symptoms related to disease process for when to call 911? Yes   Week 2 call completed? Yes   Revoked No further contact(revokes)-requires comment   Call end time 1520            Barbie AQUINO - Registered Nurse

## 2024-06-04 NOTE — PROGRESS NOTES
"Chief Complaint  Hypertension and Cardiomyopathy (6m Follow Up)    Subjective        Timothy R Spurling presents to [facility] CARDIOLOGY  History of present illness:    Patient states overall he is doing well.  He did have an episode where he went into the hospital back in May.  He was having some tingling type sensation in his face.  He also noted a poking sensation on his left chest that he can pinpoint.  He had workup including MRI of the head that was negative and multiple sets of troponins that were negative.  He was seen by a neurologist.  They felt it was due to one of his known hypoglycemic episodes.  The patient still notes a little bit of lightheadedness with standing.  He denies any significant bleeding problems or edema.      Past Medical History:   Diagnosis Date    Arthritis     \"all over\"    Cancer     pre-cancerous areas on scalp. used chemo cream    Cardiomyopathy     CHF (congestive heart failure)     Chronic deep vein thrombosis (DVT) of lower extremity 01/05/2022    bilateral    Gastric ulcer     History of transfusion     HL (hearing loss)     Hyperlipemia 01/05/2022    Hypertension 01/05/2022    Hypoglycemia     Low back pain     Long time ago    Pancreatitis     PVC (premature ventricular contraction)          Past Surgical History:   Procedure Laterality Date    CARDIAC CATHETERIZATION      CHOLECYSTECTOMY      COLONOSCOPY      COLONOSCOPY N/A 05/26/2022    Procedure: COLONOSCOPY WITH BIOPSY;  Surgeon: Teddy Gupta MD;  Location: [facility] ENDOSCOPY;  Service: Gastroenterology;  Laterality: N/A;  RECTAL EROSIONS    ERCP      FRACTURE SURGERY      JOINT REPLACEMENT Right     right knee replacement    PANCREAS SURGERY  [id number]    stents placed and removed    STOMACH SURGERY            Social History     Socioeconomic History    Marital status:    Tobacco Use    Smoking status: Never    Smokeless tobacco: Never   Vaping Use    Vaping status: Never Used " "  Substance and Sexual Activity    Alcohol use: Not Currently     Comment: Beer on rare occasion    Drug use: Never    Sexual activity: Not Currently     Partners: Female         Family History   Problem Relation Age of Onset    Emphysema Mother     Arthritis Mother     Clotting disorder Father     Lung cancer Father     Stroke Father     Lung cancer Brother     Colon cancer Neg Hx           No Known Allergies         Current Outpatient Medications:     acarbose (PRECOSE) 25 MG tablet, Take 1 tablet by mouth 3 (Three) Times a Day With Meals., Disp: 90 tablet, Rfl: 5    amitriptyline (ELAVIL) 75 MG tablet, Take 1 tablet by mouth At Night As Needed., Disp: , Rfl:     apixaban (Eliquis) 2.5 MG tablet tablet, Take 1 tablet by mouth 2 (Two) Times a Day., Disp: 180 tablet, Rfl: 3    aspirin 81 MG chewable tablet, Chew 1 tablet Daily for 30 days., Disp: 30 tablet, Rfl: 0    atorvastatin (LIPITOR) 80 MG tablet, Take 1 tablet by mouth Every Night for 30 days., Disp: 30 tablet, Rfl: 0    cholecalciferol (Vitamin D) 25 MCG (1000 UT) tablet, Take 1 tablet by mouth Daily., Disp: 90 tablet, Rfl: 1    Continuous Blood Gluc Sensor (FreeStyle Evelio 3 Sensor) misc, Use 1 each Every 14 (Fourteen) Days., Disp: 2 each, Rfl: 5    HYDROcodone-acetaminophen (NORCO) 7.5-325 MG per tablet, Take 7.5 tablets by mouth 2 (Two) Times a Day As Needed., Disp: , Rfl:     losartan (COZAAR) 25 MG tablet, Take 0.5 tablets by mouth Every Night. (Patient taking differently: Take 0.5 tablets by mouth Every Night.), Disp: 45 tablet, Rfl: 3    multivitamin with minerals tablet tablet, Take 1 tablet by mouth Daily., Disp: , Rfl:     apixaban (ELIQUIS) 2.5 MG tablet tablet, Take 1 tablet by mouth 2 (Two) Times a Day. LOT# [id number]  EXP  [id number]   8 BOXES, Disp: 112 tablet, Rfl: 0      ROS:  Cardiac review of systems negative.    Objective     /64   Pulse 89   Ht 193 cm (76\")   Wt 89.8 kg (198 lb)   BMI 24.10 kg/m²       General Appearance:   well " developed  well nourished  HENT:   oropharynx moist  lips not cyanotic  Respiratory:  no respiratory distress  normal breath sounds  no rales  Cardiovascular:  no jugular venous distention  regular rhythm  S1 normal, S2 normal  no S3, no S4   no murmur  no rub, no thrill  No carotid bruit  pedal pulses normal  lower extremity edema: none    Musculoskeletal:  no clubbing of fingers.   normocephalic, head atraumatic  Skin:   warm, dry  Psychiatric:  judgement and insight appropriate  normal mood and affect    ECHO:  Results for orders placed during the hospital encounter of 05/23/24    Adult Transthoracic Echo Complete W/ Cont if Necessary Per Protocol    Interpretation Summary    Left ventricular ejection fraction appears to be 41 - 45% ( with ECHO CONTRAST).    Left ventricular diastolic function is consistent with (grade I) impaired relaxation.    The left atrial cavity is mildly dilated.    STRESS:    CATH:  No results found for this or any previous visit.    BMP:     Glucose   Date Value Ref Range Status   05/23/2024 105 (H) 65 - 99 mg/dL Final     BUN   Date Value Ref Range Status   05/23/2024 13 8 - 23 mg/dL Final     Creatinine   Date Value Ref Range Status   05/23/2024 1.05 0.76 - 1.27 mg/dL Final     Sodium   Date Value Ref Range Status   05/23/2024 142 136 - 145 mmol/L Final     Potassium   Date Value Ref Range Status   05/23/2024 4.1 3.5 - 5.2 mmol/L Final     Chloride   Date Value Ref Range Status   05/23/2024 106 98 - 107 mmol/L Final     CO2   Date Value Ref Range Status   05/23/2024 27.2 22.0 - 29.0 mmol/L Final     Calcium   Date Value Ref Range Status   05/23/2024 8.8 8.6 - 10.5 mg/dL Final     BUN/Creatinine Ratio   Date Value Ref Range Status   05/23/2024 12.4 7.0 - 25.0 Final     Anion Gap   Date Value Ref Range Status   05/23/2024 8.8 5.0 - 15.0 mmol/L Final     eGFR   Date Value Ref Range Status   05/23/2024 77.8 >60.0 mL/min/1.73 Final     LIPIDS:  Total Cholesterol   Date Value Ref Range  Status   05/23/2024 133 0 - 200 mg/dL Final     Triglycerides   Date Value Ref Range Status   05/23/2024 112 0 - 150 mg/dL Final     HDL Cholesterol   Date Value Ref Range Status   05/23/2024 66 (H) 40 - 60 mg/dL Final     LDL Cholesterol    Date Value Ref Range Status   05/23/2024 47 0 - 100 mg/dL Final     VLDL Cholesterol   Date Value Ref Range Status   05/23/2024 20 5 - 40 mg/dL Final     LDL/HDL Ratio   Date Value Ref Range Status   05/23/2024 0.68  Final         Procedures             ASSESSMENT:  Diagnoses and all orders for this visit:    1. Chronic systolic congestive heart failure (Primary)    2. Primary hypertension    3. Mixed hyperlipidemia    4. Chronic deep vein thrombosis (DVT) of lower extremity, unspecified laterality, unspecified vein    Other orders  -     apixaban (ELIQUIS) 2.5 MG tablet tablet; Take 1 tablet by mouth 2 (Two) Times a Day. LOT# [id number]  EXP  [id number]   8 BOXES  Dispense: 112 tablet; Refill: 0         PLAN:    1.  Continue the Eliquis.  The patient notes no problems with bleeding.  2.  Patient was put on aspirin and Lipitor.  His cholesterol is under amazing control.  The neurologist did not feel like this was a mini stroke and was more consistent with his known hypoglycemic episodes.  I do not think he requires the aspirin or the Lipitor at this time.  3.  Patient had cholesterol checked 5/23/2024 with LDL 47, HDL 66, and triglycerides 112.  This was before the Lipitor was added.  4.  Patient's had bradycardia on beta-blockers.  He was just put on losartan 12.5 daily for the very mild cardiomyopathy.  He states he had a left heart cath done in the past which was normal.  He does not tolerate going up on the losartan due to orthostatic hypotension.  Talk to him again about making sure he is drinking plenty of water and limits caffeine.      Return in about 6 months (around 12/4/2024).     Patient was given instructions and counseling regarding his condition or for health  maintenance advice. Please see specific information pulled into the AVS if appropriate.         Zaheer Duarte MD   6/4/2024  12:31 EDT

## 2024-06-05 ENCOUNTER — TELEPHONE (OUTPATIENT)
Dept: ENDOCRINOLOGY | Age: 69
End: 2024-06-05

## 2024-06-05 ENCOUNTER — OFFICE VISIT (OUTPATIENT)
Dept: FAMILY MEDICINE CLINIC | Age: 69
End: 2024-06-05
Payer: MEDICARE

## 2024-06-05 VITALS
BODY MASS INDEX: 23.31 KG/M2 | HEART RATE: 75 BPM | SYSTOLIC BLOOD PRESSURE: 103 MMHG | OXYGEN SATURATION: 100 % | TEMPERATURE: 97.5 F | HEIGHT: 76 IN | DIASTOLIC BLOOD PRESSURE: 53 MMHG | WEIGHT: 191.4 LBS

## 2024-06-05 DIAGNOSIS — F41.1 GENERALIZED ANXIETY DISORDER: ICD-10-CM

## 2024-06-05 DIAGNOSIS — R55 NEAR SYNCOPE: ICD-10-CM

## 2024-06-05 DIAGNOSIS — E16.2 HYPOGLYCEMIA: Primary | ICD-10-CM

## 2024-06-05 DIAGNOSIS — I48.91 ATRIAL FIBRILLATION, UNSPECIFIED TYPE: ICD-10-CM

## 2024-06-05 DIAGNOSIS — F32.9 REACTIVE DEPRESSION: ICD-10-CM

## 2024-06-05 DIAGNOSIS — I10 PRIMARY HYPERTENSION: Chronic | ICD-10-CM

## 2024-06-05 PROCEDURE — 1159F MED LIST DOCD IN RCRD: CPT | Performed by: NURSE PRACTITIONER

## 2024-06-05 PROCEDURE — 3078F DIAST BP <80 MM HG: CPT | Performed by: NURSE PRACTITIONER

## 2024-06-05 PROCEDURE — 1111F DSCHRG MED/CURRENT MED MERGE: CPT | Performed by: NURSE PRACTITIONER

## 2024-06-05 PROCEDURE — 90480 ADMN SARSCOV2 VAC 1/ONLY CMP: CPT | Performed by: NURSE PRACTITIONER

## 2024-06-05 PROCEDURE — 3074F SYST BP LT 130 MM HG: CPT | Performed by: NURSE PRACTITIONER

## 2024-06-05 PROCEDURE — 91320 SARSCV2 VAC 30MCG TRS-SUC IM: CPT | Performed by: NURSE PRACTITIONER

## 2024-06-05 PROCEDURE — 99495 TRANSJ CARE MGMT MOD F2F 14D: CPT | Performed by: NURSE PRACTITIONER

## 2024-06-05 PROCEDURE — 1160F RVW MEDS BY RX/DR IN RCRD: CPT | Performed by: NURSE PRACTITIONER

## 2024-06-05 PROCEDURE — 3044F HG A1C LEVEL LT 7.0%: CPT | Performed by: NURSE PRACTITIONER

## 2024-06-05 RX ORDER — ESCITALOPRAM OXALATE 10 MG/1
10 TABLET ORAL DAILY
Qty: 90 TABLET | Refills: 1 | Status: SHIPPED | OUTPATIENT
Start: 2024-06-05

## 2024-06-05 NOTE — TELEPHONE ENCOUNTER
Called patient to get in sooner with Steve, said he can only do Fridays and he thought his appointment in August was in [facility]. Would like to keep it in August

## 2024-06-05 NOTE — PROGRESS NOTES
Chief Complaint  Timothy R Spurling presents to [facility] for transition of care.      Cyril IS here today with patient to follow up after a recent hospitalization.    Within 48 business hours after discharge our office contacted him via telephone to coordinate his care and needs.    Current outpatient and discharge medications have been reconciled for the patient.  Reviewed by: LINDA Bustos       Jamari was admitted to [facility] on 5/23/2024 and was discharged on 5/24/2024 with a DIAGNOSIS  of  neurological changes due to hypoglycemia, possible tia, hypoglycemia unawareness, sinus bradycardia, cardiomyopathy, left bundle branch block, chronic CHF , dyslipidemia, HTN, history DVT on Eliquis  and treated by hospitalists  with consultations to neurology .      His TREATMENT included monitoring during the hospital stay.     I reviewed  and discussed the details of the hospital admission and they did not have any questions or concerns. :   -discharge summary yes   -medication changes yes   recommended ASA, Statin  continue all other medications   -hospital problems yes   -inpatient lab results (yes  -inpatient diagnostic studies yes  -consultation reports yes     Result Review   The following data was reviewed by: LINDA Bustos on 06/05/2024:  POC Glucose Once (05/23/2024 20:54)  POC Glucose Once (05/23/2024 21:07)  CBC & Differential (05/23/2024 21:13)  Comprehensive Metabolic Panel (05/23/2024 21:13)  Single High Sensitivity Troponin T (05/23/2024 21:13)  Magnesium (05/23/2024 21:13)  Protime-INR (05/23/2024 21:13)  aPTT (05/23/2024 21:13)  Type & Screen (05/23/2024 21:13)  Hemoglobin A1c (05/23/2024 21:13)  Lipid Panel (05/23/2024 21:13)  TSH Rfx On Abnormal To Free T4 (05/23/2024 21:13)  Urinalysis With Microscopic If Indicated (No Culture) - Urine, Clean Catch (05/23/2024 21:18)  ABO RH Specimen Verification (05/23/2024 23:09)  POC Glucose Q6H  (05/24/2024 00:22)  High Sensitivity Troponin T (05/24/2024 04:09)  High Sensitivity Troponin T 2Hr (05/24/2024 05:56)  POC Glucose Once (05/24/2024 06:06)  POC Glucose Q6H (05/24/2024 12:21)    XR Chest 1 View (05/23/2024 21:02)  CT Head Without Contrast Stroke Protocol (05/23/2024 21:10)  CT Angiogram Head w AI Analysis of LVO (05/23/2024 21:31)  CT Angiogram Neck (05/23/2024 21:31)  MRI Brain Without Contrast (05/24/2024 06:49)    Additional discharge recommendations include:I would advise increase his intake more frequent meals/snacks- will see if we can get him in to endo sooner than his next appt in August .     Jamari reports that his condition is improved since discharge.    Cyril continues to struggle with anxiety and depression over the loss of his wife 5 months ago.  He is now ready to start on medication to see if helps.      Assessment and Plan     I have advised that Cyril keep all specialty appointments that have been recommended during the hospital stay to ensure adequate follow up and management of any conditions that require ongoing treatment and monitoring.      Diagnoses and all orders for this visit:    1. Hypoglycemia (Primary)  Comments:  as discussed, continue small frequent high protein meals  keep something on hand in event of hypoglycemic episode   Attempt to obtain sooner appt with endo    2. Near syncope  Comments:  less likely TIA and suspect due to his hypoglycemia   follow up for clearance from neurology as scheduled    3. Atrial fibrillation, unspecified type  Comments:  continue follow up with cardiology as scheduled    4. Reactive depression  Comments:  will start low dose medication and advise stay active  Orders:  -     escitalopram (Lexapro) 10 MG tablet; Take 1 tablet by mouth Daily.  Dispense: 90 tablet; Refill: 1    5. Generalized anxiety disorder  -     escitalopram (Lexapro) 10 MG tablet; Take 1 tablet by mouth Daily.  Dispense: 90 tablet; Refill: 1    6. Primary  "hypertension  Comments:  continue to monitor   f/u with cardiology as scheduled    Other orders  -     COVID-19 F23 (Pfizer) 12yrs+ (COMIRNATY)        Follow Up   Return for Next scheduled follow up.  Follow up appts currently scheduled  include:    Future Appointments   Date Time Provider Department Center   7/11/2024  7:45 AM Gregorio Pierre MD [facility] NATHAN ETWN [facility]   8/9/2024  2:30 PM Carlos Wilson, DO [facility]   9/13/2024 10:30 AM Carlos Wilson, DO [facility]   9/16/2024  8:00 AM Eda Gu APRN [facility] PC BARDS [place]   12/4/2024  9:00 AM Vidhya Reynolds APRN [facility] CD IZZY [facility]    .      Appointments that need to be made include:none  Seen by cardiology yesterday and advised that he can discontinue the ASA and lipitor;  follow up appt with neurology scheduled on 7/11/2024    New Medications Ordered This Visit   Medications    escitalopram (Lexapro) 10 MG tablet     Sig: Take 1 tablet by mouth Daily.     Dispense:  90 tablet     Refill:  1       Medications Discontinued During This Encounter   Medication Reason    apixaban (Eliquis) 2.5 MG tablet tablet *Therapy completed    aspirin 81 MG chewable tablet *Therapy completed    atorvastatin (LIPITOR) 80 MG tablet *Therapy completed       Review of Systems    Objective     Vitals:    06/05/24 1036 06/05/24 1128   BP: (!) 105/38 103/53   BP Location: Right arm Left arm   Patient Position: Sitting Sitting   Cuff Size: Adult Adult   Pulse: 63 75   Temp: 97.5 °F (36.4 °C)    TempSrc: Oral    SpO2: 100%    Weight: 86.8 kg (191 lb 6.4 oz)    Height: 193 cm (76\")      Body mass index is 23.3 kg/m².     Physical Exam                   No Known Allergies   Past Medical History:   Diagnosis Date    Arthritis     \"all over\"    Cancer     pre-cancerous areas on scalp. used chemo cream    Cardiomyopathy     CHF (congestive heart failure)     Chronic deep vein thrombosis (DVT) of lower extremity 01/05/2022    bilateral    Gastric ulcer     History of " transfusion     HL (hearing loss)     Hyperlipemia 01/05/2022    Hypertension 01/05/2022    Hypoglycemia     Low back pain     Long time ago    Pancreatitis     PVC (premature ventricular contraction)      Current Outpatient Medications   Medication Sig Dispense Refill    acarbose (PRECOSE) 25 MG tablet Take 1 tablet by mouth 3 (Three) Times a Day With Meals. 90 tablet 5    amitriptyline (ELAVIL) 75 MG tablet Take 1 tablet by mouth At Night As Needed.      apixaban (ELIQUIS) 2.5 MG tablet tablet Take 1 tablet by mouth 2 (Two) Times a Day. LOT# [id number]  EXP  [id number]   8 BOXES 112 tablet 0    cholecalciferol (Vitamin D) 25 MCG (1000 UT) tablet Take 1 tablet by mouth Daily. 90 tablet 1    Continuous Blood Gluc Sensor (FreeStyle Evelio 3 Sensor) [facility] Use 1 each Every 14 (Fourteen) Days. 2 each 5    HYDROcodone-acetaminophen (NORCO) 7.5-325 MG per tablet Take 7.5 tablets by mouth 2 (Two) Times a Day As Needed.      losartan (COZAAR) 25 MG tablet Take 0.5 tablets by mouth Every Night. (Patient taking differently: Take 0.5 tablets by mouth Every Night.) 45 tablet 3    multivitamin with minerals tablet tablet Take 1 tablet by mouth Daily.      escitalopram (Lexapro) 10 MG tablet Take 1 tablet by mouth Daily. 90 tablet 1     No current facility-administered medications for this visit.     Past Surgical History:   Procedure Laterality Date    CARDIAC CATHETERIZATION      CHOLECYSTECTOMY      COLONOSCOPY      COLONOSCOPY N/A 05/26/2022    Procedure: COLONOSCOPY WITH BIOPSY;  Surgeon: Teddy Gupta MD;  Location: [facility] ENDOSCOPY;  Service: Gastroenterology;  Laterality: N/A;  RECTAL EROSIONS    ERCP      FRACTURE SURGERY      JOINT REPLACEMENT Right     right knee replacement    PANCREAS SURGERY  [id number]    stents placed and removed    STOMACH SURGERY        Health Maintenance Due   Topic Date Due    URINE MICROALBUMIN  Never done    DIABETIC EYE EXAM  Never done    DIABETIC FOOT EXAM  Never done      Immunization  History   Administered Date(s) Administered    ABRYSVO (RSV, 60+ or pregnant women 32-36 wks) 09/13/2023    COVID-19 (MODERNA) BIVALENT 12+YRS 10/12/2022    COVID-19 (PFIZER) BIVALENT 12+YRS 10/12/2022    COVID-19 (PFIZER) Purple Cap Monovalent 03/04/2021, 03/25/2021, 10/15/2021    COVID-19 F23 (PFIZER) 12YRS+ (COMIRNATY) 10/03/2023, 06/05/2024    Covid-19 (Pfizer) Gray Cap Monovalent 04/21/2022    Fluzone (or Fluarix & Flulaval for VFC) >6mos 10/05/2021    Fluzone High-Dose 65+yrs 10/12/2022, 09/12/2023    Pneumococcal Conjugate 13-Valent (PCV13) 02/22/2022    Pneumococcal Conjugate 20-Valent (PCV20) 10/19/2023    Shingrix 07/27/2020, 09/28/2020    Tdap 02/22/2017         Part of this note may be an electronic transcription/translation of spoken language to printed   text using the Dragon Dictation System.      Eda Gu APRN

## 2024-06-05 NOTE — TELEPHONE ENCOUNTER
Caller: ESTEPHANIE MOSS( [facility])    Relationship to patient: Provider    Best call back number: [phone]    Chief complaint: Recent hospitalization due to hypoglycemia.     Type of visit: FOLLOW UP 2    Requested date: ASAP    If rescheduling, when is the original appointment: 8/9/24     Additional notes: PATIENT'S PCP WANTS TO HAVE PATIENT SEEN SOONER DUE TO HYPOGLYCEMIA. HUB COULD ONLY SCHEDULE FOR 8/9/24 - PCP IS OKAY WITH PATIENT SEEING DR. CAMPBELL OR LINDA. PLEASE SEE IF THERE IS A SOONER APPT. THANKS!

## 2024-07-11 ENCOUNTER — PATIENT ROUNDING (BHMG ONLY) (OUTPATIENT)
Dept: NEUROLOGY | Facility: CLINIC | Age: 69
End: 2024-07-11
Payer: MEDICARE

## 2024-07-11 ENCOUNTER — OFFICE VISIT (OUTPATIENT)
Dept: NEUROLOGY | Facility: CLINIC | Age: 69
End: 2024-07-11
Payer: MEDICARE

## 2024-07-11 VITALS
HEART RATE: 71 BPM | SYSTOLIC BLOOD PRESSURE: 113 MMHG | DIASTOLIC BLOOD PRESSURE: 59 MMHG | WEIGHT: 191 LBS | BODY MASS INDEX: 23.26 KG/M2 | HEIGHT: 76 IN

## 2024-07-11 DIAGNOSIS — G45.9 TIA (TRANSIENT ISCHEMIC ATTACK): Primary | ICD-10-CM

## 2024-07-11 NOTE — PROGRESS NOTES
"Chief Complaint  Neurologic Problem ([facility] ED [place] TIA CONSULT)    Subjective          Timothy R Spurling is a 68 y.o. male who presents to [facility]  History of Present Illness  68-year-old man evaluated for an event that occurred May 24.  He states that he was sitting down after he ate supper 40 minutes prior to that and his left face got numb only in the area of his left lip and jaw but not the whole face, tongue or gums and he did not feel well in his left arm was heavy but he can do everything and had no problems with using his left hand.  There was not any problems with his leg, slurring of speech, spinning but he had some problems concentrating.  He called 911 and he was taken to the hospital had a CT angiogram of the head and neck which was unremarkable, MRI of the brain which is unremarkable and echo.  He has a history of VTE and is on Eliquis at 2.5 mg twice a day.  He also has a history of hypertension, hyperlipidemia, chronic systolic congestive heart failure, cardiomyopathy, atrial fibrillation, hypoglycemia.  He is seeing cardiology.    He states that his wife  of an MI last year and he lives by himself.  He he thought that this spell may have been secondary to grief, hypoglycemia, low blood pressure or his dentures not fitting well.    He has 1 daughter, a daughter and a great granddaughter.    Objective   Vital Signs:   /59   Pulse 71   Ht 193 cm (75.98\")   Wt 86.6 kg (191 lb)   BMI 23.26 kg/m²     Physical Exam   He is alert, fluent, phasic, follows commands well.  Visual fields are full, EMs full directions gaze, facial strength is full.  There is no weakness of the upper or lower extremities.  There is no drift.  Fine finger movements are intact.  Reflexes are absent.  Station gait is able to tiptoe, heel walk and tandem without difficulty.  Heart is regular and rhythm normal in rate.        Assessment and Plan  Diagnoses and all orders for " this visit:    1. TIA (transient ischemic attack) (Primary)  Assessment & Plan:  I discussed with him that the diagnosis likely be TIA.  He is to follow-up with primary care who is cardiologist and I would recommend close to be increased to 5 mg twice a day if he does indeed have atrial fibrillation.  This is noted in his chart.  I will see him in the future as needed.  Thank you for letting me participate in his care.           Total time spent with the patient and coordinating patient care was 35 minutes.    Follow Up  No follow-ups on file.  Patient was given instructions and counseling regarding his condition or for health maintenance advice. Please see specific information pulled into the AVS if appropriate.

## 2024-07-11 NOTE — ASSESSMENT & PLAN NOTE
I discussed with him that the diagnosis likely be TIA.  He is to follow-up with primary care who is cardiologist and I would recommend close to be increased to 5 mg twice a day if he does indeed have atrial fibrillation.  This is noted in his chart.  I will see him in the future as needed.  Thank you for letting me participate in his care.

## 2024-07-12 ENCOUNTER — OFFICE VISIT (OUTPATIENT)
Dept: ENDOCRINOLOGY | Age: 69
End: 2024-07-12
Payer: MEDICARE

## 2024-07-12 VITALS
DIASTOLIC BLOOD PRESSURE: 68 MMHG | OXYGEN SATURATION: 98 % | SYSTOLIC BLOOD PRESSURE: 116 MMHG | BODY MASS INDEX: 23.02 KG/M2 | HEART RATE: 57 BPM | HEIGHT: 76 IN | WEIGHT: 189 LBS

## 2024-07-12 DIAGNOSIS — E16.2 HYPOGLYCEMIA: Primary | ICD-10-CM

## 2024-07-12 NOTE — PROGRESS NOTES
Chief complaint/Reason for consult:  hypoglycemia    HPI:   - 68 year old male here for hypoglycemia  - Was last seen in 3/2024  - Had an ED visit due to a TIA  - He had surgery on his GI tract for ulcers as a teenager and has had hypoglycemia since  - He takes acarbose 12.5 mg tid  - He does have hypoglycemia unawareness and uses a FreeStyle Evelio CGM  - He states his symptoms are pretty well controlled as long as he does not eat high carb larger meals    The following portions of the patient's history were reviewed and updated as appropriate: allergies, current medications, past family history, past medical history, past social history, past surgical history, and problem list.      Objective     Vitals:    07/12/24 1057   BP: 116/68   Pulse: 57   SpO2: 98%        Physical Exam  Vitals reviewed.   Constitutional:       Appearance: Normal appearance.   HENT:      Head: Normocephalic and atraumatic.   Eyes:      General: No scleral icterus.  Pulmonary:      Effort: Pulmonary effort is normal. No respiratory distress.   Neurological:      Mental Status: He is alert.      Gait: Gait normal.   Psychiatric:         Mood and Affect: Mood normal.         Behavior: Behavior normal.         Thought Content: Thought content normal.         Judgment: Judgment normal.     CGM interpretation  Dates reviewed:  6/29-7/12/24  Data:  Avg of 110, 1% low  Interpretation:  Very few hypoglycemic episodes but more commonly after supper      Assessment & Plan   Hypoglycemia secondary to GI surgery  - Cont. acarbose 12.5 mg tid, I told him he can increase his dose to 25 mg with supper  - Discussed eating more frequent, higher protein, lower carb meals     - Return to clinic in 6 months

## 2024-07-17 ENCOUNTER — TELEPHONE (OUTPATIENT)
Dept: ENDOCRINOLOGY | Age: 69
End: 2024-07-17

## 2024-07-17 RX ORDER — BLOOD-GLUCOSE SENSOR
1 EACH MISCELLANEOUS
Qty: 2 EACH | Refills: 5 | Status: SHIPPED | OUTPATIENT
Start: 2024-07-17

## 2024-07-17 RX ORDER — ACARBOSE 25 MG/1
25 TABLET ORAL
Qty: 90 TABLET | Refills: 5 | Status: SHIPPED | OUTPATIENT
Start: 2024-07-17 | End: 2025-07-17

## 2024-07-17 NOTE — TELEPHONE ENCOUNTER
Rx Refill Note  Requested Prescriptions     Pending Prescriptions Disp Refills    acarbose (PRECOSE) 25 MG tablet 90 tablet 5     Sig: Take 1 tablet by mouth 3 (Three) Times a Day With Meals.    Continuous Glucose Sensor (FreeStyle Evelio 3 Sensor) misc 2 each 5     Sig: Use 1 each Every 14 (Fourteen) Days.      Last office visit with prescribing clinician: 7/12/2024   Last telemedicine visit with prescribing clinician: Visit date not found   Next office visit with prescribing clinician: 1/17/2025                         Would you like a call back once the refill request has been completed: [] Yes [] No    If the office needs to give you a call back, can they leave a voicemail: [] Yes [] No    Julieta Sr MA  07/17/24, 09:31 EDT

## 2024-07-17 NOTE — TELEPHONE ENCOUNTER
Caller: DENIS WELLS    Relationship: Emergency Contact    Best call back number: [phone]    Which medication are you concerned about: ALL MEDICATION REFILLS    Who prescribed you this medication: DR CAMPBELL    What are your concerns: MS WELLS NEEDS ALL OF HER GRANDFATHERS REFILL TO BE SENT TO [facility]

## 2024-09-11 ENCOUNTER — TELEPHONE (OUTPATIENT)
Dept: ENDOCRINOLOGY | Age: 69
End: 2024-09-11
Payer: MEDICARE

## 2024-09-11 DIAGNOSIS — E16.2 HYPOGLYCEMIA: Primary | ICD-10-CM

## 2024-09-11 RX ORDER — BLOOD-GLUCOSE SENSOR
EACH MISCELLANEOUS
Qty: 6 EACH | Refills: 5 | Status: SHIPPED | OUTPATIENT
Start: 2024-09-11

## 2024-09-11 NOTE — TELEPHONE ENCOUNTER
Called and spoke with pt. Informed pt the Rx was sent. Pt voiced understanding and had no further questions or concerns.

## 2024-09-11 NOTE — TELEPHONE ENCOUNTER
Hub staff attempted to follow warm transfer process and was unsuccessful     Caller: DENIS WELLS    Relationship to patient: Emergency Contact    Best call back number:     Patient is needing: PT IS NEEDING A PRESCRIPTION SENT TO DIFFERENT PHARMACY FOR HIS IZABEL 3 SENSORS. PLEASE ADVISE.  PT PHARMACY DOES NOT HAVE ANY IN STOCK UNTIL OCTOBER.   [facility] - [place], [place] - [address] - [phone]  - [phone] FX   [address] [facility] [address]   Phone: [phone] Fax: [phone]   Hours: Not open 24 hours     Continuous Glucose Sensor (FreeStyle Izabel 3 Sensor) misc

## 2024-09-16 ENCOUNTER — OFFICE VISIT (OUTPATIENT)
Dept: FAMILY MEDICINE CLINIC | Age: 69
End: 2024-09-16
Payer: MEDICARE

## 2024-09-16 VITALS
TEMPERATURE: 97.6 F | HEIGHT: 76 IN | DIASTOLIC BLOOD PRESSURE: 59 MMHG | SYSTOLIC BLOOD PRESSURE: 108 MMHG | HEART RATE: 66 BPM | BODY MASS INDEX: 22.67 KG/M2 | WEIGHT: 186.2 LBS

## 2024-09-16 DIAGNOSIS — F32.9 REACTIVE DEPRESSION: Primary | ICD-10-CM

## 2024-09-16 DIAGNOSIS — Z86.73 HISTORY OF TIA (TRANSIENT ISCHEMIC ATTACK): ICD-10-CM

## 2024-09-16 DIAGNOSIS — Z23 ENCOUNTER FOR IMMUNIZATION: ICD-10-CM

## 2024-09-16 DIAGNOSIS — F41.1 GENERALIZED ANXIETY DISORDER: ICD-10-CM

## 2024-09-16 DIAGNOSIS — E16.2 HYPOGLYCEMIA: ICD-10-CM

## 2024-09-16 PROCEDURE — 1160F RVW MEDS BY RX/DR IN RCRD: CPT | Performed by: NURSE PRACTITIONER

## 2024-09-16 PROCEDURE — 3074F SYST BP LT 130 MM HG: CPT | Performed by: NURSE PRACTITIONER

## 2024-09-16 PROCEDURE — 3078F DIAST BP <80 MM HG: CPT | Performed by: NURSE PRACTITIONER

## 2024-09-16 PROCEDURE — 91320 SARSCV2 VAC 30MCG TRS-SUC IM: CPT | Performed by: NURSE PRACTITIONER

## 2024-09-16 PROCEDURE — 3044F HG A1C LEVEL LT 7.0%: CPT | Performed by: NURSE PRACTITIONER

## 2024-09-16 PROCEDURE — 90480 ADMN SARSCOV2 VAC 1/ONLY CMP: CPT | Performed by: NURSE PRACTITIONER

## 2024-09-16 PROCEDURE — G0008 ADMIN INFLUENZA VIRUS VAC: HCPCS | Performed by: NURSE PRACTITIONER

## 2024-09-16 PROCEDURE — 90662 IIV NO PRSV INCREASED AG IM: CPT | Performed by: NURSE PRACTITIONER

## 2024-09-16 PROCEDURE — 99214 OFFICE O/P EST MOD 30 MIN: CPT | Performed by: NURSE PRACTITIONER

## 2024-09-16 PROCEDURE — 1159F MED LIST DOCD IN RCRD: CPT | Performed by: NURSE PRACTITIONER

## 2024-09-16 RX ORDER — ESCITALOPRAM OXALATE 10 MG/1
10 TABLET ORAL DAILY
Qty: 90 TABLET | Refills: 1 | Status: SHIPPED | OUTPATIENT
Start: 2024-09-16

## 2024-10-07 ENCOUNTER — TELEPHONE (OUTPATIENT)
Dept: ENDOCRINOLOGY | Age: 69
End: 2024-10-07
Payer: MEDICARE

## 2024-10-07 ENCOUNTER — PRIOR AUTHORIZATION (OUTPATIENT)
Dept: ENDOCRINOLOGY | Age: 69
End: 2024-10-07
Payer: MEDICARE

## 2024-10-07 NOTE — TELEPHONE ENCOUNTER
A PA for FreeStyle Evelio 3 Plus Sensor has been started.    (Key: [id number])  PA Case ID #: [id number]

## 2024-10-07 NOTE — TELEPHONE ENCOUNTER
Called and spoke with pt. Informed pt I have submitted a prior auth to his insurance company. Informed pt it could take 24-72 hours before we receive a response. Pt voiced understanding and had no further questions or concerns.

## 2024-10-07 NOTE — TELEPHONE ENCOUNTER
"    Caller: Spurling, Timothy R \"Cyril\"    Relationship to patient: Self    Best call back number: [phone]    Patient is needing: PATIENTS PHARMACY WILL NOT FILL HIS IZABEL 3 SENSORS BECAUSE INSURANCE REQUIRES OKAY FROM THE DOCTOR   "

## 2024-10-09 NOTE — TELEPHONE ENCOUNTER
Denied on October 8 by [employer] 2017    PA Case: [id number], Status: Denied. Notification: Completed.

## 2024-10-11 ENCOUNTER — TELEPHONE (OUTPATIENT)
Dept: ENDOCRINOLOGY | Age: 69
End: 2024-10-11
Payer: MEDICARE

## 2024-10-11 NOTE — TELEPHONE ENCOUNTER
PT CALLED IN AND STATED THAT HIS INSURANCE CO WILL PAY FOR THE FREESTLYE IZABEL 3. SO HE IS ASKING IF HE COULD GET THE FREE STYLE IZABEL 3 INSTEAD OF THE IZABEL 3 PLUS.

## 2024-10-11 NOTE — TELEPHONE ENCOUNTER
Called and spoke with pt. Informed pt the PA for his Evelio was denied. Informed pt he can contact [employer] and they can provide him with a copay card and he can get Evelio for $75 for a 30 day supply. Pt voiced understanding and had no further questions or concerns.

## 2024-10-11 NOTE — TELEPHONE ENCOUNTER
Caller: DENIS WELLS    Relationship to patient: Emergency Contact    Best call back number: [phone]     Patient is needing: PT ADVISED BY PHARMACY THAT INCORRECT RX WAS PROVIDED RESULTING IN DENIAL BY INSURANCE COMPANY. RX FOR IZABEL IZABEL 3 PLUS IS INCORRECT, PLEASE RESUBMIT AS FREESTYLE IZABEL 3 ASAP. PLEASE CALL TO CONFIRM WHEN SUBMITTED.

## 2024-10-14 RX ORDER — BLOOD-GLUCOSE SENSOR
1 EACH MISCELLANEOUS
Qty: 6 EACH | Refills: 3 | Status: SHIPPED | OUTPATIENT
Start: 2024-10-14

## 2024-10-14 NOTE — TELEPHONE ENCOUNTER
A PA for FreeStyle Evelio 3 Sensor has been started.    (Key: [id number])    Available without authorization. The member is able to fill the requested drug at the pharmacy. If coverage is still needed or requesting prior to the expiration of a current authorization, a request can be made by sending a fax or calling the number on the back of the member's ID card.

## 2024-10-21 NOTE — TELEPHONE ENCOUNTER
Pt called in. Pt stated his pharmacy is unable to get Evelio 3 in stock, and they advise he get an Rx for Evelio 3 Plus. Informed pt that the his insurance company will not pay for the Evelio 3 Plus but will pay for Evelio 3. Informed pt that lots of pharmacy's are struggling to get the Evelio 3 in, advised pt to contact other local pharmacy's in his area and see if they have the Evelio 3 in stock. Pt voiced understanding and had no further questions or concerns.

## 2024-12-02 ENCOUNTER — TELEPHONE (OUTPATIENT)
Dept: ENDOCRINOLOGY | Age: 69
End: 2024-12-02
Payer: MEDICARE

## 2024-12-02 RX ORDER — ACARBOSE 100 MG/1
100 TABLET ORAL
Qty: 90 TABLET | Refills: 5 | Status: SHIPPED | OUTPATIENT
Start: 2024-12-02 | End: 2025-12-02

## 2024-12-02 RX ORDER — ACARBOSE 25 MG/1
25 TABLET ORAL
Qty: 90 TABLET | Refills: 5 | Status: CANCELLED | OUTPATIENT
Start: 2024-12-02 | End: 2025-12-02

## 2024-12-02 NOTE — TELEPHONE ENCOUNTER
PT CALLED IN STATING HE STARTED OFF TAKEN 25 MG OF ACARBOSE AND WAS TOLD HE COULD TAKE UP TO 100MG, WHICH IS WHAT HE HAS BEEN DOING. SO NOW HE NEEDS A NEW RX.      acarbose (PRECOSE) 25 MG tablet [19385] PT IS TAKING 100 MG        GOING TO:    [id number] - [place], [place] - [address] AT [facility] [address] - [phone] [facility] [phone] FX

## 2024-12-04 ENCOUNTER — OFFICE VISIT (OUTPATIENT)
Dept: CARDIOLOGY | Facility: CLINIC | Age: 69
End: 2024-12-04
Payer: MEDICARE

## 2024-12-04 VITALS
OXYGEN SATURATION: 99 % | SYSTOLIC BLOOD PRESSURE: 101 MMHG | WEIGHT: 184.2 LBS | HEART RATE: 55 BPM | HEIGHT: 76 IN | DIASTOLIC BLOOD PRESSURE: 73 MMHG | BODY MASS INDEX: 22.43 KG/M2

## 2024-12-04 DIAGNOSIS — I10 PRIMARY HYPERTENSION: ICD-10-CM

## 2024-12-04 DIAGNOSIS — E78.2 MIXED HYPERLIPIDEMIA: ICD-10-CM

## 2024-12-04 DIAGNOSIS — I82.509 CHRONIC DEEP VEIN THROMBOSIS (DVT) OF LOWER EXTREMITY, UNSPECIFIED LATERALITY, UNSPECIFIED VEIN: ICD-10-CM

## 2024-12-04 DIAGNOSIS — I50.22 CHRONIC SYSTOLIC CONGESTIVE HEART FAILURE: Primary | ICD-10-CM

## 2024-12-04 RX ORDER — KETOROLAC TROMETHAMINE 30 MG/ML
INJECTION, SOLUTION INTRAMUSCULAR; INTRAVENOUS
COMMUNITY
Start: 2024-12-02

## 2024-12-04 NOTE — ASSESSMENT & PLAN NOTE
Most recent echocardiogram with ejection fraction 40 to 45%.  Likely nonischemic as patient reports previous heart catheterization that was normal.  Patient is unable to tolerate the addition of guideline directed medical therapy due to orthostatic hypotension and bradycardia.  Continue low-dose losartan.  He appears euvolemic and well compensated on exam.  He is asymptomatic.

## 2024-12-04 NOTE — PROGRESS NOTES
"Chief Complaint  Hypertension (6m f/u); Hyperlipidemia; Chronic systolic congestive heart failure; and Chronic deep vein thrombosis (DVT) of lower extremity, unsp    Subjective        History of Present Illness  Timothy R Spurling presents to [facility] for follow up.   Patient is a 68-year-old male with past medical history outlined below, significant for nonischemic cardiomyopathy, hypertension, chronic DVT And hyperlipidemia who presents for routine follow-up.  He is doing very well from a cardiac standpoint.  He really has no complaints or concerns today.  He denies any chest pain or discomfort.  He notes no dyspnea, edema, palpitations.    Past Medical History:   Diagnosis Date    Arthritis     \"all over\"    Cancer     pre-cancerous areas on scalp. used chemo cream    Cardiomyopathy     CHF (congestive heart failure)     Cholelithiasis     Chronic deep vein thrombosis (DVT) of lower extremity 01/05/2022    bilateral    Coronary artery disease     Diabetes mellitus     Gastric ulcer     History of transfusion     HL (hearing loss)     Hyperlipemia 01/05/2022    Hypertension 01/05/2022    Hypoglycemia     Low back pain     Long time ago    Neuropathy in diabetes     Pancreatitis     PVC (premature ventricular contraction)     Stroke     TIA (transient ischemic attack) 07/11/2024    Vision loss     Vitamin D deficiency        ALLERGY  No Known Allergies     Past Surgical History:   Procedure Laterality Date    CARDIAC CATHETERIZATION      CHOLECYSTECTOMY      COLONOSCOPY      COLONOSCOPY N/A 05/26/2022    Procedure: COLONOSCOPY WITH BIOPSY;  Surgeon: Teddy Gupta MD;  Location: [facility];  Service: Gastroenterology;  Laterality: N/A;  RECTAL EROSIONS    ERCP      FRACTURE SURGERY      JOINT REPLACEMENT Right     right knee replacement    PANCREAS SURGERY  [id number]    stents placed and removed    STOMACH SURGERY          Social History     Socioeconomic History    " Marital status:    Tobacco Use    Smoking status: Never     Passive exposure: Never    Smokeless tobacco: Never   Vaping Use    Vaping status: Never Used   Substance and Sexual Activity    Alcohol use: Not Currently     Comment: Beer on rare occasion    Drug use: Never    Sexual activity: Not Currently     Partners: Female       Family History   Problem Relation Age of Onset    Emphysema Mother     Arthritis Mother     Clotting disorder Father     Lung cancer Father     Stroke Father     Lung cancer Brother     Colon cancer Neg Hx         Current Outpatient Medications on File Prior to Visit   Medication Sig    acarbose (PRECOSE) 100 MG tablet Take 1 tablet by mouth 3 (Three) Times a Day With Meals.    apixaban (ELIQUIS) 2.5 MG tablet tablet Take 1 tablet by mouth 2 (Two) Times a Day. LOT# [id number]  EXP  [id number]   8 BOXES    Continuous Glucose Monitor Sup kit Freestyle Evelio 3    Continuous Glucose  (FreeStyle Evelio 3 [place]) device     Continuous Glucose Sensor (FreeStyle Evelio 3 Sensor) misc Use 1 Device Every 14 (Fourteen) Days.    escitalopram (Lexapro) 10 MG tablet Take 1 tablet by mouth Daily.    HYDROcodone-acetaminophen (NORCO) 7.5-325 MG per tablet Take 7.5 tablets by mouth 2 (Two) Times a Day As Needed.    losartan (COZAAR) 25 MG tablet Take 0.5 tablets by mouth Every Night.    multivitamin with minerals tablet tablet Take 1 tablet by mouth Daily.    RSVPreF3 Vac Recomb Adjuvanted (Arexvy) 120 MCG/0.5ML reconstituted suspension injection Inject 0.5 mL into the appropriate muscle as directed by prescriber. (Patient not taking: Reported on 12/4/2024)    [DISCONTINUED] amitriptyline (ELAVIL) 75 MG tablet Take 1 tablet by mouth At Night As Needed.     No current facility-administered medications on file prior to visit.       Objective   Vitals:    12/04/24 0910   BP: 101/73   BP Location: Left arm   Patient Position: Sitting   Cuff Size: Adult   Pulse: 55   SpO2: 99%   Weight: 83.6 kg (184 lb  "3.2 oz)   Height: 193 cm (75.98\")       Physical Exam  Constitutional:       General: He is awake. He is not in acute distress.     Appearance: Normal appearance.   HENT:      Head: Normocephalic.      Nose: Nose normal. No congestion.   Eyes:      Extraocular Movements: Extraocular movements intact.      Conjunctiva/sclera: Conjunctivae normal.      Pupils: Pupils are equal, round, and reactive to light.   Neck:      Thyroid: No thyromegaly.      Vascular: No JVD.   Cardiovascular:      Rate and Rhythm: Normal rate and regular rhythm.      Chest Wall: PMI is not displaced.      Pulses: Normal pulses.      Heart sounds: Normal heart sounds, S1 normal and S2 normal. No murmur heard.     No friction rub. No gallop. No S3 or S4 sounds.   Pulmonary:      Effort: Pulmonary effort is normal.      Breath sounds: Normal breath sounds. No wheezing, rhonchi or rales.   Abdominal:      General: Bowel sounds are normal.      Palpations: Abdomen is soft.      Tenderness: There is no abdominal tenderness.   Musculoskeletal:      Cervical back: No tenderness.      Right lower leg: No edema.      Left lower leg: No edema.   Lymphadenopathy:      Cervical: No cervical adenopathy.   Skin:     General: Skin is warm and dry.      Capillary Refill: Capillary refill takes less than 2 seconds.      Coloration: Skin is not cyanotic.      Findings: No petechiae or rash.      Nails: There is no clubbing.   Neurological:      Mental Status: He is alert.   Psychiatric:         Mood and Affect: Mood normal.         Behavior: Behavior is cooperative.           Result Review     The following data was reviewed by LINDA Santos on 12/04/24.      CMP          3/12/2024    09:39 5/23/2024    21:13   CMP   Glucose 100  105    BUN 14  13    Creatinine 1.03  1.05    EGFR 79.6  77.8    Sodium 141  142    Potassium 4.5  4.1    Chloride 106  106    Calcium 9.0  8.8    Total Protein 6.2  6.2    Albumin 4.4  4.0    Globulin 1.8  2.2    Total " Bilirubin 0.6  0.5    Alkaline Phosphatase 62  57    AST (SGOT) 20  18    ALT (SGPT) 11  13    Albumin/Globulin Ratio 2.4  1.8    BUN/Creatinine Ratio 13.6  12.4    Anion Gap 8.4  8.8      CBC w/diff          3/12/2024    09:39 5/23/2024    21:13   CBC w/Diff   WBC 3.61  5.40    RBC 4.23  4.05    Hemoglobin 13.7  13.0    Hematocrit 40.7  38.4    MCV 96.2  94.8    MCH 32.4  32.1    MCHC 33.7  33.9    RDW 12.6  12.4    Platelets 145  142    Neutrophil Rel % 65.9  78.5    Immature Granulocyte Rel % 0.0  0.2    Lymphocyte Rel % 25.2  13.5    Monocyte Rel % 7.8  6.9    Eosinophil Rel % 1.1  0.7    Basophil Rel % 0.0  0.2       Lipid Panel          3/12/2024    09:39 5/23/2024    21:13   Lipid Panel   Total Cholesterol 145  133    Triglycerides 68  112    HDL Cholesterol 72  66    VLDL Cholesterol 14  20    LDL Cholesterol  59  47    LDL/HDL Ratio 0.83  0.68        Results for orders placed during the hospital encounter of 05/23/24    Adult Transthoracic Echo Complete W/ Cont if Necessary Per Protocol    Interpretation Summary    Left ventricular ejection fraction appears to be 41 - 45% ( with ECHO CONTRAST).    Left ventricular diastolic function is consistent with (grade I) impaired relaxation.    The left atrial cavity is mildly dilated.      No results found for this or any previous visit.          Procedures      Assessment & Plan  Chronic systolic congestive heart failure  Most recent echocardiogram with ejection fraction 40 to 45%.  Likely nonischemic as patient reports previous heart catheterization that was normal.  Patient is unable to tolerate the addition of guideline directed medical therapy due to orthostatic hypotension and bradycardia.  Continue low-dose losartan.  He appears euvolemic and well compensated on exam.  He is asymptomatic.  Primary hypertension  Blood pressure is well-controlled.  Mixed hyperlipidemia  LDL is at goal.  Chronic deep vein thrombosis (DVT) of lower extremity, unspecified  laterality, unspecified vein  Continue Eliquis.  Patient notes no bleeding issues or concerns.    Follow Up   Return in 6 months (on 6/4/2025) for With LINDA Raphael, With Dr. Duarte.    Patient was given instructions and counseling regarding his condition or for health maintenance advice. Please see specific information pulled into the AVS if appropriate.     LINDA Santos  12/04/24  11:23 EST    Dictated Utilizing Dragon Dictation

## 2024-12-04 NOTE — ASSESSMENT & PLAN NOTE
Continue Eliquis.  Patient notes no bleeding issues or concerns.   Reason For Visit    Chief Complaint: fu note.   Skilled Nursing Facility:   Facility: [facility].   SNF Attending: sandra.   SNF APN: traci link.   PCP Name & Contact: melo pace.   Date of Admission: 4/23.   Hospital: [facility].   Admission/Discharge Date: 4/19-4/23.   Skilled Care Need: Occupational Therapy, Pain Management, Physical Therapy and Wound Care. surgcal site.   Information Given By: self and her adult child.   Accompanied By: her adult child.      History of Present Illness  Per Chart Review.   Per H&P \"76 y.o. AAF with PMH of hypertensive HD with CKD, Stage III, diet-controlled DM type II, peripheral neuropathy, delusions of parasitosis, GLENYS, and s/p T11-T12 laminectomy for thoracic myelopathy in January 2016 who presented to the [facility] ED with progressively worsening B/L LE weakness and decreased sensation over the past 2 wks. and as per daughter patient normally independent with her ADLs walks with a Rollator does is able to do chores around the house also babysits her nephew but lately she noticed that patient had become weak and weaker and weaker and started having falls because of the weakness. No injuries with the falls. Finally patient decided to go to the ER because of the lower extremity weakness. As per patient and daughter in the ER patient had an MRI done and was seen by surgery recommended repair of the previous laminectomy. Her hospital records the pt is a poor historian due to her preoccupation with thoughts that her home and body are invested with bugs. Per review of the medical chart, pt has been having numbness and tingling in her legs up to her hips with progressive loss of functionality. She has gone from ambulating to using a walker to using a wheelchair a few days ago. Upon admission, the pt was hemodynamically stable. Lab work up showed CKD and chronic microcytic anemia. X-ray L-spine showed normal lumbar lordosis is preserved. Sagittal alignment and vertebral body heights are  maintained. Multilevel degenerative changes of the lumbar spine are present with multilevel loss of intervertebral disc height, scattered ventral disc osteophyte complexes as well as multilevel advanced facet joint degenerative changes. Intervertebral disc height loss is most pronounced at L3-L4 and L5-S1. Suspect neural foraminal narrowing at L5-S1. No evidence for acute lumbar spine fracture or subluxation.Moderate to large amount of stool throughout the colon. The pt was administered Lyrica and NS 1L and admitted for further evaluation and treatment. An MRI was done which showed T and L-spine severe central canal stenosis at T10-11 to T12 with significant compression of the epi conus and focal myopic myelopathy. Severe bilateral neural foraminal narrowing at T11-T12, mild bilateral at T12-L1, L4-L5 and on the right at L5-S1. Neurosurgery was consulted and patient underwent open T11-T12 decompressive laminectomy and revision by Dr. Ulloa on 4/20. Patient was started on gabapentin 3 times a day Lyrica twice daily as needed tizanidine twice daily as needed and recommended PT OT. Once stabilized patient was transferred to [facility] for continuing rehab.  Patient seen and examined lying in bed today appears to be comfortable denies any pain or discomfort daughter at bedside family and patient very concerned unhappy with the way she was treated last night when she came to [facility]. Did have a long conversation with the patient and the family understand that it might have been a misunderstanding will follow up with [facility] management. Currently in better mood agreeable to staying and continuing therapy. Patient currently alert awake oriented Ã—3 denies any pain or discomfort patient's daughter who is a POA is at bedside says normally patient very functional and very with it while in the interview and discussion patient does appear to be a little off track and speaking in in tangents but otherwise aware where she is  aware of the time date president the year or month and oriented Ã—3. Will have speech therapy do a complete Mini-Mental. Did not discuss any bugs infestation in the house or bugs on her during my interview. Exam currently patient with decreased sensation bilaterally lower extremity power 1 out of 5 bilateral lower extremity able to wiggle the toes but not able to lift the legs off the bed. Also does not have any proprioception. Denies any bowel or bladder incontinence or saddle anesthesia no tenderness in the surgical site or the back neck. Patient surgical site seen clean open to air with sutures currently with no signs of bleeding or infection will continue to monitor will start patient on PT OT recall site care and follow-up with neurosurgery. Discussed with family patient's POA CODE STATUS patient wishes to be DNR daughter agrees that patient always wanted to be DNR currently with no papers daughter to bring it and if not available will have patient sign a new one. As per nursing patient not eating well since patient got here agreeable to do taking Ensure 3 times a day will order also daughter will bring food from home according to daughter patient very picky about the food she eats and does not like all kinds of food. Get dietary consult for patient to pick out which he likes do have unrealistic expectations about the menu did explain to daughter and patient that it cannot be very selective as it is produced for the whole facility but if they wish patient can have food from home as long as it is healthy food\".    4/25 Patient seen and examined today. Initially hesitant to speak with me because she thought I was there to talk to her about \"being crazy \"when she realized I was there to discuss rehab in her back issues she became more cooperative. Required frequent redirecting and she often perseverates on bug bites that she obtain which she thinks is the reasoning of why she required further back surgery.  Continues to have bilateral lower extremity numbness/weakness. Participating in therapy. Appetite fair/good, sleeping okay.    4/26  Seen and examined sitting comfortably in wheelchair denies any pain or discomfort does have some complaints regarding CNA's but otherwise in good spirits and joking with CNA. As per therapy updates patient max assist for all ADLs independent for toilet transfers patient did have a slums done by speech therapy with a score of 19/30 currently nonambulatory stairs not tested mod Ã—2 assist for transfers and bed mobility will continue to assist with ADLs as per patient eating well sleeping okay.      Review of SystemsConst: no fever, no chills and not feeling tired.   ENT: no nasal passage blockage.   CV: no chest pain, no palpitations and no lower extremity edema.   Resp: no cough and not expressed as feeling short of breath.   GI: no abdominal pain, no nausea, no diarrhea and no constipation.   Musc: back pain.   Neuro: difficulty walking, numbness and feeling weak.   Psych:. Per HPI.   Skin: skin wound.   All other systems reviewed and negative.      Allergies  No Known Drug Allergies    Current Meds    Medication Name Instruction   AmLODIPine Besylate 5 MG Oral Tablet take one tablet by mouth one time daily   Azithromycin 250 MG Oral Tablet TAKE 2 TABLETS ON DAY 1 THEN TAKE 1 TABLET A DAY FOR 4 DAYS.   B-Complex Balanced Oral Tablet TAKE 1 TABLET DAILY AS DIRECTED.   B-Complex Oral Tablet TAKE ONE TABLET BY MOUTH EVERY DAY AS DIRECTED   Diclofenac Sodium 1 % Transdermal Gel (Voltaren) APPLY SPARINGLY TO AFFECTED joints ONCE DAILY (for arthritis)   Ferrous Sulfate 325 (65 Fe) MG Oral Tablet TAKE ONE TABLET BY MOUTH EVERY 12 HOURS   Fluocinolone Acetonide 0.025 % External Ointment apply to affected area twice daily for 7 days   Gabapentin 300 MG Oral Capsule TAKE ONE CAPSULE THREE TIMES A DAY FOR NUMBNESS IN BOTH LEGS   Hydrocodone-Acetaminophen 7.5-325 MG Oral Tablet TAKE 1 TABLET EVERY  6 HOURS AS NEEDED FOR PAIN.   Levocetirizine Dihydrochloride 5 MG Oral Tablet TAKE 1 TABLET DAILY IN THE EVENING for post nasal drip and sneezing/sinus congestion   Lyrica 75 MG Oral Capsule TAKE 1 CAPSULE TWICE DAILY   Meloxicam 7.5 MG Oral Tablet TAKE ONE TABLET BY MOUTH TWO TIMES A DAY AS NEEDED FOR MUSCLE PAIN AND INFLAMMATION   Permethrin 5 % External Cream MASSAGE INTO SKIN FROM HEAD TO SOLES OF FEET. WASH OFF AFTER 8-14 HOURS   RisperiDONE 0.25 MG Oral Tablet TAKE 1 TABLET  ONCE DAILY FOR A WEEK THEN TWICE DAILY THEREAFTER   Spironolactone-HCTZ 25-25 MG Oral Tablet (Aldactazide) TAKE ONE TABLET BY MOUTH DAILY   TiZANidine HCl - 4 MG Oral Tablet TAKE ONE TABLET BY MOUTH TWICE DAILY AS NEEDED FOR MUSCLE SPASM   Triamcinolone Acetonide 0.1 % External Ointment APPLY TO AFFECTED AREA(S) TWO TIMES A DAY     [facility] med list reviewed.     Inpatient Discharge Medications Reconciled Against Current Medication List.      Active Problems  Allergic rhinitis (J30.9)  Benign neoplasm of skin of right lower extremity (D23.71)  Benign neoplasm of skin of right upper extremity (D23.61)  Benign neoplasm of skin of trunk (D23.5)  Bilateral leg numbness (R20.0)  Cataract (H26.9)  Controlled type 2 diabetes mellitus with kidney complication, without long-term current  use of insulin (E11.29)  Controlled type 2 diabetes with neuropathy (E11.40)  Delusions of parasitosis (F22)  Depression (F32.9)  Hyperlipidemia (E78.5)  Hypertensive kidney disease (I12.9)  Iron deficiency anemia (D50.9)  Melasma (L81.1)  Need for immunization against influenza (Z23)  Osteoarthritis of both knees, unspecified osteoarthritis type (M17.0)  Peripheral neuropathy (G62.9)  Scar (L90.5)  Spinal stenosis of thoracic region (M48.04)  Stage III chronic kidney disease (N18.3)  Status post laminectomy (Z98.890)  Weakness of both lower extremities (R29.898)  Weight loss (R63.4)    Past Medical History  Allergic rhinitis (J30.9)  Need for immunization against  influenza (Z23)    Surgical History  History of Tonsillectomy    Family History  Family history of asthma (Z82.5)  Denied: Family history of coronary artery disease  Denied: Family history of diabetes mellitus  Denied: Family history of coronary artery disease  Denied: Family history of diabetes mellitus  Denied: Family history of coronary artery disease  Denied: Family history of diabetes mellitus    Social History  Alcohol  Denied: Considered Quitting Drinking Alcohol  Denied: Drinking Alcohol Regularly, Feeling Guilty About It  Denied: Drug Use  Denied: Getting Angry When Talked To About Drinking  Denied: Having A Drink Or Two In The Morning To Get Going  Smoker, current status unknown (F17.200)  Tobacco use (Z72.0)    Review  Past medical history, problem list, family medical history, surgical history and social history reviewed.      Vitals  127/70, 80, 20, 98%.      Screening  Timed Get Up and Go:   The patient has fallen within the last year.      Physical Exam  Constitutiontal: alert, in no acute distress and current vital signs reviewed.     Head and Face: atraumatic, no deformities, normocephalic.     Eyes: no discharge, normal conjunctiva, no eyelid swelling and the sclerae were normal. extraocular movements were intact.     ENT: normal appearing nose. no nasal discharge. normal lips. oral mucosa pink and moist, no oral lesions.     Neck: normal appearing neck.     Pulmonary: no respiratory distress, normal respiratory rate and effort and no accessory muscle use.     Cardiovascular: normal S1 and normal S2. edema was not present in the lower extremities.     Abdomen: soft, nontender, nondistended and normal bowel sounds. no hepatomegaly.     Musculoskeletal: see above. no musculoskeletal erythema was seen, no joint swelling seen and no joint tenderness was elicited. BLE strength 1/5; able to move toes; BUE strength 3-4/5     Neurologic: cranial nerves grossly intact. Decreased sensation bilaterally in the  lower extremities, proprioception and position sensation as well as decreased sensation to light touch. no coordination deficits.     Psychiatric: oriented to person, oriented to place and oriented to time. alert and awake.  Thought Processes: disordered.     Skin, Hair, Nails: normal skin color and pigmentation and no rash . Surgical site clean open to air with sutures - no erythema/drainage.     Constitutional: alert, in no acute distress and current vital signs reviewed.      Results/Data    20Apr2018   CBC WITH AUTOMATED DIFFERENTIAL   HEMOGLOBIN: 9.8 g/dl Abnormal Low Reference Range 12.0-15.5  HEMATOCRIT: 30.6 % Abnormal Low Reference Range 36.0-46.5  PLATELET COUNT: 293 K/mcL Reference Range 140-450  BASIC METABOLIC PNL   CREATININE: 1.17 mg/dl Abnormal High Reference Range 0.51-0.95  GFR EST. AMER: 53   GFR EST.NONAFRI AMER: 46   [id number]   CBC WITH AUTOMATED DIFFERENTIAL   HEMOGLOBIN: 11.2 g/dl Abnormal Low Reference Range 12.0-15.5  HEMATOCRIT: 34.6 % Abnormal Low Reference Range 36.0-46.5  PLATELET COUNT: 365 K/mcL Reference Range 140-450  COMP METABOLIC PNL   CREATININE: 1.21 mg/dl Abnormal High Reference Range 0.51-0.95  GFR EST. AMER: 51   GFR EST.NONAFRI AMER: 44     Immunizations   ** Printed in Appendix #1 below.     Assessment  Controlled type 2 diabetes mellitus with kidney complication, without long-term current  use of insulin (E11.29)  Controlled type 2 diabetes with neuropathy (E11.40)  Delusions of parasitosis (F22)  Hyperlipidemia (E78.5)  Hypertensive kidney disease (I12.9)  Iron deficiency anemia (D50.9)  Peripheral neuropathy (G62.9)  Spinal stenosis of thoracic region (M48.04)  Stage III chronic kidney disease (N18.3)  Status post laminectomy (Z98.890)  Weakness of both lower extremities (R29.898)    Plan  T11-T12 nerve compression and central canal stenosis  Status post decompressive laminectomy  Surgical site clean still with sutures  Continue pain management with  gabapentin and Lyrica  Continue PT OT and fall precautions currently max dependent  Follow-up with neurosurgery on  will follow recommendations    Hypertension  Continue to monitor vitals every shift continue current medications blood pressure well controlled titrate as needed    CKD  Monitor renal function avoid nephrotoxic medications    Anemia  We will continue to monitor continue on iron currently with no active signs of bleeding    Diabetes  Continue to monitor and currently diet controlled    History of paranoia and delusions  Continue to redirect and reorient  May need follow-up with behavioral health as outpatient    Poor appetite  Continue to encourage p.o. intake patient is selective about what she eats  Currently with no complaints    DVT prophylaxis heparin  .       Time  Total face to face time spent with patient >25 minutes. Greater than 50% of the total time was spent counseling and/or coordinating care.      Signatures   Electronically signed by : CARLOS POWER M.D.; 2018  3:23PM CST    Appendix #1     Patient: BLADE ESPARZA; : 1942; MRN: [id number]      1 2 3 4 5    FLU (SPLT PF)  2011        Influenza  29-Sep-2011 24-Sep-2012 24-Dec-2015 05-Oct-2016 13-Sep-2017    Tdap  2009

## 2025-01-17 ENCOUNTER — OFFICE VISIT (OUTPATIENT)
Dept: ENDOCRINOLOGY | Age: 70
End: 2025-01-17
Payer: MEDICARE

## 2025-01-17 VITALS
DIASTOLIC BLOOD PRESSURE: 82 MMHG | WEIGHT: 183.4 LBS | BODY MASS INDEX: 22.33 KG/M2 | HEART RATE: 66 BPM | OXYGEN SATURATION: 99 % | HEIGHT: 76 IN | SYSTOLIC BLOOD PRESSURE: 124 MMHG

## 2025-01-17 DIAGNOSIS — E16.2 HYPOGLYCEMIA: Primary | ICD-10-CM

## 2025-01-17 RX ORDER — ACARBOSE 100 MG/1
100 TABLET ORAL
Qty: 270 TABLET | Refills: 3 | Status: SHIPPED | OUTPATIENT
Start: 2025-01-17 | End: 2026-01-17

## 2025-01-17 NOTE — PROGRESS NOTES
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      Objective     Vitals:    01/17/25 0944   BP: 124/82   Pulse: 66   SpO2: 99%        Physical Exam  Vitals reviewed.   Constitutional:       Appearance: Normal appearance.   HENT:      Head: Normocephalic and atraumatic.   Eyes:      General: No scleral icterus.  Pulmonary:      Effort: Pulmonary effort is normal. No respiratory distress.   Neurological:      Mental Status: He is alert.      Gait: Gait normal.   Psychiatric:         Mood and Affect: Mood normal.         Behavior: Behavior normal.         Thought Content: Thought content normal.         Judgment: Judgment normal.     CGM interpretation  Dates reviewed: 1/4-1/17/25  Data:  99% time in range, 1% low  Interpretation:  Minimal hyoglycemia      Assessment & Plan   Hypoglycemia secondary to GI surgery  - Cont. acarbose 100 mg tid  - Discussed eating more frequent, higher protein, lower carb meals     - Return to clinic in 6 months

## 2025-03-16 DIAGNOSIS — I42.9 CARDIOMYOPATHY, UNSPECIFIED TYPE: ICD-10-CM

## 2025-03-17 RX ORDER — LOSARTAN POTASSIUM 25 MG/1
12.5 TABLET ORAL NIGHTLY
Qty: 45 TABLET | Refills: 3 | Status: SHIPPED | OUTPATIENT
Start: 2025-03-17

## 2025-03-21 ENCOUNTER — TELEPHONE (OUTPATIENT)
Dept: CARDIOLOGY | Facility: CLINIC | Age: 70
End: 2025-03-21
Payer: MEDICARE

## 2025-03-21 NOTE — TELEPHONE ENCOUNTER
JAYDE MCKEON called stating Patient has been having issues with hypotensive bp. Patient has been experiencing BP readings as low as 80's/40's. Patient currently taking losartan 12.5 mg at HS    They did not want to make any medication changes without consulting cardiology.     Patient Bp today was 106/70    Please advise

## 2025-03-24 NOTE — TELEPHONE ENCOUNTER
BARNEY patient and went over recommendations. Patient verbalized understanding and appreciation> patient am bp reading today 97/55-61.     Patient will keep BP record and notify office if bp starts being too elevated. Patient encouraged to stay well hydrated. Patient verbalized understanding

## 2025-04-10 ENCOUNTER — LAB (OUTPATIENT)
Dept: LAB | Facility: HOSPITAL | Age: 70
End: 2025-04-10
Payer: MEDICARE

## 2025-04-10 ENCOUNTER — OFFICE VISIT (OUTPATIENT)
Dept: FAMILY MEDICINE CLINIC | Age: 70
End: 2025-04-10
Payer: MEDICARE

## 2025-04-10 VITALS
DIASTOLIC BLOOD PRESSURE: 54 MMHG | OXYGEN SATURATION: 98 % | SYSTOLIC BLOOD PRESSURE: 115 MMHG | WEIGHT: 183 LBS | HEIGHT: 76 IN | BODY MASS INDEX: 22.29 KG/M2 | TEMPERATURE: 98.1 F | HEART RATE: 52 BPM

## 2025-04-10 DIAGNOSIS — Z13.6 SCREENING FOR CARDIOVASCULAR CONDITION: ICD-10-CM

## 2025-04-10 DIAGNOSIS — Z12.5 SCREENING FOR MALIGNANT NEOPLASM OF PROSTATE: ICD-10-CM

## 2025-04-10 DIAGNOSIS — Z00.00 MEDICARE ANNUAL WELLNESS VISIT, SUBSEQUENT: Primary | ICD-10-CM

## 2025-04-10 DIAGNOSIS — F32.9 REACTIVE DEPRESSION: ICD-10-CM

## 2025-04-10 DIAGNOSIS — Z23 ENCOUNTER FOR IMMUNIZATION: ICD-10-CM

## 2025-04-10 DIAGNOSIS — I95.9 HYPOTENSION, UNSPECIFIED HYPOTENSION TYPE: ICD-10-CM

## 2025-04-10 DIAGNOSIS — F41.1 GENERALIZED ANXIETY DISORDER: ICD-10-CM

## 2025-04-10 PROCEDURE — 36415 COLL VENOUS BLD VENIPUNCTURE: CPT

## 2025-04-10 PROCEDURE — 80053 COMPREHEN METABOLIC PANEL: CPT

## 2025-04-10 PROCEDURE — G0103 PSA SCREENING: HCPCS

## 2025-04-10 PROCEDURE — 80061 LIPID PANEL: CPT

## 2025-04-10 RX ORDER — ESCITALOPRAM OXALATE 20 MG/1
20 TABLET ORAL DAILY
Qty: 90 TABLET | Refills: 1 | Status: SHIPPED | OUTPATIENT
Start: 2025-04-10

## 2025-04-10 NOTE — PROGRESS NOTES
The ABCs of the Annual Wellness Visit  Subsequent Medicare Wellness Visit    Subjective    Timothy R Spurling is a 69 y.o. patient who presents for a Subsequent Medicare Wellness Visit.    The following portions of the patient's history were reviewed and updated as appropriate: allergies, current medications, past family history, past medical history, past social history, past surgical history, and problem list.    Compared to one year ago, the patient feels his physical health is the same.    Compared to one year ago, the patient feels his mental health is better.    Recent Hospitalizations:  He was admitted within the past 365 days at [facility].     Current Medical Providers:  Patient Care Team:  Eda Gu APRN as PCP - General (Nurse Practitioner)  Marilee Farfan APRN as Nurse Practitioner (Pain Medicine)  Jason Peck MD as Consulting Physician (Interventional Cardiology)    Opioid medication/s are on active medication list.  and I have evaluated his active treatment plan and pain score trends (see table).  Vitals:    04/10/25 1516   PainSc: 6      I have reviewed the chart for potential of high risk medication and harmful drug interactions in the elderly.          Aspirin is not on active medication list.  Aspirin use is not indicated based on review of current medical condition/s. Risk of harm outweighs potential benefits.  .      Patient Active Problem List   Diagnosis    Hyperlipemia    Hypertension    Chronic deep vein thrombosis (DVT) of lower extremity    Chronic systolic congestive heart failure    Atrial fibrillation    Cardiomyopathy    Coagulation disorder    Degeneration of lumbosacral intervertebral disc    Derangement of knee    History of histoplasmosis    Arthritis    Primary localized osteoarthritis    Seasonal allergic rhinitis    Spondylosis without myelopathy    Encounter for screening for malignant neoplasm of colon    Neuropathy    Urinary frequency    Shortness  "of breath    Constipation    Orthostatic hypotension    Prediabetes    Hypoglycemia    Hypoglycemia    Chronic pain    TIA (transient ischemic attack)    History of TIA (transient ischemic attack)    Generalized anxiety disorder    Reactive depression       Advance Care Planning  Advance Directive is on file.  ACP discussion was held with the patient during this visit. Patient has an advance directive in EMR which is still valid.     Objective    Vitals:    04/10/25 1516   BP: 115/54   BP Location: Left arm   Patient Position: Sitting   Cuff Size: Adult   Pulse: 52   Temp: 98.1 °F (36.7 °C)   TempSrc: Oral   SpO2: 98%   Weight: 83 kg (183 lb)   Height: 193 cm (75.98\")   PainSc: 6      Estimated body mass index is 22.29 kg/m² as calculated from the following:    Height as of this encounter: 193 cm (75.98\").    Weight as of this encounter: 83 kg (183 lb).    BMI is within normal parameters. No other follow-up for BMI required.      Does the patient have evidence of cognitive impairment? No          HEALTH RISK ASSESSMENT    Smoking Status:  Social History     Tobacco Use   Smoking Status Never    Passive exposure: Never   Smokeless Tobacco Never     Alcohol Consumption:  Social History     Substance and Sexual Activity   Alcohol Use Not Currently    Comment: Beer on rare occasion     Fall Risk Screen:    STEADI Fall Risk Assessment was completed, and patient is at LOW risk for falls.Assessment completed on:4/10/2025    Depression Screenin/10/2025     3:19 PM   PHQ-2/PHQ-9 Depression Screening   Little interest or pleasure in doing things Several days   Feeling down, depressed, or hopeless Several days   Trouble falling or staying asleep, or sleeping too much Several days   Feeling tired or having little energy Several days   Poor appetite or overeating Not at all   Feeling bad about yourself - or that you are a failure or have let yourself or your family down Not at all   Trouble concentrating on things, such " as reading the newspaper or watching television Several days   Moving or speaking so slowly that other people could have noticed? Or the opposite - being so fidgety or restless that you have been moving around a lot more than usual. Not at all   Thoughts that you would be better off dead or hurting yourself in some way Not at all   Patient Health Questionnaire-9 Score 5   How difficult have these problems made it for you to do your work, take care of things at home, or get along with other people? Somewhat difficult       Health Habits and Functional and Cognitive Screenin/10/2025     3:00 PM   Functional & Cognitive Status   Do you have difficulty preparing food and eating? No   Do you have difficulty bathing yourself, getting dressed or grooming yourself? No   Do you have difficulty using the toilet? No   Do you have difficulty moving around from place to place? No   Do you have trouble with steps or getting out of a bed or a chair? No   Current Diet Other   Dental Exam Not up to date   Eye Exam Up to date   Exercise (times per week) 7 times per week   Current Exercises Include House Cleaning   Do you need help using the phone?  No   Are you deaf or do you have serious difficulty hearing?  Yes   Do you need help to go to places out of walking distance? No   Do you need help shopping? No   Do you need help preparing meals?  No   Do you need help with housework?  No   Do you need help with laundry? No   Do you need help taking your medications? No   Do you need help managing money? No   Do you ever drive or ride in a car without wearing a seat belt? No   Have you felt unusual stress, anger or loneliness in the last month? Yes   Who do you live with? Alone   If you need help, do you have trouble finding someone available to you? No   Have you been bothered in the last four weeks by sexual problems? No   Do you have difficulty concentrating, remembering or making decisions? No       Age-appropriate Screening  Schedule:  Refer to the list below for future screening recommendations based on patient's age, sex and/or medical conditions. Orders for these recommended tests are listed in the plan section. The patient has been provided with a written plan.    Health Maintenance   Topic Date Due    DIABETIC EYE EXAM  Never done    URINE MICROALBUMIN-CREATININE RATIO (uACR)  Never done    HEMOGLOBIN A1C  12/24/2024    LIPID PANEL  05/23/2025    INFLUENZA VACCINE  07/01/2025    ANNUAL WELLNESS VISIT  04/10/2026    TDAP/TD VACCINES (2 - Td or Tdap) 02/22/2027    COLORECTAL CANCER SCREENING  05/26/2027    HEPATITIS C SCREENING  Completed    COVID-19 Vaccine  Completed    Pneumococcal Vaccine 50+  Completed    ZOSTER VACCINE  Completed                CMS Preventative Services Quick Reference  Risk Factors Identified During Encounter  Chronic Pain:  continue follow up with pain management   Immunizations Discussed/Encouraged:   The above risks/problems have been discussed with the patient.  Pertinent information has been shared with the patient in the After Visit Summary.  An After Visit Summary and PPPS were made available to the patient.    Follow Up:   Next Medicare Wellness visit to be scheduled in 1 year.     Additional E&M Note during same encounter follows:  Patient has multiple medical problems which are significant and separately identifiable that require additional work above and beyond the Medicare Wellness Visit.      Chief Complaint:    Chief Complaint   Patient presents with    Medicare Wellness-subsequent    and chronic medical condition follow up       Subjective    History of Present Illness:  In addition to the Medicare wellness exam,     Cyril presents today for follow up on Depression   He reports that He is feeling like he could feel better.  He is currently taking Lexapro 10 mg daily   He denies current suicidal and homicidal ideation.    Current psychotherapy : no    He has been having some problems with  "hypotension He contacted cardiology and they held his losartan He states this has been better lately.  He follows up with them in June.      Review of Systems:  Review of Systems     Objective   Vital Signs:  /54 (BP Location: Left arm, Patient Position: Sitting, Cuff Size: Adult)   Pulse 52   Temp 98.1 °F (36.7 °C) (Oral)   Ht 193 cm (75.98\")   Wt 83 kg (183 lb)   SpO2 98%   BMI 22.29 kg/m²     Physical Exam  Vitals reviewed.   Constitutional:       Appearance: Normal appearance.   HENT:      Head: Normocephalic.   Eyes:      Pupils: Pupils are equal, round, and reactive to light.   Cardiovascular:      Rate and Rhythm: Normal rate and regular rhythm.      Heart sounds: No murmur heard.  Pulmonary:      Effort: Pulmonary effort is normal.      Breath sounds: Normal breath sounds.   Musculoskeletal:         General: Normal range of motion.   Neurological:      Mental Status: He is alert.   Psychiatric:         Mood and Affect: Mood normal.         Behavior: Behavior normal.       The following data was reviewed by LINDA Bustos on 04/10/2025.    Outpatient Medications Prior to Visit   Medication Sig Dispense Refill    acarbose (PRECOSE) 100 MG tablet Take 1 tablet by mouth 3 (Three) Times a Day With Meals. 270 tablet 3    apixaban (ELIQUIS) 2.5 MG tablet tablet Take 1 tablet by mouth 2 (Two) Times a Day. LOT# [id number]  EXP  [id number]   8 BOXES 112 tablet 0    Continuous Glucose Monitor Sup kit Freestyle Evelio 3      Continuous Glucose  (FreeStyle Evelio 3 [place]) device       Continuous Glucose Sensor (FreeStyle Evelio 3 Sensor) misc Use 1 Device Every 14 (Fourteen) Days. 6 each 3    HYDROcodone-acetaminophen (NORCO) 7.5-325 MG per tablet Take 7.5 tablets by mouth 2 (Two) Times a Day As Needed.      multivitamin with minerals tablet tablet Take 1 tablet by mouth Daily.      escitalopram (Lexapro) 10 MG tablet Take 1 tablet by mouth Daily. 90 tablet 1    losartan (COZAAR) 25 MG tablet TAKE 0.5 " TABLETS BY MOUTH EVERY NIGHT (Patient not taking: Reported on 4/10/2025) 45 tablet 3    RSVPreF3 Vac Recomb Adjuvanted (Arexvy) 120 MCG/0.5ML reconstituted suspension injection Inject 0.5 mL into the appropriate muscle as directed by prescriber. (Patient not taking: Reported on 12/4/2024) 0.5 mL 0     No facility-administered medications prior to visit.          Assessment and Plan:       Diagnoses and all orders for this visit:    1. Medicare annual wellness visit, subsequent (Primary)    2. Reactive depression  Comments:  increased Lexapro to 20mg  Orders:  -     escitalopram (Lexapro) 20 MG tablet; Take 1 tablet by mouth Daily.  Dispense: 90 tablet; Refill: 1    3. Generalized anxiety disorder  Comments:  increase to 20 mg  Orders:  -     escitalopram (Lexapro) 20 MG tablet; Take 1 tablet by mouth Daily.  Dispense: 90 tablet; Refill: 1    4. Hypotension, unspecified hypotension type  Comments:  advise to increase salt intake to help maintain blood pressure    5. Encounter for immunization  -     COVID-19 (Pfizer) 12yrs+ (COMIRNATY)    6. Screening for cardiovascular condition  -     Lipid panel; Future  -     Comprehensive metabolic panel; Future    7. Screening for malignant neoplasm of prostate  -     PSA SCREENING; Future      Patient has been erroneously marked as diabetic. Based on the available clinical information, he does not have diabetes and should therefore be excluded from diabetic health maintenance and quality measures for the remainder of the reporting period.            Follow Up  Return in about 6 months (around 10/10/2025) for Recheck.  Patient was given instructions and counseling regarding his condition or for health maintenance advice. Please see specific information pulled into the AVS if appropriate.

## 2025-04-11 LAB
ALBUMIN SERPL-MCNC: 4.3 G/DL (ref 3.5–5.2)
ALBUMIN/GLOB SERPL: 1.9 G/DL
ALP SERPL-CCNC: 62 U/L (ref 39–117)
ALT SERPL W P-5'-P-CCNC: 16 U/L (ref 1–41)
ANION GAP SERPL CALCULATED.3IONS-SCNC: 11 MMOL/L (ref 5–15)
AST SERPL-CCNC: 19 U/L (ref 1–40)
BILIRUB SERPL-MCNC: 0.6 MG/DL (ref 0–1.2)
BUN SERPL-MCNC: 16 MG/DL (ref 8–23)
BUN/CREAT SERPL: 15.5 (ref 7–25)
CALCIUM SPEC-SCNC: 9.5 MG/DL (ref 8.6–10.5)
CHLORIDE SERPL-SCNC: 104 MMOL/L (ref 98–107)
CHOLEST SERPL-MCNC: 150 MG/DL (ref 0–200)
CO2 SERPL-SCNC: 27 MMOL/L (ref 22–29)
CREAT SERPL-MCNC: 1.03 MG/DL (ref 0.76–1.27)
EGFRCR SERPLBLD CKD-EPI 2021: 79.1 ML/MIN/1.73
GLOBULIN UR ELPH-MCNC: 2.3 GM/DL
GLUCOSE SERPL-MCNC: 88 MG/DL (ref 65–99)
HDLC SERPL-MCNC: 62 MG/DL (ref 40–60)
LDLC SERPL CALC-MCNC: 75 MG/DL (ref 0–100)
LDLC/HDLC SERPL: 1.22 {RATIO}
POTASSIUM SERPL-SCNC: 4.5 MMOL/L (ref 3.5–5.2)
PROT SERPL-MCNC: 6.6 G/DL (ref 6–8.5)
PSA SERPL-MCNC: 0.99 NG/ML (ref 0–4)
SODIUM SERPL-SCNC: 142 MMOL/L (ref 136–145)
TRIGL SERPL-MCNC: 62 MG/DL (ref 0–150)
VLDLC SERPL-MCNC: 13 MG/DL (ref 5–40)

## 2025-04-15 ENCOUNTER — TELEPHONE (OUTPATIENT)
Dept: CARDIOLOGY | Facility: CLINIC | Age: 70
End: 2025-04-15
Payer: MEDICARE

## 2025-04-15 ENCOUNTER — TELEPHONE (OUTPATIENT)
Dept: FAMILY MEDICINE CLINIC | Age: 70
End: 2025-04-15
Payer: MEDICARE

## 2025-04-15 DIAGNOSIS — F41.1 GENERALIZED ANXIETY DISORDER: ICD-10-CM

## 2025-04-15 DIAGNOSIS — R00.1 BRADYCARDIA: Primary | ICD-10-CM

## 2025-04-15 DIAGNOSIS — F32.9 REACTIVE DEPRESSION: ICD-10-CM

## 2025-04-15 RX ORDER — ESCITALOPRAM OXALATE 10 MG/1
10 TABLET ORAL DAILY
Start: 2025-04-15

## 2025-04-15 NOTE — TELEPHONE ENCOUNTER
Pt called and said that he started the lexapro at 20 mg yesterday and last night his low BP (113/56) and heart rate (39)  he said he felt terrible . This morning he only took 10 mg of lexapro and his b/p was  114/66 and pulse 53 which is normal for him and he feels mostly back to normal

## 2025-04-15 NOTE — TELEPHONE ENCOUNTER
Patient called stating he was having low heart rate last night, running 38-45 for over 4 hours, experienced some SOA during that time. He said he could feel an unusual beat during that time. 102/62-    Patient even went outside to walk around to help get his heart rate back up. It was only staying at 50. Patient has Dexacen that notified him his BG was low as well- it fell below 70- he took two glucose pills, after a little while he felt better, BP went up to 113/56-55- glucose stabilized.    Patient was experiencing more depression after his wife passed, PCP increased his  lexapro  from 10 mg to 20 mg wasn't sure if this was causing it. He called his PCP and went back down on the dose last night to 10 mg.     Please advise

## 2025-04-30 ENCOUNTER — RESULTS FOLLOW-UP (OUTPATIENT)
Dept: CARDIOLOGY | Facility: CLINIC | Age: 70
End: 2025-04-30
Payer: MEDICARE

## 2025-04-30 NOTE — TELEPHONE ENCOUNTER
Per LINDA Kee: Lowest heart rate was 40 bpm, average heart was 53 bpm, maximum heart rate was 102 bpm.    can you check if he has any more symptoms?

## 2025-05-25 DIAGNOSIS — F41.1 GENERALIZED ANXIETY DISORDER: ICD-10-CM

## 2025-05-25 DIAGNOSIS — F32.9 REACTIVE DEPRESSION: ICD-10-CM

## 2025-05-27 ENCOUNTER — TELEPHONE (OUTPATIENT)
Dept: FAMILY MEDICINE CLINIC | Age: 70
End: 2025-05-27
Payer: MEDICARE

## 2025-05-27 RX ORDER — ESCITALOPRAM OXALATE 10 MG/1
10 TABLET ORAL DAILY
Qty: 90 TABLET | OUTPATIENT
Start: 2025-05-27

## 2025-05-27 NOTE — TELEPHONE ENCOUNTER
"  Caller: Spurling, Timothy R \"Cyril\"    Relationship: Self    Best call back number: [phone]     What is the medical concern/diagnosis: MIDDLE TOE PAIN ON RIGHT FOOT WITH HISTORY OF HAMMER TOE     What specialty or service is being requested: PODIATRY     What is the provider, practice or medical service name: [facility] PROVIDER     What is the office location: [place]             "

## 2025-05-30 DIAGNOSIS — M79.673 PAIN OF FOOT, UNSPECIFIED LATERALITY: Primary | ICD-10-CM

## 2025-06-20 ENCOUNTER — OFFICE VISIT (OUTPATIENT)
Dept: CARDIOLOGY | Facility: CLINIC | Age: 70
End: 2025-06-20
Payer: MEDICARE

## 2025-06-20 VITALS
BODY MASS INDEX: 22.63 KG/M2 | HEART RATE: 47 BPM | SYSTOLIC BLOOD PRESSURE: 116 MMHG | WEIGHT: 185.8 LBS | HEIGHT: 76 IN | DIASTOLIC BLOOD PRESSURE: 76 MMHG

## 2025-06-20 DIAGNOSIS — I82.509 CHRONIC DEEP VEIN THROMBOSIS (DVT) OF LOWER EXTREMITY, UNSPECIFIED LATERALITY, UNSPECIFIED VEIN: ICD-10-CM

## 2025-06-20 DIAGNOSIS — I50.22 CHRONIC SYSTOLIC CONGESTIVE HEART FAILURE: Primary | ICD-10-CM

## 2025-06-20 DIAGNOSIS — I10 PRIMARY HYPERTENSION: ICD-10-CM

## 2025-06-20 DIAGNOSIS — E78.2 MIXED HYPERLIPIDEMIA: ICD-10-CM

## 2025-06-20 DIAGNOSIS — R00.1 BRADYCARDIA: ICD-10-CM

## 2025-06-20 RX ORDER — ESCITALOPRAM OXALATE 20 MG/1
20 TABLET ORAL DAILY
COMMUNITY

## 2025-06-20 RX ORDER — ACARBOSE 100 MG/1
100 TABLET ORAL
COMMUNITY

## 2025-06-20 NOTE — PROGRESS NOTES
"Chief Complaint  Follow-up (6 month/), Congestive Heart Failure, Atrial Fibrillation, Hypertension, and Hyperlipidemia    Subjective        History of Present Illness  Timothy R Spurling presents to [facility] for follow up.   Patient is a 69-year-old male with past medical history outlined below, significant for nonischemic cardiomyopathy, hypertension, chronic DVT And hyperlipidemia who presents for routine follow-up.  He is doing very well from a cardiac standpoint.  He really has no complaints or concerns today.  He denies any chest pain or discomfort.  He notes no dyspnea, edema, palpitations.     Past Medical History:   Diagnosis Date    Arrhythmia     Arthritis     \"all over\"    Cancer     pre-cancerous areas on scalp. used chemo cream    Cardiomyopathy     CHF (congestive heart failure)     Cholelithiasis     Chronic deep vein thrombosis (DVT) of lower extremity 01/05/2022    bilateral    Coronary artery disease     Diabetes mellitus     Gastric ulcer     History of transfusion     HL (hearing loss)     Hyperlipemia 01/05/2022    Hypertension 01/05/2022    Hypoglycemia     Low back pain     Long time ago    Neuropathy in diabetes     Pancreatitis     PVC (premature ventricular contraction)     Stroke     TIA (transient ischemic attack) 07/11/2024    Vision loss     Vitamin D deficiency        ALLERGY  No Known Allergies     Past Surgical History:   Procedure Laterality Date    CARDIAC CATHETERIZATION      CHOLECYSTECTOMY      COLONOSCOPY      COLONOSCOPY N/A 05/26/2022    Procedure: COLONOSCOPY WITH BIOPSY;  Surgeon: Teddy Gupta MD;  Location: [facility] ENDOSCOPY;  Service: Gastroenterology;  Laterality: N/A;  RECTAL EROSIONS    ERCP      FRACTURE SURGERY      JOINT REPLACEMENT Right     right knee replacement    PANCREAS SURGERY  [id number]    stents placed and removed    STOMACH SURGERY          Social History     Socioeconomic History    Marital status:    Tobacco " "Use    Smoking status: Never     Passive exposure: Never    Smokeless tobacco: Never   Vaping Use    Vaping status: Never Used   Substance and Sexual Activity    Alcohol use: Not Currently     Comment: Beer on rare occasion    Drug use: Never    Sexual activity: Not Currently     Partners: Female       Family History   Problem Relation Age of Onset    Emphysema Mother     Arthritis Mother     Asthma Mother     Clotting disorder Father     Lung cancer Father     Stroke Father     Lung cancer Brother     Colon cancer Neg Hx         Current Outpatient Medications on File Prior to Visit   Medication Sig    acarbose (PRECOSE) 100 MG tablet Take 1 tablet by mouth 3 (Three) Times a Day With Meals.    Continuous Glucose Monitor Sup kit Freestyle Evelio 3    Continuous Glucose  (FreeStyle Evelio 3 [place]) device     Continuous Glucose Sensor (FreeStyle Evelio 3 Sensor) misc Use 1 Device Every 14 (Fourteen) Days.    escitalopram (Lexapro) 10 MG tablet Take 1 tablet by mouth Daily.    HYDROcodone-acetaminophen (NORCO) 7.5-325 MG per tablet Take 7.5 tablets by mouth 2 (Two) Times a Day As Needed.    multivitamin with minerals tablet tablet Take 1 tablet by mouth Daily.    [DISCONTINUED] apixaban (ELIQUIS) 2.5 MG tablet tablet Take 1 tablet by mouth 2 (Two) Times a Day. LOT# [id number]  EXP  [id number]   8 BOXES    escitalopram (LEXAPRO) 20 MG tablet Take 1 tablet by mouth Daily. (Patient not taking: Reported on 6/20/2025)    [DISCONTINUED] acarbose (PRECOSE) 100 MG tablet Take 1 tablet by mouth 3 (Three) Times a Day With Meals.     No current facility-administered medications on file prior to visit.       Objective   Vitals:    06/20/25 1014   BP: 116/76   BP Location: Left arm   Patient Position: Sitting   Cuff Size: Adult   Pulse: (!) 47   Weight: 84.3 kg (185 lb 12.8 oz)   Height: 193 cm (75.98\")       Physical Exam  Constitutional:       General: He is awake. He is not in acute distress.     Appearance: Normal appearance. "   HENT:      Head: Normocephalic.      Nose: Nose normal. No congestion.   Eyes:      Extraocular Movements: Extraocular movements intact.      Conjunctiva/sclera: Conjunctivae normal.      Pupils: Pupils are equal, round, and reactive to light.   Neck:      Thyroid: No thyromegaly.      Vascular: No JVD.   Cardiovascular:      Rate and Rhythm: Normal rate and regular rhythm.      Chest Wall: PMI is not displaced.      Pulses: Normal pulses.      Heart sounds: Normal heart sounds, S1 normal and S2 normal. No murmur heard.     No friction rub. No gallop. No S3 or S4 sounds.   Pulmonary:      Effort: Pulmonary effort is normal.      Breath sounds: Normal breath sounds. No wheezing, rhonchi or rales.   Abdominal:      General: Bowel sounds are normal.      Palpations: Abdomen is soft.      Tenderness: There is no abdominal tenderness.   Musculoskeletal:      Cervical back: No tenderness.      Right lower leg: No edema.      Left lower leg: No edema.   Lymphadenopathy:      Cervical: No cervical adenopathy.   Skin:     General: Skin is warm and dry.      Capillary Refill: Capillary refill takes less than 2 seconds.      Coloration: Skin is not cyanotic.      Findings: No petechiae or rash.      Nails: There is no clubbing.   Neurological:      Mental Status: He is alert.   Psychiatric:         Mood and Affect: Mood normal.         Behavior: Behavior is cooperative.           Result Review     The following data was reviewed by LINDA Santos on 06/20/25.      CMP          4/10/2025    16:16   CMP   Glucose 88    BUN 16    Creatinine 1.03    EGFR 79.1    Sodium 142    Potassium 4.5    Chloride 104    Calcium 9.5    Total Protein 6.6    Albumin 4.3    Globulin 2.3    Total Bilirubin 0.6    Alkaline Phosphatase 62    AST (SGOT) 19    ALT (SGPT) 16    Albumin/Globulin Ratio 1.9    BUN/Creatinine Ratio 15.5    Anion Gap 11.0         Lipid Panel          4/10/2025    16:16   Lipid Panel   Total Cholesterol 150     Triglycerides 62    HDL Cholesterol 62    VLDL Cholesterol 13    LDL Cholesterol  75    LDL/HDL Ratio 1.22        Results for orders placed during the hospital encounter of 05/23/24    Adult Transthoracic Echo Complete W/ Cont if Necessary Per Protocol    Interpretation Summary    Left ventricular ejection fraction appears to be 41 - 45% ( with ECHO CONTRAST).    Left ventricular diastolic function is consistent with (grade I) impaired relaxation.    The left atrial cavity is mildly dilated.      === Results for orders placed in visit on 04/23/25 ===    HOLTER MONITOR - 48 HOUR    - Interpretation Summary -    Patient was monitored for 1 day, 22 hours and 28 minutes.    Lowest heart rate was 40 bpm, average heart was 53 bpm, maximum heart rate was 102 bpm.    Rare PVC.  Rare PAC.    No patient activated events.          Procedures      Assessment & Plan  Chronic systolic congestive heart failure  Most recent echocardiogram was EF 40 to 45%.  Likely nonischemic as patient reports previous heart catheterization was normal.  Unable to tolerate GDMT due to orthostatic hypotension.  Patient appears well compensated on exam.  He has no edema and is asymptomatic.  Continue to monitor clinically.  Primary hypertension  Blood pressure is under good control.  Mixed hyperlipidemia  LDL is at goal.  Bradycardia  Patient's heart rate is on the low side at baseline.  He is asymptomatic.  Chronic deep vein thrombosis (DVT) of lower extremity, unspecified laterality, unspecified vein  Continue Xarelto.                     Follow Up   Return in about 1 year (around 6/20/2026) for With Dr. Duarte.    Patient was given instructions and counseling regarding his condition or for health maintenance advice. Please see specific information pulled into the AVS if appropriate.     Vidhya Reynolds, LINDA  06/20/25  10:17 EDT    Dictated Utilizing Dragon Dictation

## 2025-06-20 NOTE — ASSESSMENT & PLAN NOTE
Most recent echocardiogram was EF 40 to 45%.  Likely nonischemic as patient reports previous heart catheterization was normal.  Unable to tolerate GDMT due to orthostatic hypotension.  Patient appears well compensated on exam.  He has no edema and is asymptomatic.  Continue to monitor clinically.

## 2025-07-18 ENCOUNTER — OFFICE VISIT (OUTPATIENT)
Dept: ENDOCRINOLOGY | Age: 70
End: 2025-07-18
Payer: MEDICARE

## 2025-07-18 VITALS
BODY MASS INDEX: 22.63 KG/M2 | OXYGEN SATURATION: 98 % | DIASTOLIC BLOOD PRESSURE: 78 MMHG | SYSTOLIC BLOOD PRESSURE: 124 MMHG | HEIGHT: 76 IN | HEART RATE: 52 BPM | WEIGHT: 185.8 LBS

## 2025-07-18 DIAGNOSIS — E16.2 HYPOGLYCEMIA: Primary | ICD-10-CM

## 2025-07-18 PROCEDURE — 1159F MED LIST DOCD IN RCRD: CPT | Performed by: INTERNAL MEDICINE

## 2025-07-18 PROCEDURE — 3074F SYST BP LT 130 MM HG: CPT | Performed by: INTERNAL MEDICINE

## 2025-07-18 PROCEDURE — 95251 CONT GLUC MNTR ANALYSIS I&R: CPT | Performed by: INTERNAL MEDICINE

## 2025-07-18 PROCEDURE — 3078F DIAST BP <80 MM HG: CPT | Performed by: INTERNAL MEDICINE

## 2025-07-18 PROCEDURE — 99213 OFFICE O/P EST LOW 20 MIN: CPT | Performed by: INTERNAL MEDICINE

## 2025-07-18 PROCEDURE — 1160F RVW MEDS BY RX/DR IN RCRD: CPT | Performed by: INTERNAL MEDICINE

## 2025-07-18 RX ORDER — ACARBOSE 100 MG/1
100 TABLET ORAL
Qty: 270 TABLET | Refills: 1 | Status: SHIPPED | OUTPATIENT
Start: 2025-07-18

## 2025-07-18 NOTE — PROGRESS NOTES
Referring provider: No ref. 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      Objective     Vitals:    07/18/25 1047   BP: 124/78   Pulse: 52   SpO2: 98%        Physical Exam  Vitals reviewed.   Constitutional:       Appearance: Normal appearance.   HENT:      Head: Normocephalic and atraumatic.   Eyes:      General: No scleral icterus.  Pulmonary:      Effort: Pulmonary effort is normal. No respiratory distress.   Neurological:      Mental Status: He is alert.      Gait: Gait normal.   Psychiatric:         Mood and Affect: Mood normal.         Behavior: Behavior normal.         Thought Content: Thought content normal.         Judgment: Judgment normal.     CGM interpretation  Dates reviewed:  7/5-7/18/25  Data:  Avg of 95, 98% in target, 2% low  Interpretation:  A few low BG levels after supper      Assessment & Plan   Hypoglycemia secondary to GI surgery  - Cont. acarbose 100 mg tid  - Discussed eating more frequent, higher protein, lower carb meals     - Return to clinic in 6 months

## 2025-07-21 ENCOUNTER — OFFICE VISIT (OUTPATIENT)
Dept: PODIATRY | Facility: CLINIC | Age: 70
End: 2025-07-21
Payer: MEDICARE

## 2025-07-21 VITALS
BODY MASS INDEX: 22.77 KG/M2 | HEIGHT: 76 IN | WEIGHT: 187 LBS | OXYGEN SATURATION: 98 % | HEART RATE: 52 BPM | TEMPERATURE: 98.6 F | DIASTOLIC BLOOD PRESSURE: 54 MMHG | SYSTOLIC BLOOD PRESSURE: 92 MMHG

## 2025-07-21 DIAGNOSIS — L60.0 INGROWN TOENAIL: ICD-10-CM

## 2025-07-21 DIAGNOSIS — M20.42 HAMMER TOES OF BOTH FEET: Primary | ICD-10-CM

## 2025-07-21 DIAGNOSIS — M20.41 HAMMER TOES OF BOTH FEET: Primary | ICD-10-CM

## 2025-07-21 PROCEDURE — 1159F MED LIST DOCD IN RCRD: CPT | Performed by: PODIATRIST

## 2025-07-21 PROCEDURE — 1160F RVW MEDS BY RX/DR IN RCRD: CPT | Performed by: PODIATRIST

## 2025-07-21 PROCEDURE — 11055 PARING/CUTG B9 HYPRKER LES 1: CPT | Performed by: PODIATRIST

## 2025-07-21 PROCEDURE — 99203 OFFICE O/P NEW LOW 30 MIN: CPT | Performed by: PODIATRIST

## 2025-07-21 PROCEDURE — 3074F SYST BP LT 130 MM HG: CPT | Performed by: PODIATRIST

## 2025-07-21 PROCEDURE — 3078F DIAST BP <80 MM HG: CPT | Performed by: PODIATRIST

## 2025-07-21 NOTE — PROGRESS NOTES
"    [facility] - PODIATRY    Today's Date: 07/21/25    Patient Name: Timothy R Spurling  MRN: [id number]  CSN: [id number]  PCP: Eda Gu APRN,  Referring Provider: Eda Gu APRN    SUBJECTIVE     Chief Complaint   Patient presents with    Right Foot - Pain, Establish Care, Hammer Toe, Nail Problem     Painful growth tip of third toe painful hammertoes    Left Foot - Nail Problem, Establish Care      Has had great toenail removed due to trauma       HPI: Timothy R Spurling, a 69 y.o.male, presents to clinic.    History of Present Illness  The patient presents for evaluation of a growth on his toe.    He reports experiencing pain in his toe, which he attributes to an abnormal growth. The discomfort intensifies upon touch. He also mentions severe pain in the knuckle of the same toe, which has led to a persistent limp. His vision is compromised, and he is currently on anticoagulant therapy, which makes him apprehensive about trimming his toenails. He has not yet tried using hammertoe crutches. He recalls that his previous occupation required him to wear steel-toed shoes, which he believes caused significant trauma to his toes, resulting in the loss of his big toenails. He often found his boots soaked in blood after work. He does not remember the last time he trimmed his toenails but admits it was not recent and that he did not do a thorough job due to his impaired vision. He notes that some of his toenails are almost non-existent and do not seem to grow into the skin.    Supplemental Information  He is not diabetic but experiences hypoglycemia.         Past Medical History:   Diagnosis Date    Arrhythmia     Arthritis     \"all over\"    Cancer     pre-cancerous areas on scalp. used chemo cream    Cardiomyopathy     CHF (congestive heart failure)     Cholelithiasis     Chronic deep vein thrombosis (DVT) of lower extremity 01/05/2022    bilateral    Coronary artery disease     Diabetes mellitus  "    Difficulty walking     Quite some time.    Gastric ulcer     History of transfusion     HL (hearing loss)     Hyperlipemia 01/05/2022    Hypertension 01/05/2022    Hypoglycemia     Low back pain     Long time ago    Neuropathy in diabetes     Pancreatitis     PVC (premature ventricular contraction)     Stroke     TIA (transient ischemic attack) 07/11/2024    Vision loss     Vitamin D deficiency      Past Surgical History:   Procedure Laterality Date    CARDIAC CATHETERIZATION      CHOLECYSTECTOMY      COLONOSCOPY      COLONOSCOPY N/A 05/26/2022    Procedure: COLONOSCOPY WITH BIOPSY;  Surgeon: Teddy Gupta MD;  Location: [facility] ENDOSCOPY;  Service: Gastroenterology;  Laterality: N/A;  RECTAL EROSIONS    ERCP      FRACTURE SURGERY      JOINT REPLACEMENT Right     right knee replacement    PANCREAS SURGERY  [id number]    stents placed and removed    STOMACH SURGERY      TOENAIL EXCISION      Several years ago     Family History   Problem Relation Age of Onset    Emphysema Mother     Arthritis Mother     Asthma Mother     Clotting disorder Father     Lung cancer Father     Stroke Father     Lung cancer Brother     Cancer Brother     Colon cancer Neg Hx      Social History     Socioeconomic History    Marital status:    Tobacco Use    Smoking status: Never     Passive exposure: Never    Smokeless tobacco: Never   Vaping Use    Vaping status: Never Used   Substance and Sexual Activity    Alcohol use: Not Currently     Comment: Beer on rare occasion    Drug use: Never    Sexual activity: Not Currently     Partners: Female     No Known Allergies  Current Outpatient Medications   Medication Sig Dispense Refill    acarbose (PRECOSE) 100 MG tablet Take 1 tablet by mouth 3 (Three) Times a Day With Meals. 270 tablet 1    apixaban (Eliquis) 2.5 MG tablet tablet Take 1 tablet by mouth Every 12 (Twelve) Hours. 56 tablet 0    Continuous Glucose Monitor Sup kit Freestyle Eveilo 3      Continuous Glucose   (FreeStyle Evelio 3 [place]) device       Continuous Glucose Sensor (FreeStyle Evelio 3 Sensor) misc Use 1 Device Every 14 (Fourteen) Days. 6 each 3    escitalopram (Lexapro) 10 MG tablet Take 1 tablet by mouth Daily.      escitalopram (LEXAPRO) 20 MG tablet Take 1 tablet by mouth Daily. (Patient taking differently: Take 0.5 tablets by mouth Daily.)      HYDROcodone-acetaminophen (NORCO) 7.5-325 MG per tablet Take 7.5 tablets by mouth 2 (Two) Times a Day As Needed.      multivitamin with minerals tablet tablet Take 1 tablet by mouth Daily.       No current facility-administered medications for this visit.         OBJECTIVE     Vitals:    07/21/25 0849   BP: 92/54   Pulse: 52   Temp: 98.6 °F (37 °C)   SpO2: 98%       WBC   Date Value Ref Range Status   05/23/2024 5.40 3.40 - 10.80 10*3/mm3 Final   03/12/2020 5.11 4.80 - 10.80 10*3/uL Final     RBC   Date Value Ref Range Status   05/23/2024 4.05 (L) 4.14 - 5.80 10*6/mm3 Final     Hemoglobin   Date Value Ref Range Status   05/23/2024 13.0 13.0 - 17.7 g/dL Final     Hematocrit   Date Value Ref Range Status   05/23/2024 38.4 37.5 - 51.0 % Final     MCV   Date Value Ref Range Status   05/23/2024 94.8 79.0 - 97.0 fL Final     MCH   Date Value Ref Range Status   05/23/2024 32.1 26.6 - 33.0 pg Final     MCHC   Date Value Ref Range Status   05/23/2024 33.9 31.5 - 35.7 g/dL Final     RDW   Date Value Ref Range Status   05/23/2024 12.4 12.3 - 15.4 % Final     RDW-SD   Date Value Ref Range Status   05/23/2024 42.9 37.0 - 54.0 fl Final     MPV   Date Value Ref Range Status   05/23/2024 9.5 6.0 - 12.0 fL Final     Platelets   Date Value Ref Range Status   05/23/2024 142 140 - 450 10*3/mm3 Final     Neutrophil %   Date Value Ref Range Status   05/23/2024 78.5 (H) 42.7 - 76.0 % Final     Lymphocyte %   Date Value Ref Range Status   05/23/2024 13.5 (L) 19.6 - 45.3 % Final     Monocyte %   Date Value Ref Range Status   05/23/2024 6.9 5.0 - 12.0 % Final     Eosinophil %   Date Value Ref  Range Status   05/23/2024 0.7 0.3 - 6.2 % Final     Basophil %   Date Value Ref Range Status   05/23/2024 0.2 0.0 - 1.5 % Final     Immature Grans %   Date Value Ref Range Status   05/23/2024 0.2 0.0 - 0.5 % Final     Neutrophils, Absolute   Date Value Ref Range Status   05/23/2024 4.24 1.70 - 7.00 10*3/mm3 Final     Lymphocytes, Absolute   Date Value Ref Range Status   05/23/2024 0.73 0.70 - 3.10 10*3/mm3 Final     Monocytes, Absolute   Date Value Ref Range Status   05/23/2024 0.37 0.10 - 0.90 10*3/mm3 Final     Eosinophils, Absolute   Date Value Ref Range Status   05/23/2024 0.04 0.00 - 0.40 10*3/mm3 Final     Basophils, Absolute   Date Value Ref Range Status   05/23/2024 0.01 0.00 - 0.20 10*3/mm3 Final     Immature Grans, Absolute   Date Value Ref Range Status   05/23/2024 0.01 0.00 - 0.05 10*3/mm3 Final     nRBC   Date Value Ref Range Status   05/23/2024 0.0 0.0 - 0.2 /100 WBC Final         Lab Results   Component Value Date    GLUCOSE 88 04/10/2025    BUN 16 04/10/2025    CREATININE 1.03 04/10/2025    EGFRIFNONA 66 02/01/2022    BCR 15.5 04/10/2025    K 4.5 04/10/2025    CO2 27.0 04/10/2025    CALCIUM 9.5 04/10/2025    ALBUMIN 4.3 04/10/2025    AST 19 04/10/2025    ALT 16 04/10/2025       Patient seen in no apparent distress.      PHYSICAL EXAM:     Foot/Ankle Exam    GENERAL  Appearance:  appears stated age  Orientation:  AAOx3  Affect:  appropriate  Gait:  unimpaired  Assistance:  independent  Right shoe gear: casual shoe  Left shoe gear: casual shoe    VASCULAR     Right Foot Vascularity   Normal vascular exam    Dorsalis pedis:  2+  Posterior tibial:  2+  Skin temperature:  warm  Edema grading:  None  CFT:  < 3 seconds  Pedal hair growth:  Present  Varicosities:  none     Left Foot Vascularity   Normal vascular exam    Dorsalis pedis:  2+  Posterior tibial:  2+  Skin temperature:  warm  Edema grading:  None  CFT:  < 3 seconds  Pedal hair growth:  Present  Varicosities:  none     NEUROLOGIC     Right Foot  Neurologic   Normal sensation    Light touch sensation: normal  Vibratory sensation: normal  Hot/Cold sensation: normal  Protective Sensation using [place] Monofilament:   Sites intact: 10  Sites tested: 10     Left Foot Neurologic   Normal sensation    Light touch sensation: normal  Vibratory sensation: normal  Hot/Cold sensation:  normal  Protective Sensation using Schlater-West Monofilament:   Sites intact: 10  Sites tested: 10    MUSCULOSKELETAL     Right Foot Musculoskeletal   Hammertoe:  Second toe, third toe, fourth toe and fifth toe     Left Foot Musculoskeletal   Hammertoe:  Second toe, third toe, fifth toe and fourth toe    MUSCLE STRENGTH     Right Foot Muscle Strength   Foot dorsiflexion:  4  Foot plantar flexion:  4  Foot inversion:  4  Foot eversion:  4     Left Foot Muscle Strength   Foot dorsiflexion:  4  Foot plantar flexion:  4  Foot inversion:  4  Foot eversion:  4    RANGE OF MOTION     Right Foot Range of Motion   Foot and ankle ROM within normal limits       Left Foot Range of Motion   Foot and ankle ROM within normal limits      DERMATOLOGIC      Right Foot Dermatologic   Skin  Right foot skin is intact.      Left Foot Dermatologic   Skin  Left foot skin is intact.       RADIOLOGY:          No results found.    ASSESSMENT/PLAN     Diagnoses and all orders for this visit:    1. Hammer toes of both feet (Primary)    2. Ingrown toenail        Comprehensive lower extremity examination and evaluation was performed.    Assessment & Plan    The patient's pain is attributed to arthritis in the joint, exacerbated by a hammertoe condition. This results in the formation of a callus on the tip of the toe due to pressure. Hammertoe crutches were recommended to alleviate the discomfort. These crutches can be ordered from [facility]. The callus was shaved off during this visit, which may cause some soreness. He was advised to keep the area wrapped for the day.    Follow-up  A follow-up visit is  scheduled in 3 months.    PROCEDURE  Procedure: Callus shaving on toe  - Procedural Discussion: Discussed shaving off the callus on the toe to alleviate pain caused by arthritis and hammertoe. The patient agreed to proceed.  - Technique: Shaved off the callus on the toe.  - Post-Procedural Discussion: Procedure completed. Patient reported pain during the procedure but tolerated it.           Discussed findings and treatment plan including risks, benefits, and treatment options with patient in detail. Patient agreed with treatment plan.    Medications and allergies reviewed.  Reviewed available lab values along with other pertinent labs.  These were discussed with the patient.    All questions were answered to patient/family satisfaction. Should symptoms fail to improve or worsen they agree to call or return to clinic or to go to the Emergency Department. Discussed the importance of following up with any needed screening tests/labs/specialist appointments and any requested follow-up recommended by me today. Importance of maintaining follow-up discussed and patient accepts that missed appointments can delay diagnosis and potentially lead to worsening of conditions.    Return in about 1 month (around 8/21/2025)., or sooner if acute issues arise.    Patient or patient representative verbalized consent for the use of Ambient Listening during the visit with  Zaheer Wills DPM for chart documentation. 7/21/2025  14:19 EDT    This document has been electronically signed by Zaheer Wills DPM on July 21, 2025 14:19 EDT

## (undated) DEVICE — COLON KIT: Brand: MEDLINE INDUSTRIES, INC.

## (undated) DEVICE — SINGLE-USE BIOPSY FORCEPS: Brand: RADIAL JAW 4

## (undated) DEVICE — SOL IRRG H2O PL/BG 1000ML STRL

## (undated) DEVICE — Device: Brand: DEFENDO AIR/WATER/SUCTION AND BIOPSY VALVE